# Patient Record
Sex: FEMALE | Race: WHITE | NOT HISPANIC OR LATINO | Employment: UNEMPLOYED | URBAN - METROPOLITAN AREA
[De-identification: names, ages, dates, MRNs, and addresses within clinical notes are randomized per-mention and may not be internally consistent; named-entity substitution may affect disease eponyms.]

---

## 2017-03-09 ENCOUNTER — APPOINTMENT (OUTPATIENT)
Dept: GENERAL RADIOLOGY | Facility: HOSPITAL | Age: 62
End: 2017-03-09

## 2017-03-09 ENCOUNTER — HOSPITAL ENCOUNTER (INPATIENT)
Facility: HOSPITAL | Age: 62
LOS: 12 days | Discharge: NURSING, REHAB OR LONG-TERM CARE | End: 2017-03-21
Attending: EMERGENCY MEDICINE | Admitting: INTERNAL MEDICINE

## 2017-03-09 ENCOUNTER — APPOINTMENT (OUTPATIENT)
Dept: CT IMAGING | Facility: HOSPITAL | Age: 62
End: 2017-03-09

## 2017-03-09 DIAGNOSIS — M72.6 NECROTIZING FASCIITIS (HCC): ICD-10-CM
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
## 2017-03-09 DIAGNOSIS — E87.20 METABOLIC ACIDOSIS: ICD-10-CM

## 2017-03-09 DIAGNOSIS — A41.9 SEPSIS, DUE TO UNSPECIFIED ORGANISM: ICD-10-CM

## 2017-03-09 DIAGNOSIS — T79.6XXA TRAUMATIC RHABDOMYOLYSIS, INITIAL ENCOUNTER (HCC): ICD-10-CM

## 2017-03-09 DIAGNOSIS — N17.9 ACUTE RENAL FAILURE, UNSPECIFIED ACUTE RENAL FAILURE TYPE (HCC): Primary | ICD-10-CM

## 2017-03-09 DIAGNOSIS — T68.XXXA HYPOTHERMIA, INITIAL ENCOUNTER: ICD-10-CM

## 2017-03-09 LAB
ALBUMIN SERPL-MCNC: 3 G/DL (ref 3.5–5.2)
ALBUMIN SERPL-MCNC: 3.5 G/DL (ref 3.5–5.2)
ALBUMIN/GLOB SERPL: 0.8 G/DL
ALP SERPL-CCNC: 202 U/L (ref 39–117)
ALT SERPL W P-5'-P-CCNC: 145 U/L (ref 1–33)
ANION GAP SERPL CALCULATED.3IONS-SCNC: 22.6 MMOL/L
ANION GAP SERPL CALCULATED.3IONS-SCNC: 24.1 MMOL/L
APTT PPP: 26.5 SECONDS (ref 22.7–35.4)
ARTERIAL PATENCY WRIST A: ABNORMAL
AST SERPL-CCNC: 429 U/L (ref 1–32)
ATMOSPHERIC PRESS: 750.9 MMHG
BACTERIA UR QL AUTO: ABNORMAL /HPF
BASE EXCESS BLDA CALC-SCNC: -8.4 MMOL/L (ref 0–2)
BASOPHILS # BLD AUTO: 0.01 10*3/MM3 (ref 0–0.2)
BASOPHILS NFR BLD AUTO: 0.1 % (ref 0–1.5)
BDY SITE: ABNORMAL
BILIRUB SERPL-MCNC: 0.4 MG/DL (ref 0.1–1.2)
BILIRUB UR QL STRIP: NEGATIVE
BUN BLD-MCNC: 32 MG/DL (ref 8–23)
BUN BLD-MCNC: 33 MG/DL (ref 8–23)
BUN/CREAT SERPL: 14.3 (ref 7–25)
BUN/CREAT SERPL: 14.9 (ref 7–25)
CALCIUM SPEC-SCNC: 7.9 MG/DL (ref 8.6–10.5)
CALCIUM SPEC-SCNC: 8.5 MG/DL (ref 8.6–10.5)
CHLORIDE SERPL-SCNC: 102 MMOL/L (ref 98–107)
CHLORIDE SERPL-SCNC: 104 MMOL/L (ref 98–107)
CK SERPL-CCNC: ABNORMAL U/L (ref 20–180)
CLARITY UR: ABNORMAL
CO2 SERPL-SCNC: 15.9 MMOL/L (ref 22–29)
CO2 SERPL-SCNC: 18.4 MMOL/L (ref 22–29)
COLOR UR: ABNORMAL
CREAT BLD-MCNC: 2.22 MG/DL (ref 0.57–1)
CREAT BLD-MCNC: 2.24 MG/DL (ref 0.57–1)
D-LACTATE SERPL-SCNC: 2 MMOL/L (ref 0.5–2)
D-LACTATE SERPL-SCNC: 3.8 MMOL/L (ref 0.5–2)
DEPRECATED RDW RBC AUTO: 51.5 FL (ref 37–54)
EOSINOPHIL # BLD AUTO: 0.01 10*3/MM3 (ref 0–0.7)
EOSINOPHIL NFR BLD AUTO: 0.1 % (ref 0.3–6.2)
ERYTHROCYTE [DISTWIDTH] IN BLOOD BY AUTOMATED COUNT: 17.2 % (ref 11.7–13)
GAS FLOW AIRWAY: 2 LPM
GFR SERPL CREATININE-BSD FRML MDRD: 22 ML/MIN/1.73
GFR SERPL CREATININE-BSD FRML MDRD: 22 ML/MIN/1.73
GLOBULIN UR ELPH-MCNC: 4.5 GM/DL
GLUCOSE BLD-MCNC: 121 MG/DL (ref 65–99)
GLUCOSE BLD-MCNC: 150 MG/DL (ref 65–99)
GLUCOSE BLDC GLUCOMTR-MCNC: 116 MG/DL (ref 70–130)
GLUCOSE BLDC GLUCOMTR-MCNC: 163 MG/DL (ref 70–130)
GLUCOSE UR STRIP-MCNC: NEGATIVE MG/DL
HCO3 BLDA-SCNC: 19.4 MMOL/L (ref 22–28)
HCT VFR BLD AUTO: 52.4 % (ref 35.6–45.5)
HGB BLD-MCNC: 17 G/DL (ref 11.9–15.5)
HGB UR QL STRIP.AUTO: ABNORMAL
HYALINE CASTS UR QL AUTO: ABNORMAL /LPF
IMM GRANULOCYTES # BLD: 0.06 10*3/MM3 (ref 0–0.03)
IMM GRANULOCYTES NFR BLD: 0.3 % (ref 0–0.5)
INR PPP: 1.18 (ref 0.9–1.1)
KETONES UR QL STRIP: NEGATIVE
LEUKOCYTE ESTERASE UR QL STRIP.AUTO: NEGATIVE
LYMPHOCYTES # BLD AUTO: 1.13 10*3/MM3 (ref 0.9–4.8)
LYMPHOCYTES NFR BLD AUTO: 6.4 % (ref 19.6–45.3)
MCH RBC QN AUTO: 27.2 PG (ref 26.9–32)
MCHC RBC AUTO-ENTMCNC: 32.4 G/DL (ref 32.4–36.3)
MCV RBC AUTO: 83.7 FL (ref 80.5–98.2)
MODALITY: ABNORMAL
MONOCYTES # BLD AUTO: 1 10*3/MM3 (ref 0.2–1.2)
MONOCYTES NFR BLD AUTO: 5.7 % (ref 5–12)
MYOGLOBIN SERPL-MCNC: >3000 NG/ML (ref 25–58)
NEUTROPHILS # BLD AUTO: 15.39 10*3/MM3 (ref 1.9–8.1)
NEUTROPHILS NFR BLD AUTO: 87.4 % (ref 42.7–76)
NITRITE UR QL STRIP: NEGATIVE
NRBC BLD MANUAL-RTO: 0 /100 WBC (ref 0–0)
PCO2 BLDA: 47.1 MM HG (ref 35–45)
PH BLDA: 7.22 PH UNITS (ref 7.35–7.45)
PH UR STRIP.AUTO: <=5 [PH] (ref 5–8)
PHOSPHATE SERPL-MCNC: 6.4 MG/DL (ref 2.5–4.5)
PLAT MORPH BLD: NORMAL
PLATELET # BLD AUTO: 323 10*3/MM3 (ref 140–500)
PMV BLD AUTO: 10.4 FL (ref 6–12)
PO2 BLDA: 84.4 MM HG (ref 80–100)
POTASSIUM BLD-SCNC: 4 MMOL/L (ref 3.5–5.2)
POTASSIUM BLD-SCNC: 4.4 MMOL/L (ref 3.5–5.2)
PROCALCITONIN SERPL-MCNC: 3.44 NG/ML (ref 0.1–0.25)
PROT SERPL-MCNC: 8 G/DL (ref 6–8.5)
PROT UR QL STRIP: ABNORMAL
PROTHROMBIN TIME: 14.5 SECONDS (ref 11.7–14.2)
RBC # BLD AUTO: 6.26 10*6/MM3 (ref 3.9–5.2)
RBC # UR: ABNORMAL /HPF
RBC MORPH BLD: NORMAL
REF LAB TEST METHOD: ABNORMAL
SAO2 % BLDCOA: 94 % (ref 92–99)
SODIUM BLD-SCNC: 142 MMOL/L (ref 136–145)
SODIUM BLD-SCNC: 145 MMOL/L (ref 136–145)
SP GR UR STRIP: 1.02 (ref 1–1.03)
SQUAMOUS #/AREA URNS HPF: ABNORMAL /HPF
TOTAL RATE: 18 BREATHS/MINUTE
TROPONIN T SERPL-MCNC: 0.01 NG/ML (ref 0–0.03)
UROBILINOGEN UR QL STRIP: ABNORMAL
WBC MORPH BLD: NORMAL
WBC NRBC COR # BLD: 17.6 10*3/MM3 (ref 4.5–10.7)
WBC UR QL AUTO: ABNORMAL /HPF

## 2017-03-09 PROCEDURE — 80053 COMPREHEN METABOLIC PANEL: CPT | Performed by: EMERGENCY MEDICINE

## 2017-03-09 PROCEDURE — 99285 EMERGENCY DEPT VISIT HI MDM: CPT

## 2017-03-09 PROCEDURE — 25010000002 PIPERACILLIN SOD-TAZOBACTAM PER 1 G: Performed by: EMERGENCY MEDICINE

## 2017-03-09 PROCEDURE — 87150 DNA/RNA AMPLIFIED PROBE: CPT | Performed by: EMERGENCY MEDICINE

## 2017-03-09 PROCEDURE — 25010000002 ENOXAPARIN PER 10 MG: Performed by: INTERNAL MEDICINE

## 2017-03-09 PROCEDURE — 74176 CT ABD & PELVIS W/O CONTRAST: CPT

## 2017-03-09 PROCEDURE — 25010000002 MORPHINE PER 10 MG

## 2017-03-09 PROCEDURE — 93005 ELECTROCARDIOGRAM TRACING: CPT | Performed by: EMERGENCY MEDICINE

## 2017-03-09 PROCEDURE — 85007 BL SMEAR W/DIFF WBC COUNT: CPT | Performed by: EMERGENCY MEDICINE

## 2017-03-09 PROCEDURE — 80069 RENAL FUNCTION PANEL: CPT | Performed by: INTERNAL MEDICINE

## 2017-03-09 PROCEDURE — 82550 ASSAY OF CK (CPK): CPT | Performed by: EMERGENCY MEDICINE

## 2017-03-09 PROCEDURE — 70450 CT HEAD/BRAIN W/O DYE: CPT

## 2017-03-09 PROCEDURE — 83605 ASSAY OF LACTIC ACID: CPT | Performed by: EMERGENCY MEDICINE

## 2017-03-09 PROCEDURE — 85730 THROMBOPLASTIN TIME PARTIAL: CPT | Performed by: NURSE PRACTITIONER

## 2017-03-09 PROCEDURE — 25010000002 FENTANYL CITRATE (PF) 100 MCG/2ML SOLUTION: Performed by: INTERNAL MEDICINE

## 2017-03-09 PROCEDURE — 85025 COMPLETE CBC W/AUTO DIFF WBC: CPT | Performed by: EMERGENCY MEDICINE

## 2017-03-09 PROCEDURE — 81001 URINALYSIS AUTO W/SCOPE: CPT | Performed by: NURSE PRACTITIONER

## 2017-03-09 PROCEDURE — 84484 ASSAY OF TROPONIN QUANT: CPT | Performed by: EMERGENCY MEDICINE

## 2017-03-09 PROCEDURE — 36600 WITHDRAWAL OF ARTERIAL BLOOD: CPT

## 2017-03-09 PROCEDURE — 87186 SC STD MICRODIL/AGAR DIL: CPT | Performed by: EMERGENCY MEDICINE

## 2017-03-09 PROCEDURE — 71010 HC CHEST PA OR AP: CPT

## 2017-03-09 PROCEDURE — 84145 PROCALCITONIN (PCT): CPT | Performed by: NURSE PRACTITIONER

## 2017-03-09 PROCEDURE — 25010000002 ONDANSETRON PER 1 MG

## 2017-03-09 PROCEDURE — 83874 ASSAY OF MYOGLOBIN: CPT | Performed by: NURSE PRACTITIONER

## 2017-03-09 PROCEDURE — 99221 1ST HOSP IP/OBS SF/LOW 40: CPT | Performed by: SURGERY

## 2017-03-09 PROCEDURE — 73090 X-RAY EXAM OF FOREARM: CPT

## 2017-03-09 PROCEDURE — 82803 BLOOD GASES ANY COMBINATION: CPT

## 2017-03-09 PROCEDURE — 73060 X-RAY EXAM OF HUMERUS: CPT

## 2017-03-09 PROCEDURE — 85610 PROTHROMBIN TIME: CPT | Performed by: NURSE PRACTITIONER

## 2017-03-09 PROCEDURE — 25010000002 VANCOMYCIN: Performed by: EMERGENCY MEDICINE

## 2017-03-09 PROCEDURE — 82962 GLUCOSE BLOOD TEST: CPT

## 2017-03-09 PROCEDURE — 87040 BLOOD CULTURE FOR BACTERIA: CPT | Performed by: EMERGENCY MEDICINE

## 2017-03-09 PROCEDURE — 71250 CT THORAX DX C-: CPT

## 2017-03-09 PROCEDURE — 93010 ELECTROCARDIOGRAM REPORT: CPT | Performed by: INTERNAL MEDICINE

## 2017-03-09 RX ORDER — BISACODYL 10 MG
10 SUPPOSITORY, RECTAL RECTAL DAILY PRN
Status: DISCONTINUED | OUTPATIENT
Start: 2017-03-09 | End: 2017-03-21 | Stop reason: HOSPADM

## 2017-03-09 RX ORDER — SODIUM CHLORIDE 9 MG/ML
150 INJECTION, SOLUTION INTRAVENOUS CONTINUOUS
Status: DISCONTINUED | OUTPATIENT
Start: 2017-03-09 | End: 2017-03-09

## 2017-03-09 RX ORDER — FENTANYL CITRATE 50 UG/ML
25 INJECTION, SOLUTION INTRAMUSCULAR; INTRAVENOUS
Status: DISPENSED | OUTPATIENT
Start: 2017-03-09 | End: 2017-03-19

## 2017-03-09 RX ORDER — SODIUM CHLORIDE 0.9 % (FLUSH) 0.9 %
10 SYRINGE (ML) INJECTION AS NEEDED
Status: DISCONTINUED | OUTPATIENT
Start: 2017-03-09 | End: 2017-03-21 | Stop reason: HOSPADM

## 2017-03-09 RX ORDER — ACETAMINOPHEN 650 MG/1
650 SUPPOSITORY RECTAL EVERY 4 HOURS PRN
Status: DISCONTINUED | OUTPATIENT
Start: 2017-03-09 | End: 2017-03-21 | Stop reason: HOSPADM

## 2017-03-09 RX ORDER — ONDANSETRON 2 MG/ML
INJECTION INTRAMUSCULAR; INTRAVENOUS
Status: COMPLETED
Start: 2017-03-09 | End: 2017-03-09

## 2017-03-09 RX ORDER — FENTANYL CITRATE 50 UG/ML
50 INJECTION, SOLUTION INTRAMUSCULAR; INTRAVENOUS
Status: DISPENSED | OUTPATIENT
Start: 2017-03-09 | End: 2017-03-19

## 2017-03-09 RX ORDER — SODIUM CHLORIDE 0.9 % (FLUSH) 0.9 %
1-10 SYRINGE (ML) INJECTION AS NEEDED
Status: DISCONTINUED | OUTPATIENT
Start: 2017-03-09 | End: 2017-03-21 | Stop reason: HOSPADM

## 2017-03-09 RX ORDER — ONDANSETRON 4 MG/1
4 TABLET, FILM COATED ORAL EVERY 6 HOURS PRN
Status: DISCONTINUED | OUTPATIENT
Start: 2017-03-09 | End: 2017-03-21 | Stop reason: HOSPADM

## 2017-03-09 RX ORDER — ONDANSETRON 2 MG/ML
4 INJECTION INTRAMUSCULAR; INTRAVENOUS EVERY 6 HOURS PRN
Status: DISCONTINUED | OUTPATIENT
Start: 2017-03-09 | End: 2017-03-21 | Stop reason: HOSPADM

## 2017-03-09 RX ORDER — IPRATROPIUM BROMIDE AND ALBUTEROL SULFATE 2.5; .5 MG/3ML; MG/3ML
3 SOLUTION RESPIRATORY (INHALATION) EVERY 6 HOURS PRN
Status: DISCONTINUED | OUTPATIENT
Start: 2017-03-09 | End: 2017-03-21 | Stop reason: HOSPADM

## 2017-03-09 RX ORDER — BALSALAZIDE DISODIUM 750 MG/1
2250 CAPSULE ORAL 3 TIMES DAILY
COMMUNITY
End: 2017-05-08

## 2017-03-09 RX ORDER — ESOMEPRAZOLE MAGNESIUM 40 MG/1
40 CAPSULE, DELAYED RELEASE ORAL
COMMUNITY
End: 2017-03-21 | Stop reason: HOSPADM

## 2017-03-09 RX ORDER — GABAPENTIN 100 MG/1
100 CAPSULE ORAL 3 TIMES DAILY
COMMUNITY
End: 2017-05-08

## 2017-03-09 RX ORDER — ONDANSETRON 4 MG/1
4 TABLET, ORALLY DISINTEGRATING ORAL EVERY 6 HOURS PRN
Status: DISCONTINUED | OUTPATIENT
Start: 2017-03-09 | End: 2017-03-21 | Stop reason: HOSPADM

## 2017-03-09 RX ORDER — ONDANSETRON 2 MG/ML
4 INJECTION INTRAMUSCULAR; INTRAVENOUS ONCE
Status: COMPLETED | OUTPATIENT
Start: 2017-03-09 | End: 2017-03-09

## 2017-03-09 RX ORDER — VANCOMYCIN HYDROCHLORIDE 1 G/200ML
1000 INJECTION, SOLUTION INTRAVENOUS EVERY 24 HOURS
Status: DISCONTINUED | OUTPATIENT
Start: 2017-03-10 | End: 2017-03-09 | Stop reason: SDUPTHER

## 2017-03-09 RX ORDER — MAGNESIUM HYDROXIDE/ALUMINUM HYDROXICE/SIMETHICONE 120; 1200; 1200 MG/30ML; MG/30ML; MG/30ML
30 SUSPENSION ORAL EVERY 6 HOURS PRN
Status: DISCONTINUED | OUTPATIENT
Start: 2017-03-09 | End: 2017-03-21

## 2017-03-09 RX ORDER — ACETAMINOPHEN 325 MG/1
650 TABLET ORAL EVERY 4 HOURS PRN
Status: DISCONTINUED | OUTPATIENT
Start: 2017-03-09 | End: 2017-03-21 | Stop reason: HOSPADM

## 2017-03-09 RX ORDER — HYDROCHLOROTHIAZIDE 25 MG/1
25 TABLET ORAL DAILY
COMMUNITY
End: 2017-03-21 | Stop reason: HOSPADM

## 2017-03-09 RX ORDER — CLINDAMYCIN PHOSPHATE 600 MG/50ML
600 INJECTION INTRAVENOUS ONCE
Status: COMPLETED | OUTPATIENT
Start: 2017-03-09 | End: 2017-03-09

## 2017-03-09 RX ADMIN — ONDANSETRON 4 MG: 2 INJECTION INTRAMUSCULAR; INTRAVENOUS at 15:04

## 2017-03-09 RX ADMIN — TAZOBACTAM SODIUM AND PIPERACILLIN SODIUM 4.5 G: 500; 4 INJECTION, SOLUTION INTRAVENOUS at 16:56

## 2017-03-09 RX ADMIN — SODIUM CHLORIDE 1000 ML: 9 INJECTION, SOLUTION INTRAVENOUS at 19:54

## 2017-03-09 RX ADMIN — ENOXAPARIN SODIUM 30 MG: 30 INJECTION SUBCUTANEOUS at 21:42

## 2017-03-09 RX ADMIN — FENTANYL CITRATE 25 MCG: 50 INJECTION INTRAMUSCULAR; INTRAVENOUS at 21:42

## 2017-03-09 RX ADMIN — VANCOMYCIN HYDROCHLORIDE 2250 MG: 1 INJECTION, POWDER, LYOPHILIZED, FOR SOLUTION INTRAVENOUS at 17:59

## 2017-03-09 RX ADMIN — SODIUM CHLORIDE 1000 ML: 9 INJECTION, SOLUTION INTRAVENOUS at 15:01

## 2017-03-09 RX ADMIN — MORPHINE SULFATE 4 MG: 4 INJECTION, SOLUTION INTRAMUSCULAR; INTRAVENOUS at 15:04

## 2017-03-09 RX ADMIN — Medication 4 MG: at 15:04

## 2017-03-09 RX ADMIN — SODIUM BICARBONATE 200 ML/HR: 84 INJECTION, SOLUTION INTRAVENOUS at 21:10

## 2017-03-09 RX ADMIN — CLINDAMYCIN PHOSPHATE 600 MG: 12 INJECTION, SOLUTION INTRAMUSCULAR; INTRAVENOUS at 18:10
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
## 2017-03-09 NOTE — PROGRESS NOTES
"Pharmacy to dose Vancomycin: Initial Consult  Patient: Luca Amezcua  : 1955  MRN: [id number]  Admit date: 3/9/2017  2:33 PM    Day 1 of pharmacy to dose vancomycin   Consult for Dr. Jenkins  Treating: Skin and soft tissue infection  Goal trough: 15 - 20 mg/L    Current Vancomycin dose: Vancomycin 2250mg IV x 1 in ED       IV Anti-Infectives     Ordered     Dose/Rate Route Frequency Start Stop    17 180  piperacillin-tazobactam (ZOSYN) 3.375 g in dextrose 50 mL IVPB (premix)     Ordering Provider:  Aguilar Jenkins MD    3.375 g  over 4 Hours Intravenous Every 8 Hours 03/10/17 0100      17 181  Vancomycin Pharmacy Intermittent Dosing     Ordering Provider:  Aguilar Jenkins MD     Does not apply Daily 17 18117 180  Pharmacy to dose vancomycin     Ordering Provider:  Aguilar Jenkins MD     Does not apply Continuous PRN 17 18017 173  vancomycin 2250 mg/500 mL 0.9% NS IVPB ([facility])     Ordering Provider:  Gamal Olguin MD    20 mg/kg × 113 kg Intravenous Once 17 1800 17 1759    17 1758  clindamycin (CLEOCIN) 600 mg in dextrose 5% 50 mL IVPB (premix)     Ordering Provider:  Gamal Olguin MD    600 mg Intravenous Once 17 1800 17 1810    17 1612  piperacillin-tazobactam (ZOSYN) 4.5 g in dextrose 100 mL IVPB (premix)     Ordering Provider:  Gamal Olguin MD    4.5 g Intravenous Once 17 1614 17 1759           Relevant clinical data and objective history reviewed:  61 y.o. female 64\" (162.6 cm) 250 lb (113 kg)    Lab Results   Component Value Date    CREATININE 2.24 (H) 2017     Estimated Creatinine Clearance: 32.1 mL/min (by C-G formula based on Cr of 2.24).    Lab Results   Component Value Date    WBC 17.60 (H) 2017     Temp Readings from Last 1 Encounters:   17 96.8 °F (36 °C) (Rectal)       Baseline cultures/labs/radiology:  3/9 - Blood cultures x 2 - in " process    Assessment/Plan:   -Based on patient's age, weight, renal function, goal trough and site of infection, recommend initial regimen of vancomycin intermittent dosing due to patient's acute kidney injury  -Check vancomycin random level with AM labs on 3/10  -Follow renal function closely with BMP daily for at least first three days of vancomycin  -Follow up cultures to de-escalate antibiotic therapy as clinically appropriate  -Pharmacy will continue to follow daily and adjust as needed.    Thank you for the consult.    Please call if questions,    Debbi Singh, PharmD, BCPS  Clinical Pharmacy Specialist, Emergency Medicine  [place] Phone: [phone]

## 2017-03-09 NOTE — ED PROVIDER NOTES
EMERGENCY DEPARTMENT ENCOUNTER    CHIEF COMPLAINT  Chief Complaint: fall  History given by: patient, EMS  History limited by: acuity of condition  Room Number: 18/18  PMD: No Known Provider      HPI:  History predominantly obtained from EMS. Pt is a 61 y.o. female who presents s/p fall. Pt states that she lives alone and 2 days ago, she slipped in her restroom and fell in her stand up shower stall while the water was still running. She was unable to stand after the fall and remained in the shower stall until today. Per EMS, this afternoon, they received a call from family to check on the pt. When EMS arrived on scene, they noticed that pt was leaned over the shower stall and that the water from the shower head was still running. Pt states that she has subsequent diffuse chest wall pain, diffuse lower abd pain, pelvic pain, and LUE pain. She denies headache, neck pain, back pain, and trouble breathing. Past Medical History of HTN and GERD (PMH based on medication list).     Duration: fall occurred 2 days ago  Timing: constant  Location: Generalized  Radiation: N/A  Quality: N/A  Intensity/Severity: moderate to severe  Progression: N/A  Associated Symptoms: N/A  Aggravating Factors: N/A  Alleviating Factors: N/A  Previous Episodes: none mentioned  Treatment before arrival: EMS transport, IV    PAST MEDICAL HISTORY  Active Ambulatory Problems     Diagnosis Date Noted   • No Active Ambulatory Problems     Resolved Ambulatory Problems     Diagnosis Date Noted   • No Resolved Ambulatory Problems     No Additional Past Medical History       PAST SURGICAL HISTORY  No past surgical history on file.    FAMILY HISTORY  No family history on file.    SOCIAL HISTORY  Social History     Social History   • Marital status: Single     Spouse name: N/A   • Number of children: N/A   • Years of education: N/A     Occupational History   • Not on file.     Social History Main Topics   • Smoking status: Not on file   • Smokeless tobacco:  Not on file   • Alcohol use Not on file   • Drug use: Not on file   • Sexual activity: Not on file     Other Topics Concern   • Not on file     Social History Narrative         ALLERGIES  Review of patient's allergies indicates no known allergies.    REVIEW OF SYSTEMS  Review of Systems   Unable to perform ROS: Acuity of condition   Respiratory: Negative for shortness of breath.    Cardiovascular: Positive for chest pain (diffusely to chest wall).   Gastrointestinal: Positive for abdominal pain (diffuse to lower abd).   Musculoskeletal: Negative for back pain and neck pain.        Pelvic pain, LUE pain   Neurological: Negative for headaches.       PHYSICAL EXAM  ED Triage Vitals   Temp Heart Rate Resp BP SpO2   03/09/17 1426 03/09/17 1426 03/09/17 1426 03/09/17 1426 03/09/17 1426   95.3 °F (35.2 °C) 93 25 127/78 100 % WNL       Physical Exam   Constitutional: She is oriented to person, place, and time.   Exam limited by acuity of condition   HENT:   Head: Normocephalic.   Mouth/Throat: Mucous membranes are dry.   No obvious signs of trauma to head   Eyes: No scleral icterus.   Neck: Normal range of motion.   No c-spine tenderness   Cardiovascular: Normal rate, regular rhythm and normal heart sounds.    Pulses:       Radial pulses are 2+ on the right side, and 2+ on the left side.        Dorsalis pedis pulses are 2+ on the right side, and 2+ on the left side.        Posterior tibial pulses are 2+ on the right side, and 2+ on the left side.   Pulmonary/Chest: Effort normal and breath sounds normal. No respiratory distress.   Abdominal: Soft.   Neurological: She is alert and oriented to person, place, and time. She has normal sensation.   Follows commands, answers questions appropriately   Skin:   Erythema to left upper arm and left forearm, bruising to left breast between 1 o'clock and 3 o'clock, ulcerations that extend from the left pelvis across to the right pelvis, erythema to right breast, skin feels cold to the  touch   Psychiatric: Mood and affect normal.   Nursing note and vitals reviewed.      LAB RESULTS  Recent Results (from the past 24 hour(s))   Lactic Acid, Plasma    Collection Time: 03/09/17  3:24 PM   Result Value Ref Range    Lactate 3.8 (C) 0.5 - 2.0 mmol/L   CBC Auto Differential    Collection Time: 03/09/17  3:24 PM   Result Value Ref Range    WBC 17.60 (H) 4.50 - 10.70 10*3/mm3    RBC 6.26 (H) 3.90 - 5.20 10*6/mm3    Hemoglobin 17.0 (H) 11.9 - 15.5 g/dL    Hematocrit 52.4 (H) 35.6 - 45.5 %    MCV 83.7 80.5 - 98.2 fL    MCH 27.2 26.9 - 32.0 pg    MCHC 32.4 32.4 - 36.3 g/dL    RDW 17.2 (H) 11.7 - 13.0 %    RDW-SD 51.5 37.0 - 54.0 fl    MPV 10.4 6.0 - 12.0 fL    Platelets 323 140 - 500 10*3/mm3    Neutrophil % 87.4 (H) 42.7 - 76.0 %    Lymphocyte % 6.4 (L) 19.6 - 45.3 %    Monocyte % 5.7 5.0 - 12.0 %    Eosinophil % 0.1 (L) 0.3 - 6.2 %    Basophil % 0.1 0.0 - 1.5 %    Immature Grans % 0.3 0.0 - 0.5 %    Neutrophils, Absolute 15.39 (H) 1.90 - 8.10 10*3/mm3    Lymphocytes, Absolute 1.13 0.90 - 4.80 10*3/mm3    Monocytes, Absolute 1.00 0.20 - 1.20 10*3/mm3    Eosinophils, Absolute 0.01 0.00 - 0.70 10*3/mm3    Basophils, Absolute 0.01 0.00 - 0.20 10*3/mm3    Immature Grans, Absolute 0.06 (H) 0.00 - 0.03 10*3/mm3    nRBC 0.0 0.0 - 0.0 /100 WBC   Protime-INR    Collection Time: 03/09/17  3:24 PM   Result Value Ref Range    Protime 14.5 (H) 11.7 - 14.2 Seconds    INR 1.18 (H) 0.90 - 1.10   aPTT    Collection Time: 03/09/17  3:24 PM   Result Value Ref Range    PTT 26.5 22.7 - 35.4 seconds   Scan Slide    Collection Time: 03/09/17  3:24 PM   Result Value Ref Range    RBC Morphology Normal Normal    WBC Morphology Normal Normal    Platelet Morphology Normal Normal   Blood Gas, Arterial    Collection Time: 03/09/17  4:02 PM   Result Value Ref Range    Site Arterial: left brachial     Carlito's Test N/A     pH, Arterial 7.224 (C) 7.350 - 7.450 pH units    pCO2, Arterial 47.1 (H) 35.0 - 45.0 mm Hg    pO2, Arterial 84.4 80.0 -  100.0 mm Hg    HCO3, Arterial 19.4 (L) 22.0 - 28.0 mmol/L    Base Excess, Arterial -8.4 (L) 0.0 - 2.0 mmol/L    O2 Saturation Calculated 94.0 92.0 - 99.0 %    Barometric Pressure for Blood Gas 750.9 mmHg    Modality Cannula     Flow Rate 2 lpm    Rate 18 Breaths/minute   Urinalysis With / Culture If Indicated    Collection Time: 03/09/17  4:21 PM   Result Value Ref Range    Color, UA Dark Yellow (A) Yellow, Straw    Appearance, UA Cloudy (A) Clear    pH, UA <=5.0 5.0 - 8.0    Specific Gravity, UA 1.021 1.005 - 1.030    Glucose, UA Negative Negative    Ketones, UA Negative Negative    Bilirubin, UA Negative Negative    Blood, UA Small (1+) (A) Negative    Protein, UA 30 mg/dL (1+) (A) Negative    Leuk Esterase, UA Negative Negative    Nitrite, UA Negative Negative    Urobilinogen, UA 0.2 E.U./dL 0.2 - 1.0 E.U./dL   Urinalysis, Microscopic Only    Collection Time: 03/09/17  4:21 PM   Result Value Ref Range    RBC, UA 0-2 None Seen, 0-2 /HPF    WBC, UA 0-2 None Seen, 0-2 /HPF    Bacteria, UA None Seen None Seen /HPF    Squamous Epithelial Cells, UA 3-6 (A) None Seen, 0-2 /HPF    Hyaline Casts, UA 7-12 None Seen /LPF    Methodology Automated Microscopy    Comprehensive Metabolic Panel    Collection Time: 03/09/17  4:44 PM   Result Value Ref Range    Glucose 121 (H) 65 - 99 mg/dL    BUN 32 (H) 8 - 23 mg/dL    Creatinine 2.24 (H) 0.57 - 1.00 mg/dL    Sodium 142 136 - 145 mmol/L    Potassium 4.0 3.5 - 5.2 mmol/L    Chloride 102 98 - 107 mmol/L    CO2 15.9 (L) 22.0 - 29.0 mmol/L    Calcium 8.5 (L) 8.6 - 10.5 mg/dL    Total Protein 8.0 6.0 - 8.5 g/dL    Albumin 3.50 3.50 - 5.20 g/dL    ALT (SGPT) 145 (H) 1 - 33 U/L    AST (SGOT) 429 (H) 1 - 32 U/L    Alkaline Phosphatase 202 (H) 39 - 117 U/L    Total Bilirubin 0.4 0.1 - 1.2 mg/dL    eGFR Non African Amer 22 (L) >60 mL/min/1.73    Globulin 4.5 gm/dL    A/G Ratio 0.8 g/dL    BUN/Creatinine Ratio 14.3 7.0 - 25.0    Anion Gap 24.1 mmol/L   CK    Collection Time: 03/09/17  4:44  PM   Result Value Ref Range    Creatine Kinase >74396 (H) 20 - 180 U/L   Troponin    Collection Time: 03/09/17  4:44 PM   Result Value Ref Range    Troponin T 0.011 0.000 - 0.030 ng/mL   Procalcitonin    Collection Time: 03/09/17  4:44 PM   Result Value Ref Range    Procalcitonin 3.44 (C) 0.10 - 0.25 ng/mL       I ordered the above labs and reviewed the results    RADIOLOGY         CT Head Without Contrast (Final result) Result time: 03/09/17 17:47:00     Final result by Rodrigue Floyd MD (03/09/17 17:47:00)     Impression:     Evidence of minimal small vessel chronic ischemic change as  described. Otherwise unremarkable CT scan of the head.     This report was finalized on 3/9/2017 5:47 PM by Dr. Rodrigue Floyd MD.        Narrative:     CT SCAN OF THE HEAD WITHOUT CONTRAST     CLINICAL HISTORY: Patient fell 2 days ago with head trauma.     TECHNIQUE: Multiple axial 3mm thick images were acquired from the base  of the skull to the vertex without contrast.     COMPARISON: None     FINDINGS: The brain and ventricular system appear structurally normal.  There is no evidence of acute infarct or hemorrhage. There is minimal  ill-defined attenuation in the white matter of her early the left  frontal lobe is likely sequela of small vessel chronic ischemic change.  There are no masses. Bone window images show no evidence of skull  fracture.                  CT Chest Without Contrast (Preliminary result) Result time: 03/09/17 17:48:44     Preliminary result by Interface, Rad Results [place] In (03/09/17 17:48:44)     Impression:     1. Extensive soft tissue gas is seen in the left flank that extends into  the left lower quadrant and left gluteal soft tissues. There is no  intraperitoneal extension but there is some gas within the left  abdominis rectus musculature. No definite penetrating injury is  appreciated. This could represent fasciitis. Clinical followup is  suggested.  2. There is no evidence for a fracture or  traumatic abnormality of the  chest, abdomen or pelvis.  3. 5.9 cm cyst at the superior pole of the right kidney.  4. Mild atelectasis within the right upper lobe and/or right lower lobe.  Pneumonia cannot be completely excluded.        Narrative:     EXAMINATIONS: CT OF THE CHEST WITHOUT CONTRAST AND CT OF THE ABDOMEN AND  PELVIS WITHOUT CONTRAST     HISTORY: 61-year-old female with a history of trauma, found in the  bathtub for 2 days and bruising of both breasts.     TECHNIQUE: Contiguous axial images were obtained through the chest,  abdomen and pelvis without IV or oral contrast.     COMPARISON: CT of the chest, 05/04/2007 and CT of the abdomen and  pelvis, 04/03/2007.     FINDINGS OF THE CHEST: There is focal atelectasis within the right upper  lobe and to a lesser degree within the right lower lobe. The left lung  is also under aerated. No areas of consolidation are otherwise  appreciated. There is no evidence for mediastinal adenopathy. No  axillary adenopathy is appreciated. The osseous structures of the thorax  have a normal appearance. Subcutaneous gas is noted in the left chest  wall.     FINDINGS OF THE ABDOMEN AND PELVIS: A 5.9 x 4.2 cm cyst is seen at the  superior pole of the right kidney. The adrenal glands, pancreas, spleen  and liver have a normal noncontrasted appearance. The left kidney has a  normal noncontrasted appearance. No abnormality of the bowel is  appreciated. Abundant gas is seen within the left hemiabdominal soft  tissues. The gas extends along the left lateral hemithorax/flank region  into the left lower quadrant soft tissues and left gluteal region.                  CT Abdomen Pelvis Without Contrast (Preliminary result) Result time: 03/09/17 17:48:44     Preliminary result by Interface, Rad Results [place] In (03/09/17 17:48:44)     Impression:     1. Extensive soft tissue gas is seen in the left flank that extends into  the left lower quadrant and left gluteal soft tissues.  There is no  intraperitoneal extension but there is some gas within the left  abdominis rectus musculature. No definite penetrating injury is  appreciated. This could represent fasciitis. Clinical followup is  suggested.  2. There is no evidence for a fracture or traumatic abnormality of the  chest, abdomen or pelvis.  3. 5.9 cm cyst at the superior pole of the right kidney.  4. Mild atelectasis within the right upper lobe and/or right lower lobe.  Pneumonia cannot be completely excluded.        Narrative:     EXAMINATIONS: CT OF THE CHEST WITHOUT CONTRAST AND CT OF THE ABDOMEN AND  PELVIS WITHOUT CONTRAST     HISTORY: 61-year-old female with a history of trauma, found in the  bathtub for 2 days and bruising of both breasts.     TECHNIQUE: Contiguous axial images were obtained through the chest,  abdomen and pelvis without IV or oral contrast.     COMPARISON: CT of the chest, 05/04/2007 and CT of the abdomen and  pelvis, 04/03/2007.     FINDINGS OF THE CHEST: There is focal atelectasis within the right upper  lobe and to a lesser degree within the right lower lobe. The left lung  is also under aerated. No areas of consolidation are otherwise  appreciated. There is no evidence for mediastinal adenopathy. No  axillary adenopathy is appreciated. The osseous structures of the thorax  have a normal appearance. Subcutaneous gas is noted in the left chest  wall.     FINDINGS OF THE ABDOMEN AND PELVIS: A 5.9 x 4.2 cm cyst is seen at the  superior pole of the right kidney. The adrenal glands, pancreas, spleen  and liver have a normal noncontrasted appearance. The left kidney has a  normal noncontrasted appearance. No abnormality of the bowel is  appreciated. Abundant gas is seen within the left hemiabdominal soft  tissues. The gas extends along the left lateral hemithorax/flank region  into the left lower quadrant soft tissues and left gluteal region.                  XR Chest 1 View (Preliminary result) Result time:  03/09/17 17:45:23     Preliminary result by [email or web address], Rad Results [place] In (03/09/17 17:45:23)     Impression:     1. Soft tissue swelling of the left forearm without evidence for  underlying bony abnormality.        2. Negative left humerus radiographs.        3. Mild bilateral perihilar atelectasis.        Narrative:     EXAMINATIONS: SINGLE VIEW CHEST, LEFT HUMERUS AND 2 VIEWS LEFT FOREARM  RADIOGRAPHS     HISTORY 61-year-old female with a history of a discolored left arm after  being found in the bathtub     FINDINGS: An AP supine chest radiograph was obtained. Comparison is made  to chest CT dated 05/04/2007. The lungs are decreased in volume but are  clear of consolidation. Bilateral perihilar atelectasis is noted. The  patient is slightly rotated to the right. No evidence for pneumothorax  or pleural effusion is appreciated.     AP and lateral radiographs of the left humerus were obtained and  demonstrate no evidence for a bony or soft tissue abnormality.     AP and lateral radiographs of the left forearm were obtained and  demonstrate no evidence for a bony abnormality. Overlying soft tissue  swelling is noted.                  XR Forearm 2 View Left (Preliminary result) Result time: 03/09/17 17:45:23     Preliminary result by [email or web address], [facility] Results [place] In (03/09/17 17:45:23)     Impression:     1. Soft tissue swelling of the left forearm without evidence for  underlying bony abnormality.        2. Negative left humerus radiographs.        3. Mild bilateral perihilar atelectasis.        Narrative:     EXAMINATIONS: SINGLE VIEW CHEST, LEFT HUMERUS AND 2 VIEWS LEFT FOREARM  RADIOGRAPHS     HISTORY 61-year-old female with a history of a discolored left arm after  being found in the bathtub     FINDINGS: An AP supine chest radiograph was obtained. Comparison is made  to chest CT dated 05/04/2007. The lungs are decreased in volume but are  clear of consolidation. Bilateral perihilar atelectasis is  noted. The  patient is slightly rotated to the right. No evidence for pneumothorax  or pleural effusion is appreciated.     AP and lateral radiographs of the left humerus were obtained and  demonstrate no evidence for a bony or soft tissue abnormality.     AP and lateral radiographs of the left forearm were obtained and  demonstrate no evidence for a bony abnormality. Overlying soft tissue  swelling is noted.                  XR Humerus Left (Preliminary result) Result time: 03/09/17 17:45:23     Preliminary result by Interface, Rad Results [place] In (03/09/17 17:45:23)     Impression:     1. Soft tissue swelling of the left forearm without evidence for  underlying bony abnormality.        2. Negative left humerus radiographs.        3. Mild bilateral perihilar atelectasis.        Narrative:     EXAMINATIONS: SINGLE VIEW CHEST, LEFT HUMERUS AND 2 VIEWS LEFT FOREARM  RADIOGRAPHS     HISTORY 61-year-old female with a history of a discolored left arm after  being found in the bathtub     FINDINGS: An AP supine chest radiograph was obtained. Comparison is made  to chest CT dated 05/04/2007. The lungs are decreased in volume but are  clear of consolidation. Bilateral perihilar atelectasis is noted. The  patient is slightly rotated to the right. No evidence for pneumothorax  or pleural effusion is appreciated.     AP and lateral radiographs of the left humerus were obtained and  demonstrate no evidence for a bony or soft tissue abnormality.     AP and lateral radiographs of the left forearm were obtained and  demonstrate no evidence for a bony abnormality. Overlying soft tissue  swelling is noted.          I ordered the above noted radiological studies and reviewed the images on the PACS system.         EKG    EKG was interpreted by Dr. Cyr. See Dr Cyr's note for EKG interpretation.         PROCEDURES  Critical Care  Performed by: FILI HERR  Authorized by: ZACK CYR   Total critical care  time: 45 minutes  Critical care time was exclusive of separately billable procedures and treating other patients.  Critical care was necessary to treat or prevent imminent or life-threatening deterioration of the following conditions: dehydration, renal failure, metabolic crisis, sepsis and trauma.  Critical care was time spent personally by me on the following activities: development of treatment plan with patient or surrogate, discussions with consultants, discussions with primary provider, evaluation of patient's response to treatment, examination of patient, obtaining history from patient or surrogate, ordering and performing treatments and interventions, ordering and review of laboratory studies, ordering and review of radiographic studies, pulse oximetry and re-evaluation of patient's condition.            PROGRESS AND CONSULTS  2:50 PM- Reviewed pt's history and workup with Dr. Olguin.  At bedside evaluation, they agree with the plan of care.  2:54 PM- BP is 148/106. O2 sat is 100% on 3L O2 nasal cannula.   3:03 PM- Temperature is 95.3F. Ordered IV fluids for hydration and to cover for rhabdomyolysis, morphine and zofran for pain, and warming blanket.   3:10 PM- Discussed with pt's brother and sister-in-law over the phone regarding pt's serious condition. Informed them about plan to perform labs and imaging and to provide necessary treatments.   3:42 PM- Rechecked pt. She is sleeping comfortably and is in no acute distress. HR is in 80s. O2 sat is 96% on 2L O2 nasal cannula. Pt currently being warmed by warming blanket.   4:12 PM- WBC count is 17.6. Ordered zosyn.  5:40 PM- Lactic acid is 3.8. Ordered clindamycin and vancomycin.   5:57 PM- Dr Olguin discussed case with Dr Jenkins (intensivist)  Dr Olguin reviewed history, exam, results and treatments and discussed concerns and plan of care. Dr Jenkins accepts pt to be admitted to ICU and would like on call nephrologist to consult.  5:58 PM- Rechecked pt.  She is feeling better after ED treatments. HR is in 90s. O2 sat is 97% on O2 nasal cannula. Discussed with pt about all pertinent lab results and imaging results. Discussed pt admission for further care, general surgery consult, nephrology consult, and observation in the ICU. Informed pt that she is in serious condition. Pt verbalizes understanding and agrees with plan. Pt is ready for admission.   6:00 PM- Dr Olguin discussed case with Dr Fried (general surgeon)  Dr Olguin reviewed history, exam, results and treatments and discussed concerns and plan of care. Dr Fried will consult.   6:11 PM- Dr Olguin discussed case with Dr Edgar (nephrologist)  Dr Olguin reviewed history, exam, results and treatments and discussed concerns and plan of care. Dr Edgar will consult and would like for pt to be started on sodium bicarb drip (has been ordered).   6:46 PM- See Dr Olguin's note for further details.       ADMISSION- ICU    Discussed treatment plan and reason for admission with pt and admitting physician.  Pt voiced understanding of the plan for admission for further testing/treatment as needed.      DIAGNOSIS  Final diagnoses:   Acute renal failure, unspecified acute renal failure type   Fall, initial encounter   Sepsis, due to unspecified organism   Metabolic acidosis   Hypothermia, initial encounter   Necrotizing fasciitis   Traumatic rhabdomyolysis, initial encounter       COURSE & MEDICAL DECISION MAKING  Pertinent Labs and Imaging studies that were ordered and reviewed are noted above.  Results were reviewed/discussed with the patient and they were also made aware of online assess.   Pt also made aware that some labs, such as cultures, will not be resulted during ER visit and follow up with PMD is necessary.     MEDICATIONS GIVEN IN ER  Medications   sodium chloride 0.9 % flush 10 mL (not administered)   sodium chloride 0.9 % flush 1-10 mL (not administered)   ipratropium-albuterol (DUO-NEB) nebulizer  "solution 3 mL (not administered)   aluminum-magnesium hydroxide-simethicone (MAALOX/MYLANTA) suspension 30 mL (not administered)   bisacodyl (DULCOLAX) EC tablet 5 mg (not administered)   bisacodyl (DULCOLAX) suppository 10 mg (not administered)   acetaminophen (TYLENOL) tablet 650 mg (not administered)     Or   acetaminophen (TYLENOL) suppository 650 mg (not administered)   ondansetron (ZOFRAN) tablet 4 mg (not administered)     Or   ondansetron ODT (ZOFRAN-ODT) disintegrating tablet 4 mg (not administered)     Or   ondansetron (ZOFRAN) injection 4 mg (not administered)   enoxaparin (LOVENOX) syringe 30 mg (not administered)   piperacillin-tazobactam (ZOSYN) 3.375 g in dextrose 50 mL IVPB (premix) (not administered)   Pharmacy to dose vancomycin (not administered)   sodium chloride 0.9 % infusion (not administered)   sodium bicarbonate 150 mEq in dextrose (D5W) 5 % 1,000 mL infusion (greater than 75 mEq) (not administered)   Vancomycin Pharmacy Intermittent Dosing (not administered)   sodium chloride 0.9 % bolus 1,000 mL (0 mL Intravenous Stopped 3/9/17 1811)   morphine injection 4 mg (4 mg Intravenous Given 3/9/17 1504)   ondansetron (ZOFRAN) injection 4 mg (4 mg Intravenous Given 3/9/17 1504)   piperacillin-tazobactam (ZOSYN) 4.5 g in dextrose 100 mL IVPB (premix) (0 g Intravenous Stopped 3/9/17 1759)   vancomycin 2250 mg/500 mL 0.9% NS IVPB ([facility]) (2,250 mg Intravenous New Bag 3/9/17 1759)   clindamycin (CLEOCIN) 600 mg in dextrose 5% 50 mL IVPB (premix) (600 mg Intravenous New Bag 3/9/17 1810)       Visit Vitals   • /78 (BP Location: Right arm, Patient Position: Lying)   • Pulse 92   • Temp 96.8 °F (36 °C) (Rectal)   • Resp 18   • Ht 64\" (162.6 cm)   • Wt 250 lb (113 kg)   • SpO2 97%   • BMI 42.91 kg/m2         I personally reviewed the past medical history, past surgical history, social history, family history, current medications and allergies as they appear in this chart.  The scribe's note accurately " reflects the work and decisions made by me.     I personally scribed for ABHISHEK Lovett on 3/9/2017 at 6:42 PM.  Electronically signed by George العلي on 3/9/2017 at time 6:42 PM       George العلي  03/09/17 [id number]       Kacie Solis, ALYSSA  03/09/17 [id number]

## 2017-03-10 ENCOUNTER — APPOINTMENT (OUTPATIENT)
Dept: CARDIOLOGY | Facility: HOSPITAL | Age: 62
End: 2017-03-10
Attending: INTERNAL MEDICINE

## 2017-03-10 ENCOUNTER — APPOINTMENT (OUTPATIENT)
Dept: GENERAL RADIOLOGY | Facility: HOSPITAL | Age: 62
End: 2017-03-10
Attending: INTERNAL MEDICINE

## 2017-03-10 ENCOUNTER — APPOINTMENT (OUTPATIENT)
Dept: GENERAL RADIOLOGY | Facility: HOSPITAL | Age: 62
End: 2017-03-10

## 2017-03-10 ENCOUNTER — ANESTHESIA EVENT (OUTPATIENT)
Dept: PERIOP | Facility: HOSPITAL | Age: 62
End: 2017-03-10

## 2017-03-10 ENCOUNTER — ANESTHESIA (OUTPATIENT)
Dept: PERIOP | Facility: HOSPITAL | Age: 62
End: 2017-03-10

## 2017-03-10 LAB
ALBUMIN SERPL-MCNC: 2.4 G/DL (ref 3.5–5.2)
ALBUMIN SERPL-MCNC: 2.7 G/DL (ref 3.5–5.2)
ALBUMIN SERPL-MCNC: 2.7 G/DL (ref 3.5–5.2)
ALBUMIN/GLOB SERPL: 0.8 G/DL
ALP SERPL-CCNC: 145 U/L (ref 39–117)
ALT SERPL W P-5'-P-CCNC: 137 U/L (ref 1–33)
ANION GAP SERPL CALCULATED.3IONS-SCNC: 16.2 MMOL/L
ANION GAP SERPL CALCULATED.3IONS-SCNC: 19.9 MMOL/L
ANION GAP SERPL CALCULATED.3IONS-SCNC: 26.6 MMOL/L
ARTERIAL PATENCY WRIST A: ABNORMAL
AST SERPL-CCNC: 349 U/L (ref 1–32)
ATMOSPHERIC PRESS: 756.9 MMHG
BACTERIA BLD CULT: NORMAL
BASE EXCESS BLDA CALC-SCNC: 5.3 MMOL/L (ref 0–2)
BDY SITE: ABNORMAL
BH CV ECHO MEAS - ACS: 1.9 CM
BH CV ECHO MEAS - AO MEAN PG (FULL): 3 MMHG
BH CV ECHO MEAS - AO MEAN PG: 7 MMHG
BH CV ECHO MEAS - AO ROOT AREA (BSA CORRECTED): 1.2
BH CV ECHO MEAS - AO ROOT AREA: 4.9 CM^2
BH CV ECHO MEAS - AO ROOT DIAM: 2.5 CM
BH CV ECHO MEAS - AO V2 MAX: 177 CM/SEC
BH CV ECHO MEAS - AO V2 MEAN: 116 CM/SEC
BH CV ECHO MEAS - AO V2 VTI: 25.3 CM
BH CV ECHO MEAS - AVA(I,A): 2.1 CM^2
BH CV ECHO MEAS - AVA(I,D): 2.1 CM^2
BH CV ECHO MEAS - BSA(HAYCOCK): 2.2 M^2
BH CV ECHO MEAS - BSA: 2.1 M^2
BH CV ECHO MEAS - BZI_BMI: 39 KILOGRAMS/M^2
BH CV ECHO MEAS - BZI_METRIC_HEIGHT: 162.6 CM
BH CV ECHO MEAS - BZI_METRIC_WEIGHT: 103 KG
BH CV ECHO MEAS - CONTRAST EF (2CH): 61.3 ML/M^2
BH CV ECHO MEAS - CONTRAST EF 4CH: 62.5 ML/M^2
BH CV ECHO MEAS - EDV(CUBED): 68.9 ML
BH CV ECHO MEAS - EDV(MOD-SP2): 31 ML
BH CV ECHO MEAS - EDV(MOD-SP4): 80 ML
BH CV ECHO MEAS - EDV(TEICH): 74.2 ML
BH CV ECHO MEAS - EF(CUBED): 26.5 %
BH CV ECHO MEAS - EF(MOD-SP2): 61.3 %
BH CV ECHO MEAS - EF(MOD-SP4): 62.5 %
BH CV ECHO MEAS - EF(TEICH): 21.7 %
BH CV ECHO MEAS - ESV(CUBED): 50.7 ML
BH CV ECHO MEAS - ESV(MOD-SP2): 12 ML
BH CV ECHO MEAS - ESV(MOD-SP4): 30 ML
BH CV ECHO MEAS - ESV(TEICH): 58.1 ML
BH CV ECHO MEAS - FS: 9.8 %
BH CV ECHO MEAS - IVS/LVPW: 1
BH CV ECHO MEAS - IVSD: 1.2 CM
BH CV ECHO MEAS - LAT PEAK E' VEL: 6 CM/SEC
BH CV ECHO MEAS - LV DIASTOLIC VOL/BSA (35-75): 38.8 ML/M^2
BH CV ECHO MEAS - LV MASS(C)D: 171.7 GRAMS
BH CV ECHO MEAS - LV MASS(C)DI: 83.2 GRAMS/M^2
BH CV ECHO MEAS - LV MEAN PG: 4 MMHG
BH CV ECHO MEAS - LV SYSTOLIC VOL/BSA (12-30): 14.5 ML/M^2
BH CV ECHO MEAS - LV V1 MAX: 131 CM/SEC
BH CV ECHO MEAS - LV V1 MEAN: 84.7 CM/SEC
BH CV ECHO MEAS - LV V1 VTI: 19.1 CM
BH CV ECHO MEAS - LVIDD: 4.1 CM
BH CV ECHO MEAS - LVIDS: 3.7 CM
BH CV ECHO MEAS - LVLD AP2: 6.7 CM
BH CV ECHO MEAS - LVLD AP4: 6.6 CM
BH CV ECHO MEAS - LVLS AP2: 4.9 CM
BH CV ECHO MEAS - LVLS AP4: 5.8 CM
BH CV ECHO MEAS - LVOT AREA (M): 2.8 CM^2
BH CV ECHO MEAS - LVOT AREA: 2.8 CM^2
BH CV ECHO MEAS - LVOT DIAM: 1.9 CM
BH CV ECHO MEAS - LVPWD: 1.2 CM
BH CV ECHO MEAS - MED PEAK E' VEL: 8 CM/SEC
BH CV ECHO MEAS - MV A DUR: 116 SEC
BH CV ECHO MEAS - MV A MAX VEL: 128 CM/SEC
BH CV ECHO MEAS - MV DEC SLOPE: 786 CM/SEC^2
BH CV ECHO MEAS - MV DEC TIME: 173 SEC
BH CV ECHO MEAS - MV E MAX VEL: 83.9 CM/SEC
BH CV ECHO MEAS - MV E/A: 0.66
BH CV ECHO MEAS - MV MEAN PG: 6 MMHG
BH CV ECHO MEAS - MV P1/2T MAX VEL: 143 CM/SEC
BH CV ECHO MEAS - MV P1/2T: 53.3 MSEC
BH CV ECHO MEAS - MV V2 MEAN: 115 CM/SEC
BH CV ECHO MEAS - MV V2 VTI: 30 CM
BH CV ECHO MEAS - MVA P1/2T LCG: 1.5 CM^2
BH CV ECHO MEAS - MVA(P1/2T): 4.1 CM^2
BH CV ECHO MEAS - MVA(VTI): 1.8 CM^2
BH CV ECHO MEAS - PA ACC SLOPE: 25.7 CM/SEC^2
BH CV ECHO MEAS - PA ACC TIME: 0.11 SEC
BH CV ECHO MEAS - PA MAX PG: 6.7 MMHG
BH CV ECHO MEAS - PA PR(ACCEL): 31.3 MMHG
BH CV ECHO MEAS - PA V2 MAX: 129 CM/SEC
BH CV ECHO MEAS - PULM A REVS DUR: 109 SEC
BH CV ECHO MEAS - PULM A REVS VEL: 49.4 CM/SEC
BH CV ECHO MEAS - PULM DIAS VEL: 50.3 CM/SEC
BH CV ECHO MEAS - PULM S/D: 1.1
BH CV ECHO MEAS - PULM SYS VEL: 54.3 CM/SEC
BH CV ECHO MEAS - QP/QS: 0.89
BH CV ECHO MEAS - RV MEAN PG: 4 MMHG
BH CV ECHO MEAS - RV V1 MEAN: 89.5 CM/SEC
BH CV ECHO MEAS - RV V1 VTI: 18.9 CM
BH CV ECHO MEAS - RVOT AREA: 2.5 CM^2
BH CV ECHO MEAS - RVOT DIAM: 1.8 CM
BH CV ECHO MEAS - SI(AO): 60.2 ML/M^2
BH CV ECHO MEAS - SI(CUBED): 8.8 ML/M^2
BH CV ECHO MEAS - SI(LVOT): 26.2 ML/M^2
BH CV ECHO MEAS - SI(MOD-SP2): 9.2 ML/M^2
BH CV ECHO MEAS - SI(MOD-SP4): 24.2 ML/M^2
BH CV ECHO MEAS - SI(TEICH): 7.8 ML/M^2
BH CV ECHO MEAS - SV(AO): 124.2 ML
BH CV ECHO MEAS - SV(CUBED): 18.3 ML
BH CV ECHO MEAS - SV(LVOT): 54.2 ML
BH CV ECHO MEAS - SV(MOD-SP2): 19 ML
BH CV ECHO MEAS - SV(MOD-SP4): 50 ML
BH CV ECHO MEAS - SV(RVOT): 48.1 ML
BH CV ECHO MEAS - SV(TEICH): 16.1 ML
BH CV ECHO MEAS - TAPSE (>1.6): 2.2 CM2
BH CV XLRA - RV BASE: 3 CM
BH CV XLRA - TDI S': 21 CM/SEC
BILIRUB SERPL-MCNC: 0.3 MG/DL (ref 0.1–1.2)
BUN BLD-MCNC: 37 MG/DL (ref 8–23)
BUN BLD-MCNC: 37 MG/DL (ref 8–23)
BUN BLD-MCNC: 41 MG/DL (ref 8–23)
BUN/CREAT SERPL: 12.6 (ref 7–25)
BUN/CREAT SERPL: 13.7 (ref 7–25)
BUN/CREAT SERPL: 13.7 (ref 7–25)
CALCIUM SPEC-SCNC: 6.5 MG/DL (ref 8.6–10.5)
CALCIUM SPEC-SCNC: 7.4 MG/DL (ref 8.6–10.5)
CALCIUM SPEC-SCNC: 7.4 MG/DL (ref 8.6–10.5)
CHLORIDE SERPL-SCNC: 100 MMOL/L (ref 98–107)
CHLORIDE SERPL-SCNC: 100 MMOL/L (ref 98–107)
CHLORIDE SERPL-SCNC: 97 MMOL/L (ref 98–107)
CHLORIDE UR-SCNC: 27 MMOL/L
CK SERPL-CCNC: ABNORMAL U/L (ref 20–180)
CO2 SERPL-SCNC: 19.4 MMOL/L (ref 22–29)
CO2 SERPL-SCNC: 23.1 MMOL/L (ref 22–29)
CO2 SERPL-SCNC: 30.8 MMOL/L (ref 22–29)
CREAT BLD-MCNC: 2.7 MG/DL (ref 0.57–1)
CREAT BLD-MCNC: 2.7 MG/DL (ref 0.57–1)
CREAT BLD-MCNC: 3.25 MG/DL (ref 0.57–1)
CREAT UR-MCNC: 166.3 MG/DL
D-LACTATE SERPL-SCNC: 1.4 MMOL/L (ref 0.5–2)
D-LACTATE SERPL-SCNC: 2.6 MMOL/L (ref 0.5–2)
D-LACTATE SERPL-SCNC: 2.8 MMOL/L (ref 0.5–2)
D-LACTATE SERPL-SCNC: 3.6 MMOL/L (ref 0.5–2)
D-LACTATE SERPL-SCNC: 4.1 MMOL/L (ref 0.5–2)
DEPRECATED RDW RBC AUTO: 48.9 FL (ref 37–54)
E/E' RATIO: 11.5
ERYTHROCYTE [DISTWIDTH] IN BLOOD BY AUTOMATED COUNT: 16 % (ref 11.7–13)
GFR SERPL CREATININE-BSD FRML MDRD: 14 ML/MIN/1.73
GFR SERPL CREATININE-BSD FRML MDRD: 18 ML/MIN/1.73
GFR SERPL CREATININE-BSD FRML MDRD: 18 ML/MIN/1.73
GLOBULIN UR ELPH-MCNC: 3.5 GM/DL
GLUCOSE BLD-MCNC: 220 MG/DL (ref 65–99)
GLUCOSE BLD-MCNC: 227 MG/DL (ref 65–99)
GLUCOSE BLD-MCNC: 234 MG/DL (ref 65–99)
GLUCOSE BLDC GLUCOMTR-MCNC: 130 MG/DL (ref 70–130)
GLUCOSE BLDC GLUCOMTR-MCNC: 147 MG/DL (ref 70–130)
GLUCOSE BLDC GLUCOMTR-MCNC: 214 MG/DL (ref 70–130)
GLUCOSE BLDC GLUCOMTR-MCNC: 216 MG/DL (ref 70–130)
HCO3 BLDA-SCNC: 33 MMOL/L (ref 22–28)
HCT VFR BLD AUTO: 43 % (ref 35.6–45.5)
HGB BLD-MCNC: 13.4 G/DL (ref 11.9–15.5)
HOROWITZ INDEX BLD+IHG-RTO: 100 %
LEFT ATRIUM VOLUME INDEX: 14.1 ML/M2
LV EF 2D ECHO EST: 63 %
MCH RBC QN AUTO: 26 PG (ref 26.9–32)
MCHC RBC AUTO-ENTMCNC: 31.2 G/DL (ref 32.4–36.3)
MCV RBC AUTO: 83.3 FL (ref 80.5–98.2)
MODALITY: ABNORMAL
O2 A-A PPRESDIFF RESPIRATORY: 0.5 MMHG
PCO2 BLDA: 60.3 MM HG (ref 35–45)
PEEP RESPIRATORY: 5 CM[H2O]
PH BLDA: 7.34 PH UNITS (ref 7.35–7.45)
PHOSPHATE SERPL-MCNC: 6.2 MG/DL (ref 2.5–4.5)
PHOSPHATE SERPL-MCNC: 6.5 MG/DL (ref 2.5–4.5)
PLATELET # BLD AUTO: 314 10*3/MM3 (ref 140–500)
PMV BLD AUTO: 10.4 FL (ref 6–12)
PO2 BLDA: 335.6 MM HG (ref 80–100)
POTASSIUM BLD-SCNC: 4.1 MMOL/L (ref 3.5–5.2)
POTASSIUM BLD-SCNC: 4.4 MMOL/L (ref 3.5–5.2)
POTASSIUM BLD-SCNC: 4.5 MMOL/L (ref 3.5–5.2)
PROT SERPL-MCNC: 6.2 G/DL (ref 6–8.5)
PROT UR-MCNC: 401 MG/DL
PROT/CREAT UR: 2411.3 MG/G CREA
RBC # BLD AUTO: 5.16 10*6/MM3 (ref 3.9–5.2)
SAO2 % BLDCOA: 99.9 % (ref 92–99)
SET MECH RESP RATE: 12
SODIUM BLD-SCNC: 143 MMOL/L (ref 136–145)
SODIUM BLD-SCNC: 144 MMOL/L (ref 136–145)
SODIUM BLD-SCNC: 146 MMOL/L (ref 136–145)
SODIUM UR-SCNC: 41 MMOL/L
TOTAL RATE: 12 BREATHS/MINUTE
VANCOMYCIN SERPL-MCNC: 24.6 MCG/ML (ref 5–40)
VENTILATOR MODE: AC
VT ON VENT VENT: 450 ML
WBC NRBC COR # BLD: 20.89 10*3/MM3 (ref 4.5–10.7)

## 2017-03-10 PROCEDURE — 25010000002 VANCOMYCIN: Performed by: INTERNAL MEDICINE

## 2017-03-10 PROCEDURE — 93306 TTE W/DOPPLER COMPLETE: CPT

## 2017-03-10 PROCEDURE — 80053 COMPREHEN METABOLIC PANEL: CPT | Performed by: INTERNAL MEDICINE

## 2017-03-10 PROCEDURE — 25010000002 NEOSTIGMINE 10 MG/10ML SOLUTION: Performed by: ANESTHESIOLOGY

## 2017-03-10 PROCEDURE — 71010 HC CHEST PA OR AP: CPT

## 2017-03-10 PROCEDURE — 25010000002 PIPERACILLIN SOD-TAZOBACTAM PER 1 G: Performed by: INTERNAL MEDICINE

## 2017-03-10 PROCEDURE — 25010000002 HYDROMORPHONE PER 4 MG: Performed by: ANESTHESIOLOGY

## 2017-03-10 PROCEDURE — 25010000002 SUCCINYLCHOLINE PER 20 MG: Performed by: ANESTHESIOLOGY

## 2017-03-10 PROCEDURE — 93306 TTE W/DOPPLER COMPLETE: CPT | Performed by: INTERNAL MEDICINE

## 2017-03-10 PROCEDURE — 25010000002 PHENYLEPHRINE PER 1 ML: Performed by: ANESTHESIOLOGY

## 2017-03-10 PROCEDURE — 83605 ASSAY OF LACTIC ACID: CPT | Performed by: INTERNAL MEDICINE

## 2017-03-10 PROCEDURE — 0JBC0ZZ EXCISION OF PELVIC REGION SUBCUTANEOUS TISSUE AND FASCIA, OPEN APPROACH: ICD-10-PCS | Performed by: SURGERY

## 2017-03-10 PROCEDURE — 82962 GLUCOSE BLOOD TEST: CPT

## 2017-03-10 PROCEDURE — 94799 UNLISTED PULMONARY SVC/PX: CPT

## 2017-03-10 PROCEDURE — 84156 ASSAY OF PROTEIN URINE: CPT | Performed by: INTERNAL MEDICINE

## 2017-03-10 PROCEDURE — 87102 FUNGUS ISOLATION CULTURE: CPT | Performed by: SURGERY

## 2017-03-10 PROCEDURE — 82803 BLOOD GASES ANY COMBINATION: CPT

## 2017-03-10 PROCEDURE — 25010000002 PROPOFOL 10 MG/ML EMULSION

## 2017-03-10 PROCEDURE — 36600 WITHDRAWAL OF ARTERIAL BLOOD: CPT

## 2017-03-10 PROCEDURE — 87147 CULTURE TYPE IMMUNOLOGIC: CPT | Performed by: SURGERY

## 2017-03-10 PROCEDURE — 94002 VENT MGMT INPAT INIT DAY: CPT

## 2017-03-10 PROCEDURE — 82436 ASSAY OF URINE CHLORIDE: CPT | Performed by: INTERNAL MEDICINE

## 2017-03-10 PROCEDURE — 87176 TISSUE HOMOGENIZATION CULTR: CPT | Performed by: SURGERY

## 2017-03-10 PROCEDURE — 82570 ASSAY OF URINE CREATININE: CPT | Performed by: INTERNAL MEDICINE

## 2017-03-10 PROCEDURE — 11005 DBRDMT SKIN ABDOMINAL WALL: CPT | Performed by: PHYSICIAN ASSISTANT

## 2017-03-10 PROCEDURE — 80202 ASSAY OF VANCOMYCIN: CPT | Performed by: INTERNAL MEDICINE

## 2017-03-10 PROCEDURE — 99231 SBSQ HOSP IP/OBS SF/LOW 25: CPT | Performed by: SURGERY

## 2017-03-10 PROCEDURE — 87070 CULTURE OTHR SPECIMN AEROBIC: CPT | Performed by: SURGERY

## 2017-03-10 PROCEDURE — 11005 DBRDMT SKIN ABDOMINAL WALL: CPT | Performed by: SURGERY

## 2017-03-10 PROCEDURE — 25010000002 PROPOFOL 10 MG/ML EMULSION: Performed by: ANESTHESIOLOGY

## 2017-03-10 PROCEDURE — 80069 RENAL FUNCTION PANEL: CPT | Performed by: INTERNAL MEDICINE

## 2017-03-10 PROCEDURE — 25010000002 FENTANYL CITRATE (PF) 100 MCG/2ML SOLUTION: Performed by: ANESTHESIOLOGY

## 2017-03-10 PROCEDURE — 25010000002 PROPOFOL 1000 MG/ML EMULSION: Performed by: INTERNAL MEDICINE

## 2017-03-10 PROCEDURE — 25010000002 FENTANYL CITRATE (PF) 100 MCG/2ML SOLUTION: Performed by: INTERNAL MEDICINE

## 2017-03-10 PROCEDURE — 25010000002 ONDANSETRON PER 1 MG: Performed by: INTERNAL MEDICINE

## 2017-03-10 PROCEDURE — 02H633Z INSERTION OF INFUSION DEVICE INTO RIGHT ATRIUM, PERCUTANEOUS APPROACH: ICD-10-PCS | Performed by: INTERNAL MEDICINE

## 2017-03-10 PROCEDURE — 85027 COMPLETE CBC AUTOMATED: CPT | Performed by: INTERNAL MEDICINE

## 2017-03-10 PROCEDURE — 87186 SC STD MICRODIL/AGAR DIL: CPT | Performed by: SURGERY

## 2017-03-10 PROCEDURE — 5A1945Z RESPIRATORY VENTILATION, 24-96 CONSECUTIVE HOURS: ICD-10-PCS | Performed by: INTERNAL MEDICINE

## 2017-03-10 PROCEDURE — 84300 ASSAY OF URINE SODIUM: CPT | Performed by: INTERNAL MEDICINE

## 2017-03-10 PROCEDURE — 87205 SMEAR GRAM STAIN: CPT | Performed by: SURGERY

## 2017-03-10 PROCEDURE — 0BH17EZ INSERTION OF ENDOTRACHEAL AIRWAY INTO TRACHEA, VIA NATURAL OR ARTIFICIAL OPENING: ICD-10-PCS | Performed by: INTERNAL MEDICINE

## 2017-03-10 PROCEDURE — 82550 ASSAY OF CK (CPK): CPT | Performed by: INTERNAL MEDICINE

## 2017-03-10 RX ORDER — FLUMAZENIL 0.1 MG/ML
0.2 INJECTION INTRAVENOUS AS NEEDED
Status: DISCONTINUED | OUTPATIENT
Start: 2017-03-10 | End: 2017-03-10 | Stop reason: HOSPADM

## 2017-03-10 RX ORDER — PROMETHAZINE HYDROCHLORIDE 25 MG/1
12.5 TABLET ORAL ONCE AS NEEDED
Status: DISCONTINUED | OUTPATIENT
Start: 2017-03-10 | End: 2017-03-10 | Stop reason: HOSPADM

## 2017-03-10 RX ORDER — HYDRALAZINE HYDROCHLORIDE 20 MG/ML
5 INJECTION INTRAMUSCULAR; INTRAVENOUS
Status: DISCONTINUED | OUTPATIENT
Start: 2017-03-10 | End: 2017-03-10 | Stop reason: HOSPADM

## 2017-03-10 RX ORDER — HYDROMORPHONE HYDROCHLORIDE 1 MG/ML
0.5 INJECTION, SOLUTION INTRAMUSCULAR; INTRAVENOUS; SUBCUTANEOUS
Status: DISCONTINUED | OUTPATIENT
Start: 2017-03-10 | End: 2017-03-10 | Stop reason: HOSPADM

## 2017-03-10 RX ORDER — DIPHENHYDRAMINE HYDROCHLORIDE 50 MG/ML
12.5 INJECTION INTRAMUSCULAR; INTRAVENOUS
Status: DISCONTINUED | OUTPATIENT
Start: 2017-03-10 | End: 2017-03-10 | Stop reason: HOSPADM

## 2017-03-10 RX ORDER — PROMETHAZINE HYDROCHLORIDE 25 MG/ML
12.5 INJECTION, SOLUTION INTRAMUSCULAR; INTRAVENOUS ONCE AS NEEDED
Status: DISCONTINUED | OUTPATIENT
Start: 2017-03-10 | End: 2017-03-10 | Stop reason: HOSPADM

## 2017-03-10 RX ORDER — GLYCOPYRROLATE 0.2 MG/ML
INJECTION INTRAMUSCULAR; INTRAVENOUS AS NEEDED
Status: DISCONTINUED | OUTPATIENT
Start: 2017-03-10 | End: 2017-03-10 | Stop reason: SURG

## 2017-03-10 RX ORDER — PROMETHAZINE HYDROCHLORIDE 25 MG/1
25 TABLET ORAL ONCE AS NEEDED
Status: DISCONTINUED | OUTPATIENT
Start: 2017-03-10 | End: 2017-03-10 | Stop reason: HOSPADM

## 2017-03-10 RX ORDER — FAMOTIDINE 10 MG/ML
20 INJECTION, SOLUTION INTRAVENOUS ONCE
Status: COMPLETED | OUTPATIENT
Start: 2017-03-10 | End: 2017-03-10

## 2017-03-10 RX ORDER — NICOTINE POLACRILEX 4 MG
15 LOZENGE BUCCAL
Status: DISCONTINUED | OUTPATIENT
Start: 2017-03-10 | End: 2017-03-21 | Stop reason: HOSPADM

## 2017-03-10 RX ORDER — LIDOCAINE HYDROCHLORIDE 20 MG/ML
INJECTION, SOLUTION INFILTRATION; PERINEURAL AS NEEDED
Status: DISCONTINUED | OUTPATIENT
Start: 2017-03-10 | End: 2017-03-10 | Stop reason: SURG

## 2017-03-10 RX ORDER — ROCURONIUM BROMIDE 10 MG/ML
INJECTION, SOLUTION INTRAVENOUS AS NEEDED
Status: DISCONTINUED | OUTPATIENT
Start: 2017-03-10 | End: 2017-03-10 | Stop reason: SURG

## 2017-03-10 RX ORDER — DEXTROSE MONOHYDRATE 25 G/50ML
25 INJECTION, SOLUTION INTRAVENOUS
Status: DISCONTINUED | OUTPATIENT
Start: 2017-03-10 | End: 2017-03-21 | Stop reason: HOSPADM

## 2017-03-10 RX ORDER — NALOXONE HCL 0.4 MG/ML
0.2 VIAL (ML) INJECTION AS NEEDED
Status: DISCONTINUED | OUTPATIENT
Start: 2017-03-10 | End: 2017-03-10 | Stop reason: HOSPADM

## 2017-03-10 RX ORDER — PROPOFOL 10 MG/ML
VIAL (ML) INTRAVENOUS AS NEEDED
Status: DISCONTINUED | OUTPATIENT
Start: 2017-03-10 | End: 2017-03-10 | Stop reason: SURG

## 2017-03-10 RX ORDER — FENTANYL CITRATE 50 UG/ML
25 INJECTION, SOLUTION INTRAMUSCULAR; INTRAVENOUS
Status: DISCONTINUED | OUTPATIENT
Start: 2017-03-10 | End: 2017-03-10 | Stop reason: HOSPADM

## 2017-03-10 RX ORDER — SODIUM CHLORIDE, SODIUM LACTATE, POTASSIUM CHLORIDE, CALCIUM CHLORIDE 600; 310; 30; 20 MG/100ML; MG/100ML; MG/100ML; MG/100ML
9 INJECTION, SOLUTION INTRAVENOUS CONTINUOUS
Status: DISCONTINUED | OUTPATIENT
Start: 2017-03-10 | End: 2017-03-10

## 2017-03-10 RX ORDER — NEOSTIGMINE METHYLSULFATE 1 MG/ML
INJECTION, SOLUTION INTRAVENOUS AS NEEDED
Status: DISCONTINUED | OUTPATIENT
Start: 2017-03-10 | End: 2017-03-10 | Stop reason: SURG

## 2017-03-10 RX ORDER — SODIUM CHLORIDE 9 MG/ML
150 INJECTION, SOLUTION INTRAVENOUS CONTINUOUS
Status: DISCONTINUED | OUTPATIENT
Start: 2017-03-10 | End: 2017-03-12

## 2017-03-10 RX ORDER — FENTANYL CITRATE 50 UG/ML
INJECTION, SOLUTION INTRAMUSCULAR; INTRAVENOUS
Status: DISCONTINUED | OUTPATIENT
Start: 2017-03-10 | End: 2017-03-21 | Stop reason: HOSPADM

## 2017-03-10 RX ORDER — SUCCINYLCHOLINE CHLORIDE 20 MG/ML
INJECTION INTRAMUSCULAR; INTRAVENOUS AS NEEDED
Status: DISCONTINUED | OUTPATIENT
Start: 2017-03-10 | End: 2017-03-10 | Stop reason: SURG

## 2017-03-10 RX ORDER — ONDANSETRON 2 MG/ML
4 INJECTION INTRAMUSCULAR; INTRAVENOUS ONCE AS NEEDED
Status: DISCONTINUED | OUTPATIENT
Start: 2017-03-10 | End: 2017-03-10 | Stop reason: HOSPADM

## 2017-03-10 RX ORDER — SODIUM CHLORIDE 0.9 % (FLUSH) 0.9 %
1-10 SYRINGE (ML) INJECTION AS NEEDED
Status: DISCONTINUED | OUTPATIENT
Start: 2017-03-10 | End: 2017-03-10 | Stop reason: HOSPADM

## 2017-03-10 RX ORDER — LABETALOL HYDROCHLORIDE 5 MG/ML
5 INJECTION, SOLUTION INTRAVENOUS
Status: DISCONTINUED | OUTPATIENT
Start: 2017-03-10 | End: 2017-03-10 | Stop reason: HOSPADM

## 2017-03-10 RX ORDER — MAGNESIUM HYDROXIDE 1200 MG/15ML
LIQUID ORAL AS NEEDED
Status: DISCONTINUED | OUTPATIENT
Start: 2017-03-10 | End: 2017-03-10 | Stop reason: HOSPADM

## 2017-03-10 RX ORDER — PROPOFOL 10 MG/ML
VIAL (ML) INTRAVENOUS
Status: COMPLETED
Start: 2017-03-10 | End: 2017-03-10

## 2017-03-10 RX ORDER — PROMETHAZINE HYDROCHLORIDE 25 MG/1
25 SUPPOSITORY RECTAL ONCE AS NEEDED
Status: DISCONTINUED | OUTPATIENT
Start: 2017-03-10 | End: 2017-03-10 | Stop reason: HOSPADM

## 2017-03-10 RX ORDER — OXYCODONE AND ACETAMINOPHEN 7.5; 325 MG/1; MG/1
1 TABLET ORAL ONCE AS NEEDED
Status: DISCONTINUED | OUTPATIENT
Start: 2017-03-10 | End: 2017-03-10 | Stop reason: HOSPADM

## 2017-03-10 RX ORDER — SODIUM CHLORIDE 9 MG/ML
9 INJECTION, SOLUTION INTRAVENOUS CONTINUOUS
Status: DISCONTINUED | OUTPATIENT
Start: 2017-03-10 | End: 2017-03-21

## 2017-03-10 RX ORDER — HYDROCODONE BITARTRATE AND ACETAMINOPHEN 7.5; 325 MG/1; MG/1
1 TABLET ORAL ONCE AS NEEDED
Status: DISCONTINUED | OUTPATIENT
Start: 2017-03-10 | End: 2017-03-10 | Stop reason: HOSPADM

## 2017-03-10 RX ORDER — PANTOPRAZOLE SODIUM 40 MG/1
40 TABLET, DELAYED RELEASE ORAL
Status: DISCONTINUED | OUTPATIENT
Start: 2017-03-10 | End: 2017-03-13

## 2017-03-10 RX ADMIN — PROPOFOL 20 MG: 10 INJECTION, EMULSION INTRAVENOUS at 17:48

## 2017-03-10 RX ADMIN — ONDANSETRON 4 MG: 2 INJECTION INTRAMUSCULAR; INTRAVENOUS at 18:04

## 2017-03-10 RX ADMIN — FENTANYL CITRATE 25 MCG: 50 INJECTION INTRAMUSCULAR; INTRAVENOUS at 02:22

## 2017-03-10 RX ADMIN — SODIUM BICARBONATE 200 ML/HR: 84 INJECTION, SOLUTION INTRAVENOUS at 06:24

## 2017-03-10 RX ADMIN — VANCOMYCIN HYDROCHLORIDE 500 MG: 500 INJECTION, POWDER, LYOPHILIZED, FOR SOLUTION INTRAVENOUS at 17:00

## 2017-03-10 RX ADMIN — SODIUM CHLORIDE 9 ML/HR: 9 INJECTION, SOLUTION INTRAVENOUS at 22:52

## 2017-03-10 RX ADMIN — SODIUM BICARBONATE 125 ML/HR: 84 INJECTION, SOLUTION INTRAVENOUS at 13:00

## 2017-03-10 RX ADMIN — LIDOCAINE HYDROCHLORIDE 40 MG: 20 INJECTION, SOLUTION INFILTRATION; PERINEURAL at 17:47

## 2017-03-10 RX ADMIN — FENTANYL CITRATE 50 MCG: 50 INJECTION INTRAMUSCULAR; INTRAVENOUS at 08:41

## 2017-03-10 RX ADMIN — GLYCOPYRROLATE 0.5 MG: 0.2 INJECTION INTRAMUSCULAR; INTRAVENOUS at 18:18

## 2017-03-10 RX ADMIN — FAMOTIDINE 20 MG: 10 INJECTION, SOLUTION INTRAVENOUS at 16:08

## 2017-03-10 RX ADMIN — Medication 0.12 MCG/KG/MIN: at 22:53

## 2017-03-10 RX ADMIN — SODIUM BICARBONATE 200 ML/HR: 84 INJECTION, SOLUTION INTRAVENOUS at 01:49

## 2017-03-10 RX ADMIN — ROCURONIUM BROMIDE 10 MG: 10 INJECTION INTRAVENOUS at 18:02

## 2017-03-10 RX ADMIN — NEOSTIGMINE METHYLSULFATE 2.5 MG: 1 INJECTION INTRAVENOUS at 18:18

## 2017-03-10 RX ADMIN — SODIUM CHLORIDE 9 ML/HR: 9 INJECTION, SOLUTION INTRAVENOUS at 02:08

## 2017-03-10 RX ADMIN — FENTANYL CITRATE 25 MCG: 50 INJECTION INTRAMUSCULAR; INTRAVENOUS at 16:09

## 2017-03-10 RX ADMIN — PHENYLEPHRINE HYDROCHLORIDE 50 MCG: 10 INJECTION INTRAVENOUS at 17:57

## 2017-03-10 RX ADMIN — Medication 0.04 MCG/KG/MIN: at 21:10

## 2017-03-10 RX ADMIN — TAZOBACTAM SODIUM AND PIPERACILLIN SODIUM 3.38 G: 375; 3 INJECTION, SOLUTION INTRAVENOUS at 13:24

## 2017-03-10 RX ADMIN — FENTANYL CITRATE 50 MCG: 50 INJECTION INTRAMUSCULAR; INTRAVENOUS at 04:22

## 2017-03-10 RX ADMIN — PROPOFOL 15 MCG/KG/MIN: 10 INJECTION, EMULSION INTRAVENOUS at 22:53

## 2017-03-10 RX ADMIN — PROPOFOL 100 MG: 10 INJECTION, EMULSION INTRAVENOUS at 17:47

## 2017-03-10 RX ADMIN — PROPOFOL 20 MCG/KG/MIN: 10 INJECTION, EMULSION INTRAVENOUS at 19:08

## 2017-03-10 RX ADMIN — SUCCINYLCHOLINE CHLORIDE 120 MG: 20 INJECTION, SOLUTION INTRAMUSCULAR; INTRAVENOUS; PARENTERAL at 17:47

## 2017-03-10 RX ADMIN — HYDROMORPHONE HYDROCHLORIDE 0.5 MG: 1 INJECTION, SOLUTION INTRAMUSCULAR; INTRAVENOUS; SUBCUTANEOUS at 19:18

## 2017-03-10 RX ADMIN — FENTANYL CITRATE 50 MCG: 50 INJECTION INTRAMUSCULAR; INTRAVENOUS at 22:00

## 2017-03-10 RX ADMIN — PHENYLEPHRINE HYDROCHLORIDE 100 MCG: 10 INJECTION INTRAVENOUS at 18:00

## 2017-03-10 RX ADMIN — SODIUM CHLORIDE 1000 ML: 9 INJECTION, SOLUTION INTRAVENOUS at 21:05

## 2017-03-10 RX ADMIN — PHENYLEPHRINE HYDROCHLORIDE 100 MCG: 10 INJECTION INTRAVENOUS at 18:07

## 2017-03-10 RX ADMIN — TAZOBACTAM SODIUM AND PIPERACILLIN SODIUM 3.38 G: 375; 3 INJECTION, SOLUTION INTRAVENOUS at 02:07

## 2017-03-10 RX ADMIN — FENTANYL CITRATE 50 MCG: 50 INJECTION INTRAMUSCULAR; INTRAVENOUS at 10:06

## 2017-03-10 RX ADMIN — SODIUM CHLORIDE 125 ML/HR: 9 INJECTION, SOLUTION INTRAVENOUS at 22:52

## 2017-03-10 RX ADMIN — PROPOFOL 20 MCG/KG/MIN: 10 INJECTION, EMULSION INTRAVENOUS at 20:44

## 2017-03-10 NOTE — ANESTHESIA PREPROCEDURE EVALUATION
Anesthesia Evaluation     History of anesthetic complications: no surgical hx , chart review only.     Airway   Mallampati: III  TM distance: <3 FB  Neck ROM: limited  difficult intubation highly probable  Comment: Narrow mouth, pt unable to cooperate with examine  Dental      Pulmonary    Cardiovascular       ROS comment: Ef 62    Neuro/Psych  GI/Hepatic/Renal/Endo      ROS Comment: Hx of GI bleed    Musculoskeletal         ROS comment: CT of head no acute findings  Abdominal    Substance History      OB/GYN          Other          Other Comment: Traumatic rhabdo, sepsis                            Anesthesia Plan    ASA 4     general     intravenous induction   Anesthetic plan and risks discussed with patient.

## 2017-03-10 NOTE — PROGRESS NOTES
"  PROGRESS NOTE   LOS: 0 days   Patient Care Team:  No Known Provider as PCP - General    Chief Complaint: Low urine output and to address IV fluid    Interval History: I was called by the CCU staff to address some lack of clarity on the IV fluid orders and to address her low urine output.  The patient was examined the notes and labs were reviewed I also discussed the case with the family at the bedside.    REVIEW OF SYSTEMS:   Patient is mainly complaining of some diffuse pain especially over the lower part of her body and dry mouth and wanting to drink some diet Coke.  Slightly lethargic but able to wake up and answer questions.  Confused but responsive.  Any shortness of breath or chest pain or chest pressure..     Ventilator/Non-Invasive Ventilation Settings     None            Vital Signs  Temp:  [95.3 °F (35.2 °C)-96.8 °F (36 °C)] 96.8 °F (36 °C)  Heart Rate:  [90-95] 94  Resp:  [18-25] 18  BP: (110-141)/(47-99) 110/96  SpO2:  [89 %-100 %] 100 %  on  Flow (L/min):  [2-3] 3 O2 Device: nasal cannula    Intake/Output Summary (Last 24 hours) at 03/09/17 1959  Last data filed at 03/09/17 1811   Gross per 24 hour   Intake   1000 ml   Output      0 ml   Net   1000 ml     Flowsheet Rows         First Filed Value    Admission Height  64\" (162.6 cm) Documented at 03/09/2017 1426    Admission Weight  250 lb (113 kg) Documented at 03/09/2017 1426          Physical Exam:  GEN:  In pain, no respiratory distress, slightly lethargic   EYES:   Sclera clear. No icterus. PERRL. Normal EOM  ENT:   External ears/nose normal, no oral lesions, no thrush, mucous membranes moist  NECK:  Supple, midline trachea, no JVD on the ultrasound inspection of the neck vein showed variation with respiration to a degree that is suggestive of low preload or central venous pressure  LUNGS: Normal chest on inspection, CTAB, no wheezes. No rhonchi. No crackles. Respirations regular, even and unlabored.  Diminished breath sounds in general and more " over the bases posteriorly  CV:  Regular rhythm and rate. Normal S1/S2. No murmurs, gallops, or rubs noted.  ABD:  Soft, nonspecific tenderness with no rebound process  EXT:  There is limited by pain she has upper and lower extremity edema with more involvement of the lower extremities, tender to palpation over the lower extremities especially over the hip and flank area.   Skin: dry, intact, no bleeding    Results Review:        Results from last 7 days  Lab Units 03/09/17  1644   SODIUM mmol/L 142   POTASSIUM mmol/L 4.0   CHLORIDE mmol/L 102   TOTAL CO2 mmol/L 15.9*   BUN mg/dL 32*   CREATININE mg/dL 2.24*   CALCIUM mg/dL 8.5*   AST (SGOT) U/L 429*   ALT (SGPT) U/L 145*       Results from last 7 days  Lab Units 03/09/17  1644   CK TOTAL U/L >77575*   TROPONIN T ng/mL 0.011       Results from last 7 days  Lab Units 03/09/17  1524   WBC 10*3/mm3 17.60*   HEMOGLOBIN g/dL 17.0*   HEMATOCRIT % 52.4*   PLATELETS 10*3/mm3 323   NEUTROPHIL % % 87.4*   LYMPHOCYTE % % 6.4*   MONOCYTES % % 5.7   EOSINOPHIL % % 0.1*   BASOPHIL % % 0.1   IMM GRAN % % 0.3       Results from last 7 days  Lab Units 03/09/17  1524   INR  1.18*   APTT seconds 26.5    CK level was more than 20,000            Results from last 7 days  Lab Units 03/09/17  1602   PH, ARTERIAL pH units 7.224*   PO2 ART mm Hg 84.4   PCO2, ARTERIAL mm Hg 47.1*   HCO3 ART mmol/L 19.4*         GLUCOSE   Date/Time Value Ref Range Status   03/09/2017 1901 116 70 - 130 mg/dL Final       Results from last 7 days  Lab Units 03/09/17  1644 03/09/17  1524   PROCALCITONIN ng/mL 3.44*  --    LACTATE mmol/L  --  3.8*               Results from last 7 days  Lab Units 03/09/17  1621   NITRITE UA  Negative   WBC UA /HPF 0-2   BACTERIA UA /HPF None Seen   SQUAM EPITHEL UA /HPF 3-6*               Imaging Results (last 24 hours)     Procedure Component Value Units Date/Time    CT Head Without Contrast [[id number]] Collected:  03/09/17 1742     Updated:  03/09/17 1750    Narrative:       CT  SCAN OF THE HEAD WITHOUT CONTRAST     CLINICAL HISTORY: Patient fell 2 days ago with head trauma.     TECHNIQUE: Multiple axial 3mm thick images were acquired from the base  of the skull to the vertex without contrast.     COMPARISON: None     FINDINGS: The brain and ventricular system appear structurally normal.  There is no evidence of acute infarct or hemorrhage. There is minimal  ill-defined attenuation in the white matter of her early the left  frontal lobe is likely sequela of small vessel chronic ischemic change.  There are no masses. Bone window images show no evidence of skull  fracture.       Impression:       Evidence of minimal small vessel chronic ischemic change as  described. Otherwise unremarkable CT scan of the head.     This report was finalized on 3/9/2017 5:47 PM by Dr. Rodrigue Floyd MD.       XR Chest 1 View [[id number]] Collected:  03/09/17 1745     Updated:  03/09/17 1948    Narrative:       EXAMINATIONS: SINGLE VIEW CHEST, LEFT HUMERUS AND 2 VIEWS LEFT FOREARM  RADIOGRAPHS     HISTORY 61-year-old female with a history of a discolored left arm after  being found in the bathtub     FINDINGS: An AP supine chest radiograph was obtained. Comparison is made  to chest CT dated 05/04/2007. The lungs are decreased in volume but are  clear of consolidation. Bilateral perihilar atelectasis is noted. The  patient is slightly rotated to the right. No evidence for pneumothorax  or pleural effusion is appreciated.     AP and lateral radiographs of the left humerus were obtained and  demonstrate no evidence for a bony or soft tissue abnormality.     AP and lateral radiographs of the left forearm were obtained and  demonstrate no evidence for a bony abnormality. Overlying soft tissue  swelling is noted.       Impression:       1. Soft tissue swelling of the left forearm without evidence for  underlying bony abnormality.        2. Negative left humerus radiographs.        3. Mild bilateral perihilar  atelectasis.     This report was finalized on 3/9/2017 7:45 PM by Dr. Nakul Robins MD.       CT Chest Without Contrast [[id number]] Collected:  03/09/17 1748     Updated:  03/09/17 1948    Narrative:       EXAMINATIONS: CT OF THE CHEST WITHOUT CONTRAST AND CT OF THE ABDOMEN AND  PELVIS WITHOUT CONTRAST     HISTORY: 61-year-old female with a history of trauma, found in the  bathtub for 2 days and bruising of both breasts.     TECHNIQUE: Contiguous axial images were obtained through the chest,  abdomen and pelvis without IV or oral contrast.     COMPARISON: CT of the chest, 05/04/2007 and CT of the abdomen and  pelvis, 04/03/2007.     FINDINGS OF THE CHEST: There is focal atelectasis within the right upper  lobe and to a lesser degree within the right lower lobe. The left lung  is also under aerated. No areas of consolidation are otherwise  appreciated. There is no evidence for mediastinal adenopathy. No  axillary adenopathy is appreciated. The osseous structures of the thorax  have a normal appearance. Subcutaneous gas is noted in the left chest  wall.     FINDINGS OF THE ABDOMEN AND PELVIS: A 5.9 x 4.2 cm cyst is seen at the  superior pole of the right kidney. The adrenal glands, pancreas, spleen  and liver have a normal noncontrasted appearance. The left kidney has a  normal noncontrasted appearance. No abnormality of the bowel is  appreciated. Abundant gas is seen within the left hemiabdominal soft  tissues. The gas extends along the left lateral hemithorax/flank region  into the left lower quadrant soft tissues and left gluteal region.       Impression:       1. Extensive soft tissue gas is seen in the left flank that extends into  the left lower quadrant and left gluteal soft tissues. There is no  intraperitoneal extension but there is some gas within the left  abdominis rectus musculature. No definite penetrating injury is  appreciated. This could represent fasciitis. Clinical followup is  suggested.  2. There  is no evidence for a fracture or traumatic abnormality of the  chest, abdomen or pelvis.  3. 5.9 cm cyst at the superior pole of the right kidney.  4. Mild atelectasis within the right upper lobe and/or right lower lobe.  Pneumonia cannot be completely excluded.     This report was finalized on 3/9/2017 7:45 PM by Dr. Nakul Robins MD.       CT Abdomen Pelvis Without Contrast [[id number]] Collected:  03/09/17 1748     Updated:  03/09/17 1948    Narrative:       EXAMINATIONS: CT OF THE CHEST WITHOUT CONTRAST AND CT OF THE ABDOMEN AND  PELVIS WITHOUT CONTRAST     HISTORY: 61-year-old female with a history of trauma, found in the  bathtub for 2 days and bruising of both breasts.     TECHNIQUE: Contiguous axial images were obtained through the chest,  abdomen and pelvis without IV or oral contrast.     COMPARISON: CT of the chest, 05/04/2007 and CT of the abdomen and  pelvis, 04/03/2007.     FINDINGS OF THE CHEST: There is focal atelectasis within the right upper  lobe and to a lesser degree within the right lower lobe. The left lung  is also under aerated. No areas of consolidation are otherwise  appreciated. There is no evidence for mediastinal adenopathy. No  axillary adenopathy is appreciated. The osseous structures of the thorax  have a normal appearance. Subcutaneous gas is noted in the left chest  wall.     FINDINGS OF THE ABDOMEN AND PELVIS: A 5.9 x 4.2 cm cyst is seen at the  superior pole of the right kidney. The adrenal glands, pancreas, spleen  and liver have a normal noncontrasted appearance. The left kidney has a  normal noncontrasted appearance. No abnormality of the bowel is  appreciated. Abundant gas is seen within the left hemiabdominal soft  tissues. The gas extends along the left lateral hemithorax/flank region  into the left lower quadrant soft tissues and left gluteal region.       Impression:       1. Extensive soft tissue gas is seen in the left flank that extends into  the left lower quadrant  and left gluteal soft tissues. There is no  intraperitoneal extension but there is some gas within the left  abdominis rectus musculature. No definite penetrating injury is  appreciated. This could represent fasciitis. Clinical followup is  suggested.  2. There is no evidence for a fracture or traumatic abnormality of the  chest, abdomen or pelvis.  3. 5.9 cm cyst at the superior pole of the right kidney.  4. Mild atelectasis within the right upper lobe and/or right lower lobe.  Pneumonia cannot be completely excluded.     This report was finalized on 3/9/2017 7:45 PM by Dr. Nakul Robins MD.       XR Forearm 2 View Left [[id number]] Collected:  03/09/17 1745     Updated:  03/09/17 1948    Narrative:       EXAMINATIONS: SINGLE VIEW CHEST, LEFT HUMERUS AND 2 VIEWS LEFT FOREARM  RADIOGRAPHS     HISTORY 61-year-old female with a history of a discolored left arm after  being found in the bathtub     FINDINGS: An AP supine chest radiograph was obtained. Comparison is made  to chest CT dated 05/04/2007. The lungs are decreased in volume but are  clear of consolidation. Bilateral perihilar atelectasis is noted. The  patient is slightly rotated to the right. No evidence for pneumothorax  or pleural effusion is appreciated.     AP and lateral radiographs of the left humerus were obtained and  demonstrate no evidence for a bony or soft tissue abnormality.     AP and lateral radiographs of the left forearm were obtained and  demonstrate no evidence for a bony abnormality. Overlying soft tissue  swelling is noted.       Impression:       1. Soft tissue swelling of the left forearm without evidence for  underlying bony abnormality.        2. Negative left humerus radiographs.        3. Mild bilateral perihilar atelectasis.     This report was finalized on 3/9/2017 7:45 PM by Dr. Nakul Robins MD.       XR Humerus Left [[id number]] Collected:  03/09/17 1745     Updated:  03/09/17 1948    Narrative:       EXAMINATIONS: SINGLE VIEW  CHEST, LEFT HUMERUS AND 2 VIEWS LEFT FOREARM  RADIOGRAPHS     HISTORY 61-year-old female with a history of a discolored left arm after  being found in the bathtub     FINDINGS: An AP supine chest radiograph was obtained. Comparison is made  to chest CT dated 05/04/2007. The lungs are decreased in volume but are  clear of consolidation. Bilateral perihilar atelectasis is noted. The  patient is slightly rotated to the right. No evidence for pneumothorax  or pleural effusion is appreciated.     AP and lateral radiographs of the left humerus were obtained and  demonstrate no evidence for a bony or soft tissue abnormality.     AP and lateral radiographs of the left forearm were obtained and  demonstrate no evidence for a bony abnormality. Overlying soft tissue  swelling is noted.       Impression:       1. Soft tissue swelling of the left forearm without evidence for  underlying bony abnormality.        2. Negative left humerus radiographs.        3. Mild bilateral perihilar atelectasis.     This report was finalized on 3/9/2017 7:45 PM by Dr. Nakul Robins MD.             I reviewed the patient's new clinical results.  I personally viewed and interpreted the patient's ct: Necrotizing fasciitis especially on the left side        Bedside ultrasound was done(pictures were saved and will be scanned into Epic) and it showed significant fluctuation in the diameter of the central vein with respiration suggestive of relatively low central venous pressure.  This suggested the patient should benefit from further IV fluid challenge despite the lower extremity edema and third spacing         Medication Review:     enoxaparin 30 mg Subcutaneous Daily   [START ON 3/10/2017] piperacillin-tazobactam 3.375 g Intravenous Q8H   sodium chloride 1,000 mL Intravenous Once   Vancomycin Pharmacy Intermittent Dosing  Does not apply Daily         Pharmacy to dose vancomycin    sodium bicarbonate drip (greater than 75 mEq/bag) 200 mL/hr        ASSESSMENT:   Severe rhabdomyolysis  Soft tissue infection with exercising fasciitis debridement hopefully tomorrow per surgical note  Acute renal failure  Third spacing with edema however with evidence of low central venous pressure  Oliguria, almost anuria  Lactic acidosis  Sepsis secondary to soft tissue infection    PLAN:  Continue with the antibiotic with vancomycin and Zosyn  We will get 2-D echocardiogram to better assess the cardiac function given the degree of the edema especially that she is planned to have surgery possibly tomorrow  Continue with the IV fluid despite the edema and we'll give 1 more liter of normal saline bolus over 2 hours and continue with the IV bicarbonate drip and follow-up on the urine output  Awaiting further input from the nephrology service    Discussed with the family at the bedside and summarized the clinical situation  Notes in the chart were reviewed and pertinent information were summarized in the notes above  Lab and films were independently reviewed      Fernando Horn MD  03/09/17  7:59 PM      Time: Critical care 39 min    EMR Dragon/Transcription disclaimer:   Much of this encounter note is an electronic transcription/translation of spoken language to printed text. The electronic translation of spoken language may permit erroneous, or at times, nonsensical words or phrases to be inadvertently transcribed; Although I have reviewed the note for such errors, some may still exist.
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
## 2017-03-10 NOTE — PROGRESS NOTES
Chief complaint: Fall with pressure wound left groin    Subjective:  Patient denies any significant pain this morning.  She is more awake and able to converse little bit.  She says she feels thirsty.  Her urine output has been very poor overnight.    Objective:  Patient has been afebrile, heart rate generally in the 90s, blood pressure 90s to 100s over 50s to 60s  Urine output only 75 cc overnight  Gen.: No acute distress, asleep but able to be awakened and with some difficulty able to answer questions  Neck: Supple without lymphadenopathy or thyromegaly  Abdomen: Soft and benign, minimally tender,and cellulitis on abdominal wall  Skin exam: In the left groin there is an area of macerated and partially necrotic skin in the fold of the groin.  There is some.  Once drainage from a few perforated areas in the skin.  There is evidence for subcutaneous emphysema.    Laboratory data is reviewed.  Acidosis not significantly improved, creatinine slightly worse this morning.  White blood cell count also slightly elevated compared to yesterday at about 20,000.  Hemoglobin is okay this morning.    Assessment and plan:  1.  Fall with pressure sore and necrotic wound to left groin.  I do not believe this represents a necrotizing fasciitis but we are going to need to explore more thoroughly in order to rule out out.  Although she hasn't improved as much is a moderate of like today, I think that we will need to take her later today for further evaluation of this area to ensure that it is not the source of her ongoing renal problems.  I discussed the risks and benefits of the procedure with the patient and she understands these as near as I can tell.  She is going to require dressing changes postoperatively.  Continue antibiotics.    Andrea Fried MD  General and Endoscopic Surgery  [facility], operated by [facility]    [address], [address]  [place], [place], [address]  P: [phone]  F: [phone]

## 2017-03-10 NOTE — CONSULTS
Subjective:     Chief Complaint   Patient presents with   • Cold Exposure   • Fall          Luca Amezcua is a 61 y.o. female who is referred by Aguilar Jenkins MD for evaluation.  This unfortunate lady lives by herself and was apparently found down earlier today.  It appears that she had been essentially trapped lying over the edge of her shower between the shower and the commode with her hip pinned against the track of the shower door.  She had been unable to get up and was found with water arising within the tub and was largely unresponsive and very cold.  She apparently complained of abdominal pain and arm pain and hip pain, but on my exam was really not able to give me any meaningful discussion.  The history that I obtained is from the patient's family, from the emergency room physician and from the chart.  Apparently she had been living relatively independently although her family says she had been declining for a long period of time.  Precisely what caused her to fall and be wedged in this position is not clear to me.  She is acutely ill and in acute renal failure, acidotic and hypothermic at this time.      The following portions of the patient's history were reviewed and updated as appropriate: allergies, current medications, past family history, past medical history, past social history and past surgical history.    Past medical history: Positive for acid reflux disease and hypertension and obesity    Past surgical history: Patient's family tells me they are not aware of any surgical history that she has.    Current Facility-Administered Medications:   •  acetaminophen (TYLENOL) tablet 650 mg, 650 mg, Oral, Q4H PRN **OR** acetaminophen (TYLENOL) suppository 650 mg, 650 mg, Rectal, Q4H PRN, Aguilar Jenkins MD  •  aluminum-magnesium hydroxide-simethicone (MAALOX/MYLANTA) suspension 30 mL, 30 mL, Oral, Q6H PRN, Aguilar Jenkins MD  •  bisacodyl (DULCOLAX) EC tablet 5 mg, 5 mg, Oral, Daily  PRN, Aguilar Jenkins MD  •  bisacodyl (DULCOLAX) suppository 10 mg, 10 mg, Rectal, Daily PRN, Aguilar Jenkins MD  •  enoxaparin (LOVENOX) syringe 30 mg, 30 mg, Subcutaneous, Daily, Aguilar Jenkins MD  •  ipratropium-albuterol (DUO-NEB) nebulizer solution 3 mL, 3 mL, Nebulization, Q6H PRN, Aguilar Jenkins MD  •  ondansetron (ZOFRAN) tablet 4 mg, 4 mg, Oral, Q6H PRN **OR** ondansetron ODT (ZOFRAN-ODT) disintegrating tablet 4 mg, 4 mg, Oral, Q6H PRN **OR** ondansetron (ZOFRAN) injection 4 mg, 4 mg, Intravenous, Q6H PRN, Aguilar Jenkins MD  •  Pharmacy to dose vancomycin, , Does not apply, Continuous PRN, Aguilar Jenkins MD  •  [START ON 3/10/2017] piperacillin-tazobactam (ZOSYN) 3.375 g in dextrose 50 mL IVPB (premix), 3.375 g, Intravenous, Q8H, Aguilar Jenkins MD  •  sodium bicarbonate 150 mEq in dextrose (D5W) 5 % 1,000 mL infusion (greater than 75 mEq), 200 mL/hr, Intravenous, Continuous, Gamal Olguin MD  •  sodium chloride 0.9 % flush 1-10 mL, 1-10 mL, Intravenous, PRN, Aguilar Jenkins MD  •  Insert peripheral IV, , , Once **AND** sodium chloride 0.9 % flush 10 mL, 10 mL, Intravenous, PRN, Pito Fernandes MD  •  sodium chloride 0.9 % infusion, 150 mL/hr, Intravenous, Continuous, Aguilar Jenkins MD  •  Vancomycin Pharmacy Intermittent Dosing, , Does not apply, Daily, Aguilar Jenkins MD    No Known Allergies    Family history: Positive for hypertension and coronary artery disease    Social History     Social History   • Marital status: Single     Spouse name: N/A   • Number of children: N/A   • Years of education: N/A     Occupational History   • Not on file.     Social History Main Topics   • Smoking status: Not on file   • Smokeless tobacco: Not on file   • Alcohol use Not on file   • Drug use: Not on file   • Sexual activity: Not on file     Other Topics Concern   • Not on file     Social History Narrative    patient lives alone, but her family says that they have a  camera in her living room for which they will watch her and can communicate with her.      Review of Systems  Review of systems not obtained due to patient factors.    Although she is awake, she is not able to give any meaningful answers and has recently been administered narcotics and has a diminished level of consciousness.  Objective:     Vitals:    03/09/17 1837   BP: 110/96   Pulse: 94   Resp:    Temp:    SpO2: 100%       PHYSICAL EXAM:  - Constitutional: Obese, chronically ill lady awake and with her eyes open but does not respond to my questions.  Height 64 inches  Weight 250 pounds    - Eyes: Conjunctiva normal, sclera nonicteric  - ENMT: Difficult to assess hearing although the patient does move her eyes in response to sounds, oral mucosa moist, dentition is poor  - Neck: Supple, no palpable mass,  trachea midline  - Respiratory: Diminished breath sounds bilaterally, no clear wheezes heard, normal inspiratory effort  - Cardiovascular: Regular rate, no peripheral edema, no jugular venous distention  - Gastrointestinal: Soft, no grimacing with deep palpation, no palpable mass, no hepatosplenomegaly, negative for hernia, bowel sounds are diminished  - Skin: The patient's left arm and shoulder are erythematous but without signs of abscess or warmth.  Over her left groin there is a necrotic wound with several open areas consistent with the history of prolonged pressure ulcer.  There is no fluctuance to the tissue.  There is no drainage.  There is no significant erythema overlying the area.  There is no significant tenderness noted with palpation.  - Musculoskeletal: Normal bilateral muscle bulk, no gross deformity  - Psychiatric: Awake with eyes open but unable to respond to my questions.     Laboratory  Lab Results   Component Value Date    WBC 17.60 (H) 03/09/2017    RBC 6.26 (H) 03/09/2017    HGB 17.0 (H) 03/09/2017    HCT 52.4 (H) 03/09/2017     03/09/2017    chemistries reveal evidence for acute  renal failure with a creatinine of about 2.3.  She is acidotic with a bicarbonate of around 16.  Arterial blood gas demonstrates a pH of around 7.2.  Consistent with metabolic acidosis.      No results found for: AMYLASE  No results found for: LIPASE     CT scan of the abdomen and pelvis is reviewed by me.  There is an obvious area of extensive subcutaneous air noted in the patient's left groin and extending into the left lower quadrant and radiating around posteriorly towards the left flank and buttock.  There is no evidence for accumulation of fluid or abscess.  There are no other obvious signs of thickening of the musculature or fascia to suggest necrotizing fasciitis.    Assessment:     1.  Necrotizing soft tissue infection of the left groin with associated subcutaneous air.  I do not see any clear evidence of necrotizing fasciitis at this time.  2.  Hypothermia  3.  Acute renal failure  4.  Elevated transaminases  5.  Dehydration     Plan:     This unfortunate patient is going to require surgical debridement, but I think for the time being we will be better off to let her warm up and get rehydrated and try to improve her acid base status and renal function.  She has been started on antibiotics and is going to require the same.  She is looking better tomorrow that I will try to get her debrided.  I explained to the family that an extensive debridement may be necessary although we see on the outside is not quite as impressive.  I did explain to them that it is possible that she could experience a worsening of her status, but at this time it is my judgment that she would do worse going straight to the operating room right now than she would do if we can get her tank top and a little better shape before we take her for her debridement.    Andrea Fried MD  General and Endoscopic Surgery  [facility]    [address] [place] [address], [address]  [place], [place], [address]  P: [phone]  F:  [phone]

## 2017-03-10 NOTE — PROGRESS NOTES
"[facility]         Dr Branden Michelle   LOS: 1 day   Patient Care Team:  No Known Provider as PCP - General    Chief Complaint:  Follow-up on rhabdomyolysis, renal failure, sepsis    Interval History:   She reported mild pain in her groin area.  Otherwise, she does not have any complaints.  She is on room air    REVIEW OF SYSTEMS:   CARDIOVASCULAR: No chest pain, chest pressure or chest discomfort. No palpitations. ++ edema.   RESPIRATORY: No shortness of breath, cough or sputum.   GASTROINTESTINAL: No anorexia, nausea, vomiting or diarrhea. No abdominal pain or blood.   HEMATOLOGIC: No bleeding or bruising.     Ventilator/Non-Invasive Ventilation Settings     None          Objective   Vital Signs  Temp:  [95.3 °F (35.2 °C)-96.8 °F (36 °C)] 96.8 °F (36 °C)  Heart Rate:  [] 100  Resp:  [18-25] 18  BP: ()/() 104/56    Intake/Output Summary (Last 24 hours) at 03/10/17 1250  Last data filed at 03/10/17 0700   Gross per 24 hour   Intake   4673 ml   Output     75 ml   Net   4598 ml     Flowsheet Rows         First Filed Value    Admission Height  64\" (162.6 cm) Documented at 03/09/2017 1426    Admission Weight  250 lb (113 kg) Documented at 03/09/2017 1426          Physical Exam:   General Appearance:    Alert, cooperative, in no acute distress   Lungs:     Clear to auscultation,respirations regular, even and                  unlabored    Heart:    Regular rhythm and normal rate, normal S1 and S2, no            murmur, no gallop, no rub, no click   Chest Wall:    right breast skin appears to be slightly indurated and edematous.  No redness or discharge    Abdomen:     obese.  Soft.  No tenderness    Neuro:   Conscious, alert, oriented x3   Extremities:   Moves all extremities well, +++ edema in legs and arms, no cyanosis, no             Redness          Results Review:          Results from last 7 days  Lab Units 03/10/17  0441 03/09/17  2238 03/09/17  1644   SODIUM mmol/L 146*  143 " 145 142   POTASSIUM mmol/L 4.5  4.4 4.4 4.0   CHLORIDE mmol/L 100  100 104 102   TOTAL CO2 mmol/L 19.4*  23.1 18.4* 15.9*   BUN mg/dL 37*  37* 33* 32*   CREATININE mg/dL 2.70*  2.70* 2.22* 2.24*   GLUCOSE mg/dL 227*  234* 150* 121*   CALCIUM mg/dL 7.4*  7.4* 7.9* 8.5*       Results from last 7 days  Lab Units 03/10/17  0441 03/09/17  1644   CK TOTAL U/L >12375* >11773*   TROPONIN T ng/mL  --  0.011       Results from last 7 days  Lab Units 03/10/17  0441 03/09/17  1524   WBC 10*3/mm3 20.89* 17.60*   HEMOGLOBIN g/dL 13.4 17.0*   HEMATOCRIT % 43.0 52.4*   PLATELETS 10*3/mm3 314 323       Results from last 7 days  Lab Units 03/09/17  1524   INR  1.18*   APTT seconds 26.5         @LABRCNT(bnp)@  I reviewed the patient's new clinical results.  I personally viewed and interpreted the patient's CXR        Medication Review:     enoxaparin 30 mg Subcutaneous Daily   insulin aspart 0-9 Units Subcutaneous Q4H   pantoprazole 40 mg Oral Q AM   piperacillin-tazobactam 3.375 g Intravenous Q12H   Vancomycin Pharmacy Intermittent Dosing  Does not apply Daily         Pharmacy to dose vancomycin     sodium bicarbonate drip (greater than 75 mEq/bag) 125 mL/hr Last Rate: 125 mL/hr (03/10/17 0719)   sodium chloride 9 mL/hr Last Rate: 9 mL/hr (03/10/17 0208)       Assessment/Plan     1) Severe rhabdomyolysis  2) Left groin necrotizing fasciitis  3) Oliguric acute renal failure, 2ary to #1 and dehydration  4) Third spacing  5) Lactic acidosis  6) Sepsis, 2arty to #2  7) Metabolic acidosis   8) Morbid obesity  9) Protein calorie malnutrition (Alb=2.4)    - Sodium bicarbonate per nephrology  - debridement/exploration fasciitis by surgery today. Can be transferred to floor tomorrow if stable after OR  - Continue antibiotic treatment with Zosyn and vancomycin, we will consider de-escalation later on especially after obtaining culture  - Will address Nutrition status after surgery,. Remains NPO for now.      Branden Michelle,  MD  03/10/17  12:50 PM

## 2017-03-10 NOTE — OP NOTE
Operative Note :   MD Gustavo Leonetim Goldie  1955    Procedure Date: 03/10/17    Pre-op Diagnosis:  · Necrotizing soft tissue infection of bilateral groin    Post-op Diagnosis:  · Same    Procedure:   · Radical debridement of bilateral necrotic groin wounds including skin and subcutaneous tissue    Surgeon: Andrea Fried MD    Assistant: Sera BALTAZAR    Anesthesia:  General (general endotracheal tube)    Indications:  · See history and physical for details, but essentially this unfortunate lady was trapped lying over the edge of her bathtub for 2 days and has essentially pressure sores on both of her groins    Findings:   · Necrotizing soft tissue infection without evidence for necrotizing fasciitis or necrotic muscle    Recommendations:   · Begin dressing changes tomorrow.  · Hopefully we can switch to a wound VAC in the next couple of days if the wound looks clean    Description of procedure:    After obtaining informed consent, patient was brought to the operating room and was placed under general anesthetic.  Her bilateral groin abdomen and thighs were sterilely prepped and draped.  I started on the patient's left side and I used a #10 blade to excise the obviously necrotic skin.  I continued this down through the necrotic fat until I got back to what looked like healthy bleeding fat again using pickups and knife.  I used electrocautery to debride away slow mall amount of additional subcutaneous fat and then I got down onto the inguinal ligament and the external oblique fascia and this all appeared to be healthy.  I did open the external oblique fascia just a small amount and the underlying muscle was also healthy.  I irrigated out this wound and then packed it with a Betadine soaked Kerlix.  I turned my attention then to the patient's right groin and again used #10 blade and pickups to debride away the grossly necrotic skin.  Electrocautery was then used to debride away the grossly  necrotic fat and again the underlying fascia and muscle was healthy on this right side.  I used the cautery to again gain hemostasis and then irrigated the wound.  I then again packed the wound with a Betadine soaked Kerlix.    Andrea Fried MD  General and Endoscopic Surgery  [facility]    [address], [address]  [place], [place], [address]  P: [phone]  F: [phone]

## 2017-03-10 NOTE — ANESTHESIA PROCEDURE NOTES
Airway  Urgency: elective    Airway not difficult    General Information and Staff    Patient location during procedure: OR  Anesthesiologist: CALLUM LEWIS    Indications and Patient Condition  Indications for airway management: airway protection    Preoxygenated: yes  Mask difficulty assessment: 1 - vent by mask    Final Airway Details  Final airway type: endotracheal airway      Successful airway: ETT  Cuffed: yes   Successful intubation technique: video laryngoscopy  Endotracheal tube insertion site: oral  Blade: Gordon  Blade size: #3  ETT size: 7.0 mm  Cormack-Lehane Classification: grade IIb - view of arytenoids or posterior of glottis only  Placement verified by: chest auscultation and capnometry   Measured from: lips  Number of attempts at approach: 1

## 2017-03-10 NOTE — PLAN OF CARE
Problem: Patient Care Overview (Adult)  Goal: Plan of Care Review  Outcome: Ongoing (interventions implemented as appropriate)    03/10/17 0551   Coping/Psychosocial Response Interventions   Plan Of Care Reviewed With patient;family   Patient Care Overview   Progress no change   Outcome Evaluation   Outcome Summary/Follow up Plan Hemodynamically stable, but labs (lactic and creatinine) worsening overnight. Very little urine output. Pain managed with fentanyl. Pt became mildly febrile. Pulmonology , renal and surgery following. Plan for likely debridement today.       Goal: Adult Individualization and Mutuality  Outcome: Ongoing (interventions implemented as appropriate)  Goal: Discharge Needs Assessment  Outcome: Ongoing (interventions implemented as appropriate)    Problem: Sepsis (Adult)  Goal: Signs and Symptoms of Listed Potential Problems Will be Absent or Manageable (Sepsis)  Outcome: Ongoing (interventions implemented as appropriate)    Problem: Wound, Traumatic, Nonburn (Adult)  Goal: Signs and Symptoms of Listed Potential Problems Will be Absent or Manageable (Wound, Traumatic, Nonburn)  Outcome: Ongoing (interventions implemented as appropriate)    Problem: Fall Risk (Adult)  Goal: Identify Related Risk Factors and Signs and Symptoms  Outcome: Ongoing (interventions implemented as appropriate)  Goal: Absence of Falls  Outcome: Ongoing (interventions implemented as appropriate)

## 2017-03-10 NOTE — SIGNIFICANT NOTE
03/10/17 1035   Rehab Treatment   Discipline speech language pathologist   Rehab Evaluation   Evaluation Not Performed other (see comments)  (pt is NPO for surgery.  Will attempt to see tomorow. )   Recommendations   SLP - Next Appointment (follow up tomorrow. )

## 2017-03-10 NOTE — PROGRESS NOTES
"Pharmacokinetic Consult - Vancomycin Dosing (Follow-up Note)  Patient: Luca Amezcua  : 1955  MRN: [id number]  Admit date: 3/9/2017  2:33 PM    Day 2  Consult for Dr Jenkins  Treating: SSTI  Goal trough: 15-20 mcg/ml    Relevant clinical data and objective history reviewed:  61 y.o. female 64\" (162.6 cm) 227 lb (103 kg)  Pressure wound on abdomen/groin after being found down. Surgery is consulted and plans to take to OR today for I&D of wound.    Past Medical History   Diagnosis Date   • Bleeding gastrointestinal    • Head trauma      CREATININE   Date Value Ref Range Status   03/10/2017 3.25 (H) 0.57 - 1.00 mg/dL Final   03/10/2017 2.70 (H) 0.57 - 1.00 mg/dL Final   03/10/2017 2.70 (H) 0.57 - 1.00 mg/dL Final     BUN   Date Value Ref Range Status   03/10/2017 41 (H) 8 - 23 mg/dL Final     Estimated Creatinine Clearance: 21 mL/min (by C-G formula based on Cr of 3.25).    Lab Results   Component Value Date    WBC 20.89 (H) 03/10/2017    WBC 17.60 (H) 2017     Temp Readings from Last 3 Encounters:   17 96.8 °F (36 °C) (Rectal)     Baseline cultures/labs/radiology:  3/9 blood cx: NG<24h    IV Anti-Infectives     Ordered     Dose/Rate Route Frequency Start Stop    03/10/17 0720  piperacillin-tazobactam (ZOSYN) 3.375 g in dextrose 50 mL IVPB (premix)     Ordering Provider:  Danilo Edgar MD    3.375 g  over 4 Hours Intravenous Every 12 Hours 03/10/17 1407      17 1817  Vancomycin Pharmacy Intermittent Dosing     Ordering Provider:  Aguilar Jenkins MD     Does not apply Daily 17 18117 180  Pharmacy to dose vancomycin     Ordering Provider:  Aguilar Jenkins MD     Does not apply Continuous PRN 17 1806      17 1738  vancomycin 2250 mg/500 mL 0.9% NS IVPB ([facility])     Ordering Provider:  Gamal Olguin MD    20 mg/kg × 113 kg Intravenous Once 17 1800 17 1759    17 1758  clindamycin (CLEOCIN) 600 mg in dextrose 5% 50 mL IVPB " (premix)     Ordering Provider:  Gamal Olguin MD    600 mg Intravenous Once 03/09/17 1800 03/09/17 1810    03/09/17 1612  piperacillin-tazobactam (ZOSYN) 4.5 g in dextrose 100 mL IVPB (premix)     Ordering Provider:  Gamal Olguin MD    4.5 g Intravenous Once 03/09/17 1614 03/09/17 1759         Vancomycin dosing history:   3/9: 2250mg IV x1 @ 1759  3/10 random: 24.6 mcg/ml    Assessment/Plan  1. Pt's UOP remains extremely low. Scr increased. Continue to dose vancomycin intermittently for now. Will give small dose of 500mg x1 later today and recheck random level in AM tomorrow to evaluate. Expect ~10% non-renal clearance of vancomycin.  2. Zosyn dose has been appropriately reduced to 3.375gm IV q12h for estimated Crcl<20 ml/min.   Pharmacy will continue to follow daily while on vancomycin and adjust as needed.     Thank you,  Ada Lee, PharmD, BCPS  03/10/17 1:54 PM

## 2017-03-11 LAB
ALBUMIN SERPL-MCNC: 2.5 G/DL (ref 3.5–5.2)
ALBUMIN/GLOB SERPL: 0.9 G/DL
ALP SERPL-CCNC: 103 U/L (ref 39–117)
ALT SERPL W P-5'-P-CCNC: 109 U/L (ref 1–33)
ANION GAP SERPL CALCULATED.3IONS-SCNC: 20.9 MMOL/L
AST SERPL-CCNC: 205 U/L (ref 1–32)
BASOPHILS # BLD AUTO: 0.01 10*3/MM3 (ref 0–0.2)
BASOPHILS NFR BLD AUTO: 0.1 % (ref 0–1.5)
BILIRUB SERPL-MCNC: 0.4 MG/DL (ref 0.1–1.2)
BUN BLD-MCNC: 47 MG/DL (ref 8–23)
BUN/CREAT SERPL: 11.2 (ref 7–25)
CALCIUM SPEC-SCNC: 5.9 MG/DL (ref 8.6–10.5)
CHLORIDE SERPL-SCNC: 97 MMOL/L (ref 98–107)
CK SERPL-CCNC: ABNORMAL U/L (ref 20–180)
CK SERPL-CCNC: ABNORMAL U/L (ref 20–180)
CO2 SERPL-SCNC: 25.1 MMOL/L (ref 22–29)
CREAT BLD-MCNC: 4.21 MG/DL (ref 0.57–1)
D-LACTATE SERPL-SCNC: 1.1 MMOL/L (ref 0.5–2)
D-LACTATE SERPL-SCNC: 1.3 MMOL/L (ref 0.5–2)
D-LACTATE SERPL-SCNC: 1.4 MMOL/L (ref 0.5–2)
DEPRECATED RDW RBC AUTO: 49.8 FL (ref 37–54)
EOSINOPHIL # BLD AUTO: 0 10*3/MM3 (ref 0–0.7)
EOSINOPHIL NFR BLD AUTO: 0 % (ref 0.3–6.2)
ERYTHROCYTE [DISTWIDTH] IN BLOOD BY AUTOMATED COUNT: 16.1 % (ref 11.7–13)
GFR SERPL CREATININE-BSD FRML MDRD: 11 ML/MIN/1.73
GLOBULIN UR ELPH-MCNC: 2.7 GM/DL
GLUCOSE BLD-MCNC: 180 MG/DL (ref 65–99)
GLUCOSE BLDC GLUCOMTR-MCNC: 132 MG/DL (ref 70–130)
GLUCOSE BLDC GLUCOMTR-MCNC: 133 MG/DL (ref 70–130)
GLUCOSE BLDC GLUCOMTR-MCNC: 133 MG/DL (ref 70–130)
GLUCOSE BLDC GLUCOMTR-MCNC: 153 MG/DL (ref 70–130)
GLUCOSE BLDC GLUCOMTR-MCNC: 158 MG/DL (ref 70–130)
HCT VFR BLD AUTO: 34.1 % (ref 35.6–45.5)
HGB BLD-MCNC: 10.6 G/DL (ref 11.9–15.5)
IMM GRANULOCYTES # BLD: 0.07 10*3/MM3 (ref 0–0.03)
IMM GRANULOCYTES NFR BLD: 0.4 % (ref 0–0.5)
LYMPHOCYTES # BLD AUTO: 1.15 10*3/MM3 (ref 0.9–4.8)
LYMPHOCYTES NFR BLD AUTO: 6.6 % (ref 19.6–45.3)
MAGNESIUM SERPL-MCNC: 1.8 MG/DL (ref 1.6–2.4)
MCH RBC QN AUTO: 26.4 PG (ref 26.9–32)
MCHC RBC AUTO-ENTMCNC: 31.1 G/DL (ref 32.4–36.3)
MCV RBC AUTO: 85 FL (ref 80.5–98.2)
MONOCYTES # BLD AUTO: 0.89 10*3/MM3 (ref 0.2–1.2)
MONOCYTES NFR BLD AUTO: 5.1 % (ref 5–12)
NEUTROPHILS # BLD AUTO: 15.3 10*3/MM3 (ref 1.9–8.1)
NEUTROPHILS NFR BLD AUTO: 87.8 % (ref 42.7–76)
PHOSPHATE SERPL-MCNC: 5.4 MG/DL (ref 2.5–4.5)
PLATELET # BLD AUTO: 288 10*3/MM3 (ref 140–500)
PMV BLD AUTO: 10.4 FL (ref 6–12)
POTASSIUM BLD-SCNC: 4 MMOL/L (ref 3.5–5.2)
POTASSIUM BLD-SCNC: 4.1 MMOL/L (ref 3.5–5.2)
PROT SERPL-MCNC: 5.2 G/DL (ref 6–8.5)
RBC # BLD AUTO: 4.01 10*6/MM3 (ref 3.9–5.2)
SODIUM BLD-SCNC: 143 MMOL/L (ref 136–145)
URATE SERPL-MCNC: 6.7 MG/DL (ref 2.4–5.7)
VANCOMYCIN SERPL-MCNC: 27.6 MCG/ML (ref 5–40)
WBC NRBC COR # BLD: 17.42 10*3/MM3 (ref 4.5–10.7)

## 2017-03-11 PROCEDURE — 25010000002 PIPERACILLIN SOD-TAZOBACTAM PER 1 G: Performed by: INTERNAL MEDICINE

## 2017-03-11 PROCEDURE — 83605 ASSAY OF LACTIC ACID: CPT | Performed by: INTERNAL MEDICINE

## 2017-03-11 PROCEDURE — 80053 COMPREHEN METABOLIC PANEL: CPT | Performed by: INTERNAL MEDICINE

## 2017-03-11 PROCEDURE — 25010000002 PROPOFOL 1000 MG/ML EMULSION: Performed by: INTERNAL MEDICINE

## 2017-03-11 PROCEDURE — 83735 ASSAY OF MAGNESIUM: CPT | Performed by: INTERNAL MEDICINE

## 2017-03-11 PROCEDURE — 85025 COMPLETE CBC W/AUTO DIFF WBC: CPT | Performed by: INTERNAL MEDICINE

## 2017-03-11 PROCEDURE — 80202 ASSAY OF VANCOMYCIN: CPT | Performed by: INTERNAL MEDICINE

## 2017-03-11 PROCEDURE — 94003 VENT MGMT INPAT SUBQ DAY: CPT

## 2017-03-11 PROCEDURE — 94799 UNLISTED PULMONARY SVC/PX: CPT

## 2017-03-11 PROCEDURE — 84132 ASSAY OF SERUM POTASSIUM: CPT | Performed by: INTERNAL MEDICINE

## 2017-03-11 PROCEDURE — 63710000001 INSULIN ASPART PER 5 UNITS: Performed by: INTERNAL MEDICINE

## 2017-03-11 PROCEDURE — 84100 ASSAY OF PHOSPHORUS: CPT | Performed by: INTERNAL MEDICINE

## 2017-03-11 PROCEDURE — 25010000002 FENTANYL CITRATE (PF) 100 MCG/2ML SOLUTION: Performed by: INTERNAL MEDICINE

## 2017-03-11 PROCEDURE — 82550 ASSAY OF CK (CPK): CPT | Performed by: INTERNAL MEDICINE

## 2017-03-11 PROCEDURE — 25010000002 ENOXAPARIN PER 10 MG: Performed by: INTERNAL MEDICINE

## 2017-03-11 PROCEDURE — 94760 N-INVAS EAR/PLS OXIMETRY 1: CPT

## 2017-03-11 PROCEDURE — 84550 ASSAY OF BLOOD/URIC ACID: CPT | Performed by: INTERNAL MEDICINE

## 2017-03-11 PROCEDURE — 99024 POSTOP FOLLOW-UP VISIT: CPT | Performed by: SURGERY

## 2017-03-11 PROCEDURE — 82962 GLUCOSE BLOOD TEST: CPT

## 2017-03-11 RX ADMIN — TAZOBACTAM SODIUM AND PIPERACILLIN SODIUM 3.38 G: 375; 3 INJECTION, SOLUTION INTRAVENOUS at 01:59

## 2017-03-11 RX ADMIN — TAZOBACTAM SODIUM AND PIPERACILLIN SODIUM 3.38 G: 375; 3 INJECTION, SOLUTION INTRAVENOUS at 13:24

## 2017-03-11 RX ADMIN — FENTANYL CITRATE 50 MCG: 50 INJECTION INTRAMUSCULAR; INTRAVENOUS at 19:38

## 2017-03-11 RX ADMIN — FENTANYL CITRATE 50 MCG: 50 INJECTION INTRAMUSCULAR; INTRAVENOUS at 03:30

## 2017-03-11 RX ADMIN — PROPOFOL 20 MCG/KG/MIN: 10 INJECTION, EMULSION INTRAVENOUS at 15:13

## 2017-03-11 RX ADMIN — PROPOFOL 25 MCG/KG/MIN: 10 INJECTION, EMULSION INTRAVENOUS at 19:43

## 2017-03-11 RX ADMIN — Medication 0.08 MCG/KG/MIN: at 08:14

## 2017-03-11 RX ADMIN — FENTANYL CITRATE 50 MCG: 50 INJECTION INTRAMUSCULAR; INTRAVENOUS at 01:55

## 2017-03-11 RX ADMIN — INSULIN ASPART 2 UNITS: 100 INJECTION, SOLUTION INTRAVENOUS; SUBCUTANEOUS at 04:58

## 2017-03-11 RX ADMIN — PROPOFOL 15 MCG/KG/MIN: 10 INJECTION, EMULSION INTRAVENOUS at 05:55

## 2017-03-11 RX ADMIN — ENOXAPARIN SODIUM 30 MG: 30 INJECTION SUBCUTANEOUS at 08:14

## 2017-03-11 RX ADMIN — SODIUM CHLORIDE 125 ML/HR: 9 INJECTION, SOLUTION INTRAVENOUS at 06:11

## 2017-03-11 RX ADMIN — SODIUM CHLORIDE 150 ML/HR: 9 INJECTION, SOLUTION INTRAVENOUS at 13:22

## 2017-03-11 RX ADMIN — FENTANYL CITRATE 50 MCG: 50 INJECTION INTRAMUSCULAR; INTRAVENOUS at 23:32

## 2017-03-11 RX ADMIN — FENTANYL CITRATE 50 MCG: 50 INJECTION INTRAMUSCULAR; INTRAVENOUS at 10:02

## 2017-03-11 RX ADMIN — SODIUM CHLORIDE 150 ML/HR: 9 INJECTION, SOLUTION INTRAVENOUS at 19:43

## 2017-03-11 RX ADMIN — FENTANYL CITRATE 50 MCG: 50 INJECTION INTRAMUSCULAR; INTRAVENOUS at 07:27

## 2017-03-11 NOTE — PLAN OF CARE
Problem: Sepsis (Adult)  Goal: Signs and Symptoms of Listed Potential Problems Will be Absent or Manageable (Sepsis)  Outcome: Ongoing (interventions implemented as appropriate)    Problem: SAFETY - NON-VIOLENT RESTRAINT  Goal: Remains free of injury from restraints (Non-Violent Restraint)  Outcome: Ongoing (interventions implemented as appropriate)  Goal: Free from restraint(s) (Non-Violent Restraint)  Outcome: Ongoing (interventions implemented as appropriate)    Problem: Mechanical Ventilation, Invasive (Adult)  Goal: Signs and Symptoms of Listed Potential Problems Will be Absent or Manageable (Mechanical Ventilation, Invasive)  Outcome: Ongoing (interventions implemented as appropriate)

## 2017-03-11 NOTE — PLAN OF CARE
Problem: Patient Care Overview (Adult)  Goal: Plan of Care Review  Outcome: Ongoing (interventions implemented as appropriate)    03/10/17 5180   Outcome Evaluation   Outcome Summary/Follow up Plan Renal panel cont to worsen. To OR at 1500.        Goal: Adult Individualization and Mutuality  Outcome: Ongoing (interventions implemented as appropriate)  Goal: Discharge Needs Assessment  Outcome: Ongoing (interventions implemented as appropriate)    Problem: Sepsis (Adult)  Goal: Signs and Symptoms of Listed Potential Problems Will be Absent or Manageable (Sepsis)  Outcome: Ongoing (interventions implemented as appropriate)    Problem: Wound, Traumatic, Nonburn (Adult)  Goal: Signs and Symptoms of Listed Potential Problems Will be Absent or Manageable (Wound, Traumatic, Nonburn)  Outcome: Ongoing (interventions implemented as appropriate)    Problem: Fall Risk (Adult)  Goal: Identify Related Risk Factors and Signs and Symptoms  Outcome: Ongoing (interventions implemented as appropriate)  Goal: Absence of Falls  Outcome: Ongoing (interventions implemented as appropriate)    Problem: Perioperative Period (Adult)  Goal: Signs and Symptoms of Listed Potential Problems Will be Absent or Manageable (Perioperative Period)  Outcome: Ongoing (interventions implemented as appropriate)

## 2017-03-11 NOTE — ANESTHESIA POSTPROCEDURE EVALUATION
Patient: Luca Amezcua    Procedure Summary     Date Anesthesia Start Anesthesia Stop Room / Location    03/10/17 4375 [id number]  [facility] /  [facility]       Procedure Diagnosis Surgeon Provider    INCISION AND DRAINAGE BILATERAL GROIN (Left ) Traumatic rhabdomyolysis, initial encounter  (Traumatic rhabdomyolysis, initial encounter [T79.6XXA]) MD Celia Lacey MD          Anesthesia Type: general  Last vitals  BP 90/56 (03/10/17 1915)    Temp      Pulse 89 (03/10/17 1915)   Resp 16 (03/10/17 1915)    SpO2 100 % (03/10/17 1915)      Post Anesthesia Care and Evaluation    Patient location during evaluation: PACU  Patient participation: complete - patient participated  Level of consciousness: awake and alert  Pain management: adequate  Airway patency: patent  Anesthetic complications: No anesthetic complications    Cardiovascular status: acceptable  Respiratory status: acceptable  Hydration status: acceptable
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
## 2017-03-11 NOTE — SIGNIFICANT NOTE
03/11/17 0856   Rehab Treatment   Discipline speech language pathologist   Rehab Evaluation   Evaluation Not Performed unable to evaluate, medical status change;other (see comments)  (Pt remains intubated following surgery. Will hold on evaluation until pt medically appropriate. )

## 2017-03-11 NOTE — PROGRESS NOTES
"Pharmacokinetic Consult - Vancomycin Dosing (Follow-up Note)  Patient: Luca Amezcua  : 1955  MRN: [id number]  Admit date: 3/9/2017  2:33 PM    Day 3  Consult for Dr Jenkins  Treating: SSTI  Goal trough: 15-20 mcg/ml    Relevant clinical data and objective history reviewed:  61 y.o. female 64.02\" (162.6 cm) 236 lb 1.8 oz (107 kg)  Pressure wound on abdomen/groin after being found down. S/p I&D of necrotic areas on 3/10. Unable to extubate postop and required initiation of pressors. No evidence of necrotizing fasciitis per surgery.    Past Medical History   Diagnosis Date   • Bleeding gastrointestinal    • Head trauma      CREATININE   Date Value Ref Range Status   2017 4.21 (H) 0.57 - 1.00 mg/dL Final   03/10/2017 3.25 (H) 0.57 - 1.00 mg/dL Final   03/10/2017 2.70 (H) 0.57 - 1.00 mg/dL Final   03/10/2017 2.70 (H) 0.57 - 1.00 mg/dL Final     BUN   Date Value Ref Range Status   2017 47 (H) 8 - 23 mg/dL Final     Estimated Creatinine Clearance: 16.5 mL/min (by C-G formula based on Cr of 4.21).    Lab Results   Component Value Date    WBC 17.42 (H) 2017    WBC 20.89 (H) 03/10/2017    WBC 17.60 (H) 2017     Temp Readings from Last 3 Encounters:   17 99.1 °F (37.3 °C) (Oral)     Baseline cultures/labs/radiology:  3/9 blood cx x1: Gram + cocci in chains. BCID is negative  3/10 tissue culture pending    IV Anti-Infectives     Ordered     Dose/Rate Route Frequency Start Stop    03/10/17 1401  [MAR Hold]  vancomycin 500 mg/100 mL 0.9% NS IVPB (mbp)     (MAR Hold since 03/10/17 1437)   Ordering Provider:  Aguilar Jenkins MD    500 mg  over 50 Minutes Intravenous Once 03/10/17 1600 03/10/17 1750    03/10/17 0720  piperacillin-tazobactam (ZOSYN) 3.375 g in dextrose 50 mL IVPB (premix)     Ordering Provider:  Danilo Edgar MD    3.375 g  over 4 Hours Intravenous Every 12 Hours 03/10/17 1407      17 0678  Vancomycin Pharmacy Intermittent Dosing     Ordering Provider:  " Aguilar Jenkins MD     Does not apply Daily 03/09/17 1819 03/09/17 1807  Pharmacy to dose vancomycin     Ordering Provider:  Aguilar Jenkins MD     Does not apply Continuous PRN 03/09/17 1806           Vancomycin dosing history:   3/9: 2250mg IV x1 @ 1759  3/10 random: 24.6 mcg/ml, given 500mg IV x1 @ 1700  3/11 0529 Vancomycin random level = 27.6 mcg/ml    Assessment/Plan  1. Random level this AM is above desired range of 15-20 mcg/ml. No further doses today, continue to dose intermittently. Scr continues to increase, minimal UOP.  2. Check random level in AM tomorrow to evaluate.  Pharmacy will continue to follow daily while on vancomycin and adjust as needed.     Thank you,  Ada Lee, PharmD, BCPS  03/11/17 8:19 AM

## 2017-03-11 NOTE — CONSULTS
"Adult Nutrition  Assessment/PES    Patient Name:  Luca Amezcua  YOB: 1955  MRN: [id number]  Admit Date:  3/9/2017    Assessment Date:  3/11/2017        Reason for Assessment       03/11/17 1214    Reason for Assessment    Reason For Assessment/Visit TF/PN   TF assess/cortrak placement                Anthropometrics       03/11/17 1214    Anthropometrics    Height 162.6 cm (64.02\")    Weight 107 kg (235 lb 14.3 oz)    Ideal Body Weight (IBW)    Ideal Body Weight (IBW), Female 55.44    % Ideal Body Weight 193.4    Body Mass Index (BMI)    BMI (kg/m2) 40.56    BMI Grade greater than 40 - obesity grade III            Labs/Tests/Procedures/Meds       03/11/17 1214    Labs/Tests/Procedures/Meds    Diagnostic Test/Procedure Review reviewed    Labs/Tests Review Reviewed;Glucose;Cl-;Creat;BUN;Ion Ca++;GFR;ALT;AST;Alb;Phos    Medication Review Reviewed, pertinent;Insulin;Antacid;Antibiotic    Significant Vitals reviewed            Physical Findings       03/11/17 1215    Physical Findings/Assessment    Additional Documentation Physical Appearance (Group)    Physical Appearance    Overall Physical Appearance on ventilator support;obese    Skin pressure ulcer(s);other (see comments)   bilat groin incision, R lower leg abrasion, R elbow abrasion, L arm immersion, B=14; s/p I and D yest of bilat groin              Nutrition Prescription Ordered       03/11/17 1218    Nutrition Prescription PO    Current PO Diet NPO                Problem/Interventions:        Problem 1       03/11/17 1219    Nutrition Diagnoses Problem 1    Problem 1 Needs Alternate Route    Etiology (related to) Factors Affecting Nutrition   on ventilator support    Signs/Symptoms (evidenced by) Report/Observation                    Intervention Goal       03/11/17 1219    Intervention Goal    General Maintain nutrition    PO Initiate feeding    TF/PN Inititiate TF/PN    Weight No significant weight loss            Nutrition Intervention       " 03/11/17 1219    Nutrition Intervention    RD/Tech Action Follow Tx progress;Care plan reviewd;Recommend/ordered;Await begin PO    Recommended/Ordered EN            Nutrition Prescription       03/11/17 1220    Nutrition Prescription PO    PO Prescription Begin/change supplement    Supplement Carl    Supplement Frequency Daily    Nutrition Prescription EN    Enteral Prescription Enteral begin/change   if they get tube placed and start TFs - may extubate tomorrow so may not place tube    Product Nepro    TF Delivery Method Continuous    Continuous TF Goal Rate (mL/hr) 45 mL/hr    Continuous TF Starting Rate (mL/hr) 20 mL/hr    Water flush (mL)  --   per MD    New EN Prescription Ordered? No, recommended            Education/Evaluation       03/11/17 1222    Education    Education Will Instruct as appropriate    Monitor/Evaluation    Monitor Per protocol;I&O;Weight;Skin status        Comments:  Cortrak placement attempted by 2 RNs and then myself.  Unable to place.  RN to talk to MD about getting NGT placed in fluoro vs starting oral diet tomorrow as patient is planned to be extubated.  If TFs are started, recommend Nepro with goal rate of 45 ml/hr to start at 20 ml/hr and adv by 10 ml q 4 hours.  Flushes per MD order.  Will continue to follow.    Electronically signed by:  Jennifer Beard RD  03/11/17 12:22 PM

## 2017-03-11 NOTE — PROGRESS NOTES
Dr. NELLIE Su    [facility]    3/10/2017    Patient ID:  Name:  Luca Amezcua  MRN:  [id number]  1955  61 y.o.  female            CC/Reason for visit: Follow-up for acute hypoxic respiratory failure status post I&D of groin wound, renal failure, sepsis, rhabdomyolysis    HPI: I was called to the bedside by CCU nurse due to patient returning from the operating room, but this time hypotensive, and in shock, on mechanical ventilation, intubated.  They were having difficulty extubating her in recovery area.  She has absolutely no decent vascular access, only small peripheral veins, which are very friable.  Central venous access was not secured by anesthesiologist.    The patient is intubated, mechanically ventilated.  I reviewed mechanical ventilation settings.    Vitals:  Vitals:    03/10/17 2048 03/10/17 2103 03/10/17 2118 03/10/17 2132   BP: (!) 73/44 (!) 69/51 (!) 73/59 (!) 82/54   BP Location:       Patient Position:       Pulse:  74 78 80   Resp:       Temp:       TempSrc:       SpO2: 99% 100% 100% 100%   Weight:       Height:         FiO2 (%): 40 %     Body mass index is 38.95 kg/(m^2).    Intake/Output Summary (Last 24 hours) at 03/10/17 2151  Last data filed at 03/10/17 2005   Gross per 24 hour   Intake   3673 ml   Output     80 ml   Net   3593 ml       Exam:  GEN:  Morbidly obese, sedated, unarousable, mechanically ventilated and intubated  Oral exam shows endotracheal tube in good position  Neck exam: Very large neck with very small jaw  LUNGS: Clear breath sounds bilat, nonlabored breathing  CV:  Normal S1S2, without murmur, no edema  ABD:  Non tender, no enlarged liver or masses  EXT:  No cyanosis or clubbing  Left-sided Groin area: Dressings are clean dry and intact in the groin area    Scheduled meds:    enoxaparin 30 mg Subcutaneous Daily   insulin aspart 0-9 Units Subcutaneous Q4H   pantoprazole 40 mg Oral Q AM   piperacillin-tazobactam 3.375 g Intravenous Q12H    sodium chloride 1,000 mL Intravenous Once   Vancomycin Pharmacy Intermittent Dosing  Does not apply Daily     IV meds:                        norepinephrine 0.02-0.3 mcg/kg/min Last Rate: 0.08 mcg/kg/min (03/10/17 2146)   Pharmacy to dose vancomycin     propofol 5-50 mcg/kg/min Last Rate: 15 mcg/kg/min (03/10/17 2146)   sodium chloride 9 mL/hr Last Rate: 9 mL/hr (03/10/17 0208)   sodium chloride 125 mL/hr        Data Review:   I reviewed the patient's medications and new clinical results.  Lab Results   Component Value Date    CALCIUM 6.5 (L) 03/10/2017    PHOS 6.2 (H) 03/10/2017     Results from last 7 days  Lab Units 03/10/17  1247 03/10/17  0441 03/09/17  2238 03/09/17  1644  03/09/17  1524   AST (SGOT) U/L  --  349*  --  429*  --   --    SODIUM mmol/L 144 146*  143 145 142  < >  --    POTASSIUM mmol/L 4.1 4.5  4.4 4.4 4.0  < >  --    CHLORIDE mmol/L 97* 100  100 104 102  < >  --    TOTAL CO2 mmol/L 30.8* 19.4*  23.1 18.4* 15.9*  < >  --    BUN mg/dL 41* 37*  37* 33* 32*  < >  --    CREATININE mg/dL 3.25* 2.70*  2.70* 2.22* 2.24*  < >  --    GLUCOSE mg/dL 220* 227*  234* 150* 121*  < >  --    CALCIUM mg/dL 6.5* 7.4*  7.4* 7.9* 8.5*  < >  --    WBC 10*3/mm3  --  20.89*  --   --   --  17.60*   HEMOGLOBIN g/dL  --  13.4  --   --   --  17.0*   PLATELETS 10*3/mm3  --  314  --   --   --  323   ALT (SGPT) U/L  --  137*  --  145*  --   --    < > = values in this interval not displayed.    Results from last 7 days  Lab Units 03/10/17  0441 03/09/17  1644   CK TOTAL U/L >95920* >64778*   TROPONIN T ng/mL  --  0.011       Results from last 7 days  Lab Units 03/10/17  1914   PH, ARTERIAL pH units 7.345*   PO2 ART mm Hg 335.6*   PCO2, ARTERIAL mm Hg 60.3*   HCO3 ART mmol/L 33.0*     Chest x-ray: I visualized the chest x-ray.  Tip of endotracheal tube is in good position.  Tip of the central venous catheter inserted in the left jugular vein is in the right atrium.  No pneumothorax.  Some right perihilar infiltrate  and some left basilar linear atelectasis.    ASSESSMENT:    Acute postoperative respiratory failure  1) Severe rhabdomyolysis  2) Left groin necrotizing fasciitis  3) Oliguric acute renal failure, 2ary to #1 and dehydration  4) Third spacing  5) Lactic acidosis  6) Sepsis with shock  7) Metabolic acidosis   8) Morbid obesity  9) Protein calorie malnutrition (Alb=2.4)    PLAN:  Continue adjusting mechanical ventilation.  Check ABG and chest x-ray in the morning.    Start Levophed to keep blood pressure adequate, with map greater than 65 mmHg.    Continue broad-spectrum antibiotics for necrotizing fasciitis.    Continue fluid boluses and monitor urinary output closely due to rhabdomyolysis.    Total critical care time 35 minutes, excluding any separately billable procedure time    Luc Su MD  3/10/2017

## 2017-03-11 NOTE — PROGRESS NOTES
"[facility] PULMONARY [facility]ARE         Dr Nicholas   LOS: 2 days   Patient Care Team:  No Known Provider as PCP - General    Chief Complaint:  Follow-up on rhabdomyolysis, renal failure, sepsis    Interval History:   Events noted chart reviewed.  Status post debridement of the right bilateral groin area per surgery.  No necrotizing fasciitis noted.  Only Necrosis of the skin noted.  Currently on the ventilator and low dose Levophed.  Wakes up follow simple commands.    REVIEW OF SYSTEMS:   Sedated intubated on the vent.    Ventilator/Non-Invasive Ventilation Settings           Assist controlled.  Failed spontaneous breathing trial.    Objective   Vital Signs  Temp:  [97.8 °F (36.6 °C)-99.1 °F (37.3 °C)] 99.1 °F (37.3 °C)  Heart Rate:  [] 68  Resp:  [6-20] 16  BP: ()/(37-97) 105/64  FiO2 (%):  [40 %-100 %] 40 %    Intake/Output Summary (Last 24 hours) at 03/11/17 0834  Last data filed at 03/11/17 0334   Gross per 24 hour   Intake 2034.5 ml   Output     15 ml   Net 2019.5 ml     Flowsheet Rows         First Filed Value    Admission Height  64\" (162.6 cm) Documented at 03/09/2017 1426    Admission Weight  250 lb (113 kg) Documented at 03/09/2017 1426          Physical Exam:   General Appearance:    sedated intubated.  ET in good position orally wakes up follow simple commands.     Lungs:    diminished but equal breath sounds bilaterally.      Heart:    Regular rhythm and normal rate, normal S1 and S2, no            murmur, no gallop, no rub, no click   Chest Wall:    right breast skin appears to be slightly indurated and edematous.  No redness or discharge    Abdomen:     obese.  Soft.  There is post-debridement of bilateral groin wound.  Appears to be pink with no evidence of necrosis.     Neuro:   sedated intubated.  Following simple commands.     Extremities:   Moves all extremities well, +++ edema in legs and arms, no cyanosis, no             Redness          Results Review:          Results from last 7 " days  Lab Units 03/11/17  0529 03/10/17  1247 03/10/17  0441   SODIUM mmol/L 143 144 146*  143   POTASSIUM mmol/L 4.1 4.1 4.5  4.4   CHLORIDE mmol/L 97* 97* 100  100   TOTAL CO2 mmol/L 25.1 30.8* 19.4*  23.1   BUN mg/dL 47* 41* 37*  37*   CREATININE mg/dL 4.21* 3.25* 2.70*  2.70*   GLUCOSE mg/dL 180* 220* 227*  234*   CALCIUM mg/dL 5.9* 6.5* 7.4*  7.4*       Results from last 7 days  Lab Units 03/11/17  0358 03/10/17  0441 03/09/17  1644   CK TOTAL U/L 03520* >01027* >30560*   TROPONIN T ng/mL  --   --  0.011       Results from last 7 days  Lab Units 03/11/17  0358 03/10/17  0441 03/09/17  1524   WBC 10*3/mm3 17.42* 20.89* 17.60*   HEMOGLOBIN g/dL 10.6* 13.4 17.0*   HEMATOCRIT % 34.1* 43.0 52.4*   PLATELETS 10*3/mm3 288 314 323       Results from last 7 days  Lab Units 03/09/17  1524   INR  1.18*   APTT seconds 26.5         @LABRCNT(bnp)@  I reviewed the patient's new clinical results.  I personally viewed and interpreted the patient's CXR        Medication Review:     enoxaparin 30 mg Subcutaneous Daily   insulin aspart 0-9 Units Subcutaneous Q4H   pantoprazole 40 mg Oral Q AM   piperacillin-tazobactam 3.375 g Intravenous Q12H   Vancomycin Pharmacy Intermittent Dosing  Does not apply Daily         norepinephrine 0.02-0.3 mcg/kg/min Last Rate: 0.08 mcg/kg/min (03/11/17 0814)   Pharmacy to dose vancomycin     propofol 5-50 mcg/kg/min Last Rate: 15 mcg/kg/min (03/11/17 0555)   sodium chloride 9 mL/hr Last Rate: 9 mL/hr (03/10/17 2252)   sodium chloride 125 mL/hr Last Rate: 125 mL/hr (03/11/17 0611)       Assessment/Plan   Acute postoperative respiratory failure  1) Severe rhabdomyolysis  2) postoperative day 1 radical debridement of bilateral necrotic groin wounds with no evidence of fasciitis  3) Oliguric acute renal failure, 2ary to #1 and dehydration  4) Third spacing  5) Lactic acidosis  6) Sepsis, 2arty to #2  7) Metabolic acidosis   8) Morbid obesity  9) Protein calorie malnutrition  (Alb=2.4)    Plan  Failed spontaneous breathing trial today  Continue antibiotics  Possible dialysis today per nephrology  IV fluids/bicarbonate drip per nephrology  Start tube feeds  Discussed with surgery  Continue supportive care  Wean off Levophed as tolerated   Prognosis is guarded    Critical care time 35 minutes     Billie Nicholas MD  03/11/17  8:34 AM

## 2017-03-11 NOTE — PROCEDURES
Central Venous Catheter Insertion Procedure Note    [facility] PROCEDURE NOTE    Indications:  vascular access, septic shock    Procedure Details   Informed consent was obtained for the procedure, including sedation.  Risks of lung perforation, hemorrhage, arrhythmia, and adverse drug reaction were discussed.     Maximum sterile technique was used including usual patient drapes, antiseptics and physician sterile garments.    Under sterile conditions the skin above the on the left internal jugular vein was prepped with Chlorhexidine and covered with a sterile drape. Local anesthesia was applied to the skin and subcutaneous tissues with lidocaine 1%. An 18-gauge needle was then inserted into the vein. A guide wire was then passed easily through the catheter. A quad lumen central venous catheter was then inserted into the vessel over the guide wire. The catheter was sutured into place and dressed following sterile protocol with Biopatch placed.    Findings:  There were no changes to vital signs. All catheter ports were flushed with saline. Patient did tolerate procedure well.    Recommendations:  CXR ordered to verify placement.  Tip of the catheter is in the right atrium.  No pneumothorax.    Luc Su MD  3/10/2017

## 2017-03-11 NOTE — PLAN OF CARE
Problem: Urine Elimination, Impaired (Adult)  Goal: Identify Related Risk Factors and Signs and Symptoms  Outcome: Ongoing (interventions implemented as appropriate)

## 2017-03-11 NOTE — PROGRESS NOTES
Chief Complaint:    POD 1, S/P debridement of bilateral groin wounds    Subjective:    Intubated. Awake.    Objective:    Vitals:    03/11/17 0602 03/11/17 0618 03/11/17 0632 03/11/17 0803   BP: 109/66 119/68 105/64    BP Location:       Patient Position:       Pulse: 72 65 68    Resp:    16   Temp:       TempSrc:       SpO2: 100% 100% 100%    Weight:       Height:           Lungs: Clear  Heart: Regular  Wounds redressed by me. Tissue is viable. No fasciitis. No fat necrosis remains. BS present.   Extremities: Warm    Labs reviewed. WBC 17.42, Creat 4.21. Lactate 1.4    Assessment:    POD 1, S/P debridement of bilateral groin wounds  Rhabdomyolysis  Acute respiratory failure  Acute renal failure    Plan:    Acidosis improved  No need for further debridement based on the appearance of the wounds  Will begin BID local wound care  Okay to begin nutrition support via NG feeding

## 2017-03-11 NOTE — PLAN OF CARE
Problem: Patient Care Overview (Adult)  Goal: Plan of Care Review  Outcome: Ongoing (interventions implemented as appropriate)    03/11/17 1501   Coping/Psychosocial Response Interventions   Plan Of Care Reviewed With family   Patient Care Overview   Progress no change   Outcome Evaluation   Outcome Summary/Follow up Plan remains pn vent. na well. attempting to wean levophed. wakes up and follows command. dressing changed per md order. serous drainage noted. temp max 100.3         Problem: Sepsis (Adult)  Goal: Signs and Symptoms of Listed Potential Problems Will be Absent or Manageable (Sepsis)  Outcome: Ongoing (interventions implemented as appropriate)    Problem: Wound, Traumatic, Nonburn (Adult)  Goal: Signs and Symptoms of Listed Potential Problems Will be Absent or Manageable (Wound, Traumatic, Nonburn)  Outcome: Ongoing (interventions implemented as appropriate)    Problem: Fall Risk (Adult)  Goal: Identify Related Risk Factors and Signs and Symptoms  Outcome: Ongoing (interventions implemented as appropriate)  Goal: Absence of Falls  Outcome: Ongoing (interventions implemented as appropriate)    Problem: Mechanical Ventilation, Invasive (Adult)  Goal: Signs and Symptoms of Listed Potential Problems Will be Absent or Manageable (Mechanical Ventilation, Invasive)  Outcome: Ongoing (interventions implemented as appropriate)    Problem: Urine Elimination, Impaired (Adult)  Goal: Identify Related Risk Factors and Signs and Symptoms  Outcome: Ongoing (interventions implemented as appropriate)  Goal: Effective Urinary Elimination  Outcome: Ongoing (interventions implemented as appropriate)  Goal: Effective Containment of Urine  Outcome: Ongoing (interventions implemented as appropriate)  Goal: Reduced Incontinence Episodes  Outcome: Ongoing (interventions implemented as appropriate)

## 2017-03-12 ENCOUNTER — APPOINTMENT (OUTPATIENT)
Dept: GENERAL RADIOLOGY | Facility: HOSPITAL | Age: 62
End: 2017-03-12

## 2017-03-12 PROBLEM — N17.9 RENAL FAILURE, ACUTE (HCC): Status: ACTIVE | Noted: 2017-03-12

## 2017-03-12 LAB
ANION GAP SERPL CALCULATED.3IONS-SCNC: 17.2 MMOL/L
BACTERIA SPEC AEROBE CULT: ABNORMAL
BACTERIA SPEC AEROBE CULT: ABNORMAL
BUN BLD-MCNC: 48 MG/DL (ref 8–23)
BUN/CREAT SERPL: 9.2 (ref 7–25)
CALCIUM SPEC-SCNC: 5.3 MG/DL (ref 8.6–10.5)
CHLORIDE SERPL-SCNC: 104 MMOL/L (ref 98–107)
CK SERPL-CCNC: ABNORMAL U/L (ref 20–180)
CO2 SERPL-SCNC: 23.8 MMOL/L (ref 22–29)
CREAT BLD-MCNC: 5.22 MG/DL (ref 0.57–1)
DEPRECATED RDW RBC AUTO: 50.5 FL (ref 37–54)
ERYTHROCYTE [DISTWIDTH] IN BLOOD BY AUTOMATED COUNT: 16.5 % (ref 11.7–13)
GFR SERPL CREATININE-BSD FRML MDRD: 8 ML/MIN/1.73
GLUCOSE BLD-MCNC: 106 MG/DL (ref 65–99)
GLUCOSE BLDC GLUCOMTR-MCNC: 100 MG/DL (ref 70–130)
GLUCOSE BLDC GLUCOMTR-MCNC: 105 MG/DL (ref 70–130)
GLUCOSE BLDC GLUCOMTR-MCNC: 107 MG/DL (ref 70–130)
GLUCOSE BLDC GLUCOMTR-MCNC: 73 MG/DL (ref 70–130)
GLUCOSE BLDC GLUCOMTR-MCNC: 91 MG/DL (ref 70–130)
GRAM STN SPEC: ABNORMAL
HCT VFR BLD AUTO: 29.7 % (ref 35.6–45.5)
HGB BLD-MCNC: 9.2 G/DL (ref 11.9–15.5)
INR PPP: 1.23 (ref 0.9–1.1)
ISOLATED FROM: ABNORMAL
MCH RBC QN AUTO: 26.3 PG (ref 26.9–32)
MCHC RBC AUTO-ENTMCNC: 31 G/DL (ref 32.4–36.3)
MCV RBC AUTO: 84.9 FL (ref 80.5–98.2)
PLATELET # BLD AUTO: 194 10*3/MM3 (ref 140–500)
PMV BLD AUTO: 10.1 FL (ref 6–12)
POTASSIUM BLD-SCNC: 3.9 MMOL/L (ref 3.5–5.2)
PROTHROMBIN TIME: 15 SECONDS (ref 11.7–14.2)
RBC # BLD AUTO: 3.5 10*6/MM3 (ref 3.9–5.2)
SODIUM BLD-SCNC: 145 MMOL/L (ref 136–145)
VANCOMYCIN SERPL-MCNC: 22.1 MCG/ML (ref 5–40)
WBC NRBC COR # BLD: 9.86 10*3/MM3 (ref 4.5–10.7)

## 2017-03-12 PROCEDURE — 25010000002 ENOXAPARIN PER 10 MG: Performed by: INTERNAL MEDICINE

## 2017-03-12 PROCEDURE — 05HM33Z INSERTION OF INFUSION DEVICE INTO RIGHT INTERNAL JUGULAR VEIN, PERCUTANEOUS APPROACH: ICD-10-PCS | Performed by: SURGERY

## 2017-03-12 PROCEDURE — 71010 HC CHEST PA OR AP: CPT

## 2017-03-12 PROCEDURE — 82962 GLUCOSE BLOOD TEST: CPT

## 2017-03-12 PROCEDURE — 82550 ASSAY OF CK (CPK): CPT | Performed by: INTERNAL MEDICINE

## 2017-03-12 PROCEDURE — 80202 ASSAY OF VANCOMYCIN: CPT | Performed by: INTERNAL MEDICINE

## 2017-03-12 PROCEDURE — 25010000002 CALCIUM GLUCONATE PER 10 ML: Performed by: INTERNAL MEDICINE

## 2017-03-12 PROCEDURE — 85610 PROTHROMBIN TIME: CPT | Performed by: INTERNAL MEDICINE

## 2017-03-12 PROCEDURE — 99024 POSTOP FOLLOW-UP VISIT: CPT | Performed by: SURGERY

## 2017-03-12 PROCEDURE — 94799 UNLISTED PULMONARY SVC/PX: CPT

## 2017-03-12 PROCEDURE — 80048 BASIC METABOLIC PNL TOTAL CA: CPT | Performed by: INTERNAL MEDICINE

## 2017-03-12 PROCEDURE — 25010000002 HEPARIN (PORCINE) PER 1000 UNITS: Performed by: SURGERY

## 2017-03-12 PROCEDURE — 25010000002 PIPERACILLIN SOD-TAZOBACTAM PER 1 G: Performed by: INTERNAL MEDICINE

## 2017-03-12 PROCEDURE — 25010000002 PROPOFOL 1000 MG/ML EMULSION: Performed by: INTERNAL MEDICINE

## 2017-03-12 PROCEDURE — 94003 VENT MGMT INPAT SUBQ DAY: CPT

## 2017-03-12 PROCEDURE — 25010000002 FENTANYL CITRATE (PF) 100 MCG/2ML SOLUTION: Performed by: INTERNAL MEDICINE

## 2017-03-12 PROCEDURE — 85027 COMPLETE CBC AUTOMATED: CPT | Performed by: INTERNAL MEDICINE

## 2017-03-12 RX ORDER — HEPARIN SODIUM 1000 [USP'U]/ML
10000 INJECTION, SOLUTION INTRAVENOUS; SUBCUTANEOUS AS NEEDED
Status: DISCONTINUED | OUTPATIENT
Start: 2017-03-12 | End: 2017-03-21 | Stop reason: HOSPADM

## 2017-03-12 RX ORDER — HEPARIN SODIUM 1000 [USP'U]/ML
1000 INJECTION, SOLUTION INTRAVENOUS; SUBCUTANEOUS AS NEEDED
Status: COMPLETED | OUTPATIENT
Start: 2017-03-12 | End: 2017-03-12

## 2017-03-12 RX ORDER — CALCIUM GLUCONATE 94 MG/ML
2 INJECTION, SOLUTION INTRAVENOUS ONCE
Status: DISCONTINUED | OUTPATIENT
Start: 2017-03-12 | End: 2017-03-12 | Stop reason: SDUPTHER

## 2017-03-12 RX ADMIN — TAZOBACTAM SODIUM AND PIPERACILLIN SODIUM 3.38 G: 375; 3 INJECTION, SOLUTION INTRAVENOUS at 04:33

## 2017-03-12 RX ADMIN — PROPOFOL 20 MCG/KG/MIN: 10 INJECTION, EMULSION INTRAVENOUS at 08:28

## 2017-03-12 RX ADMIN — SODIUM CHLORIDE 150 ML/HR: 9 INJECTION, SOLUTION INTRAVENOUS at 03:45

## 2017-03-12 RX ADMIN — ENOXAPARIN SODIUM 30 MG: 30 INJECTION SUBCUTANEOUS at 08:28

## 2017-03-12 RX ADMIN — FENTANYL CITRATE 50 MCG: 50 INJECTION INTRAMUSCULAR; INTRAVENOUS at 23:26

## 2017-03-12 RX ADMIN — HEPARIN SODIUM 2000 UNITS: 1000 INJECTION INTRAVENOUS; SUBCUTANEOUS at 13:30

## 2017-03-12 RX ADMIN — PROPOFOL 20 MCG/KG/MIN: 10 INJECTION, EMULSION INTRAVENOUS at 04:07

## 2017-03-12 RX ADMIN — TAZOBACTAM SODIUM AND PIPERACILLIN SODIUM 3.38 G: 375; 3 INJECTION, SOLUTION INTRAVENOUS at 13:31

## 2017-03-12 RX ADMIN — FENTANYL CITRATE 50 MCG: 50 INJECTION INTRAMUSCULAR; INTRAVENOUS at 12:35

## 2017-03-12 RX ADMIN — HEPARIN SODIUM 1000 UNITS: 1000 INJECTION INTRAVENOUS; SUBCUTANEOUS at 13:28

## 2017-03-12 RX ADMIN — CALCIUM GLUCONATE 2 G: 94 INJECTION, SOLUTION INTRAVENOUS at 06:55

## 2017-03-12 NOTE — PROGRESS NOTES
"Pharmacy to dose Vancomycin    Day 4  Consult for Dr Jenkins  Treating: SSTI    Current Vancomycin dose pulse dose based on levels  Lab Results   Component Value Date    VANCORANDOM 22.10 03/12/2017       IV Anti-Infectives     Ordered     Dose/Rate Route Frequency Start Stop    03/10/17 1401  [MAR Hold]  vancomycin 500 mg/100 mL 0.9% NS IVPB (mbp)     (MAR Hold since 03/10/17 1437)   Ordering Provider:  Aguilar Jenkins MD    500 mg  over 50 Minutes Intravenous Once 03/10/17 1600 03/10/17 1750    03/10/17 0720  piperacillin-tazobactam (ZOSYN) 3.375 g in dextrose 50 mL IVPB (premix)     Ordering Provider:  Danilo Edgar MD    3.375 g  over 4 Hours Intravenous Every 12 Hours 03/10/17 1407      03/09/17 1817  Vancomycin Pharmacy Intermittent Dosing     Ordering Provider:  Aguilar Jenkins MD     Does not apply Daily 03/09/17 1819 03/09/17 1807  Pharmacy to dose vancomycin     Ordering Provider:  Aguilar Jenkins MD     Does not apply Continuous PRN 03/09/17 1806             Relevant clinical data and objective history reviewed:  61 y.o. female 64.02\" (162.6 cm) 240 lb 11.9 oz (109 kg)    Lab Results   Component Value Date    CREATININE 5.22 (H) 03/12/2017    CREATININE 4.21 (H) 03/11/2017    CREATININE 3.25 (H) 03/10/2017     Estimated Creatinine Clearance: 13.5 mL/min (by C-G formula based on Cr of 5.22).    Lab Results   Component Value Date    WBC 9.86 03/12/2017    WBC 17.42 (H) 03/11/2017    WBC 20.89 (H) 03/10/2017     Temp Readings from Last 3 Encounters:   03/12/17 98.3 °F (36.8 °C) (Oral)       Baseline cultures:  3/9 blood cx x1: Gram + cocci in chains. BCID is negative  3/10 tissue culture pending    Vancomycin Dosing History  3/9: 2250mg IV x1 @ 1759  3/10 random: 24.6 mcg/ml, given 500mg IV x1 @ 1700  3/11 0529 Vancomycin random level = 27.6 mcg/ml  3/12 428 Random level = 22.1    PLAN: Based on random level today no dose needed.  Will recheck random level tomorrow am and re-dose as " appropriate.  Noted potential for HD to start if renal function does not improve.     Yoana Perez PharmD, BCPS  03/12/17 8:17 AM
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
## 2017-03-12 NOTE — SIGNIFICANT NOTE
03/12/17 1126   Rehab Treatment   Discipline physical therapist   Rehab Evaluation   Evaluation Not Performed other (see comments)  (Pt on vent. Pt will check back tomorrow.)   Recommendation   PT - Next Appointment 03/13/17

## 2017-03-12 NOTE — PROGRESS NOTES
"      [facility]         Dr Nicholas   LOS: 3 days   Patient Care Team:  No Known Provider as PCP - General    Chief Complaint:  Follow-up on rhabdomyolysis, renal failure, sepsis    Interval History:   Postoperative day 2 Status post debridement of the right bilateral groin area per surgery.  No necrotizing fasciitis noted.  Only Necrosis of the skin noted.  Currently on the ventilator and off sedation completely.  Earlier was having episodes of bradycardia with propofol.  Now awake and following commands.    REVIEW OF SYSTEMS:   Sedated intubated on the vent.    Ventilator/Non-Invasive Ventilation Settings           Assist controlled.  Tolerating spontaneous breathing trial well today.    Objective   Vital Signs  Temp:  [98.3 °F (36.8 °C)-100.3 °F (37.9 °C)] 98.3 °F (36.8 °C)  Heart Rate:  [54-92] 54  Resp:  [16-26] 26  BP: ()/(50-77) 92/55  FiO2 (%):  [40 %] 40 %    Intake/Output Summary (Last 24 hours) at 03/12/17 1107  Last data filed at 03/12/17 0407   Gross per 24 hour   Intake 4074.4 ml   Output     25 ml   Net 4049.4 ml     Flowsheet Rows         First Filed Value    Admission Height  64\" (162.6 cm) Documented at 03/09/2017 1426    Admission Weight  250 lb (113 kg) Documented at 03/09/2017 1426          Physical Exam:   General Appearance:    sedated intubated.  ET in good position orally wakes up follow simple commands.     Lungs:    diminished but equal breath sounds bilaterally.      Heart:    Regular rhythm and normal rate, normal S1 and S2, no            murmur, no gallop, no rub, no click   Chest Wall:    right breast skin appears to be slightly indurated and edematous.  No redness or discharge    Abdomen:     obese.  Soft.  There is post-debridement of bilateral groin wound.  Appears to be pink with no evidence of necrosis.     Neuro:   sedated intubated.  Following simple commands.     Extremities:   Moves all extremities well, +++ edema in legs and arms, no cyanosis, no           "   Redness          Results Review:          Results from last 7 days  Lab Units 03/12/17  0428 03/11/17  1314 03/11/17  0529 03/10/17  1247   SODIUM mmol/L 145  --  143 144   POTASSIUM mmol/L 3.9 4.0 4.1 4.1   CHLORIDE mmol/L 104  --  97* 97*   TOTAL CO2 mmol/L 23.8  --  25.1 30.8*   BUN mg/dL 48*  --  47* 41*   CREATININE mg/dL 5.22*  --  4.21* 3.25*   GLUCOSE mg/dL 106*  --  180* 220*   CALCIUM mg/dL 5.3*  --  5.9* 6.5*       Results from last 7 days  Lab Units 03/11/17  1314 03/11/17  0358 03/10/17  0441 03/09/17  1644   CK TOTAL U/L 90448* 83828* >30527* >26686*   TROPONIN T ng/mL  --   --   --  0.011       Results from last 7 days  Lab Units 03/12/17  0428 03/11/17  0358 03/10/17  0441   WBC 10*3/mm3 9.86 17.42* 20.89*   HEMOGLOBIN g/dL 9.2* 10.6* 13.4   HEMATOCRIT % 29.7* 34.1* 43.0   PLATELETS 10*3/mm3 194 288 314       Results from last 7 days  Lab Units 03/12/17  1022 03/09/17  1524   INR  1.23* 1.18*   APTT seconds  --  26.5         @[facility](bnp)@  I reviewed the patient's new clinical results.  I personally viewed and interpreted the patient's CXR        Medication Review:     enoxaparin 30 mg Subcutaneous Daily   insulin aspart 0-9 Units Subcutaneous Q4H   pantoprazole 40 mg Oral Q AM   piperacillin-tazobactam 3.375 g Intravenous Q12H   Vancomycin Pharmacy Intermittent Dosing  Does not apply Daily         norepinephrine 0.02-0.3 mcg/kg/min Last Rate: 0.02 mcg/kg/min (03/12/17 0553)   Pharmacy to dose vancomycin     propofol 5-50 mcg/kg/min Last Rate: 20 mcg/kg/min (03/12/17 7267)   sodium chloride 9 mL/hr Last Rate: 9 mL/hr (03/10/17 7613)   sodium chloride 150 mL/hr Last Rate: 150 mL/hr (03/12/17 8057)       Assessment/Plan   Acute postoperative respiratory failure  1) Severe rhabdomyolysis  2) postoperative day 1 radical debridement of bilateral necrotic groin wounds with no evidence of fasciitis  3) Oliguric acute renal failure, 2ary to #1 and dehydration  4) Third spacing  5) Lactic acidosis  6)  Sepsis, 2arty to #2  7) Metabolic acidosis   8) Morbid obesity  9) Protein calorie malnutrition (Alb=2.4)    Plan  Tolerating spontaneous breathing trial well today.  Will likely extubate  Continue antibiotics   dialysis today per nephrology  IV fluids/bicarbonate drip per nephrology  If extubated possible swallow evaluation  Continue supportive care  Continue current care    Critical care time 35 minutes     Billie Nicholas MD  03/12/17  11:07 AM

## 2017-03-12 NOTE — PROGRESS NOTES
Chief Complaint:    POD 2, S/P debridement of bilateral groin wounds    Subjective:    Intubated. Awake.    Objective:    Vitals:    03/12/17 0805 03/12/17 0835 03/12/17 0905 03/12/17 1105   BP: 112/65 110/62 92/55 118/72   BP Location:       Patient Position:       Pulse: 61 64 54 68   Resp:       Temp:       TempSrc:       SpO2: 99% 100% 99% 100%   Weight:       Height:           Lungs: Clear  Heart: Regular  Abdomen is soft and not distended. BS present. Wounds dressed.   Extremities: Warm    Labs reviewed. WBC 9.86, Creat 5.22.    Assessment:    POD 2, S/P debridement of bilateral groin wounds  Rhabdomyolysis  Acute respiratory failure  Acute renal failure    Plan:    Acidosis improved  Continue BID wound care with normal saline  Possible extubation today

## 2017-03-12 NOTE — PLAN OF CARE
Problem: Patient Care Overview (Adult)  Goal: Plan of Care Review  Outcome: Ongoing (interventions implemented as appropriate)    03/12/17 0513   Coping/Psychosocial Response Interventions   Plan Of Care Reviewed With patient;sibling   Outcome Evaluation   Outcome Summary/Follow up Plan afebrile this shift 5 cc uop is all off levophed sine 0030 propofol at 20 anastasia brother calls with access code and events reviewed he is aware pt may need dialysis brother reports he will be here this am to see and consider consents dressing changed per order tolerated well heart rate now dropping in the low 40's but not sustaining          Problem: Sepsis (Adult)  Goal: Signs and Symptoms of Listed Potential Problems Will be Absent or Manageable (Sepsis)  Outcome: Ongoing (interventions implemented as appropriate)    Problem: Wound, Traumatic, Nonburn (Adult)  Goal: Signs and Symptoms of Listed Potential Problems Will be Absent or Manageable (Wound, Traumatic, Nonburn)  Outcome: Ongoing (interventions implemented as appropriate)    Problem: Fall Risk (Adult)  Goal: Identify Related Risk Factors and Signs and Symptoms  Outcome: Ongoing (interventions implemented as appropriate)    Problem: Mechanical Ventilation, Invasive (Adult)  Goal: Signs and Symptoms of Listed Potential Problems Will be Absent or Manageable (Mechanical Ventilation, Invasive)  Outcome: Ongoing (interventions implemented as appropriate)

## 2017-03-12 NOTE — OP NOTE
Operative Note  Date of Admission:  3/9/2017  OR Date: 3/12/2017    Pre-op Diagnosis:    * Traumatic rhabdomyolysis, initial encounter [T79.6XXA]  ARF    Post-op Diagnosis:     Post-Op Diagnosis Codes:     * Traumatic rhabdomyolysis, initial encounter [T79.6XXA]    Procedure:   1) duplex directed right internal jugular Shiley hemodialysis catheter placement    Surgeon: Maicol Monique MD        Anesthesia:local  Staff:   Circulator: Lorri Davis RN  Scrub Person: Miguelangel Garcia  Assistant: JUAN ALBERTO Mota    Estimated Blood Loss: Minimal    Complications: none    Findings: see dictation    Indications:    The patient is an 61 y.o. female seen for evaluation and will failure due to rhabdomyolysis.  Stat Shiley placement is being requested.  Consent was gotten from family.       Procedure:    Patient was prepped and draped sterilely.  Using duplex direction we localize what appeared to be duplicate internal jugular veins with large plexus throughout the neck.  Access the vein most proximal to the skin passing wire with than minimal difficulty into the main or vein down into the chest.  His wire in place chart was incised dilated and 16 cm Shiley  Eugenio catheter was placed to 16 cm and sutured into place with silk suture.  All ports flushed well with heparinized saline instilled at completion.  Portable chest x-ray is pending    Radiographic Findings:  Duplex interrogation indicates patent jugular vein.  There are large varix type veins and a plexus around the jugular vein that become confluent with that at the low neck.  Photo documentation of wire in place is performed      Active Hospital Problems (** Indicates Principal Problem)    Diagnosis Date Noted   • Renal failure, acute [N17.9] 03/12/2017   • Sepsis [A41.9] 03/09/2017      Resolved Hospital Problems    Diagnosis Date Noted Date Resolved   No resolved problems to display.      Abdiel Monique MD     Date: 3/12/2017  Time: 1:16 PM

## 2017-03-12 NOTE — PLAN OF CARE
Problem: Patient Care Overview (Adult)  Goal: Plan of Care Review  Outcome: Ongoing (interventions implemented as appropriate)    03/12/17 1463   Coping/Psychosocial Response Interventions   Plan Of Care Reviewed With patient;family   Patient Care Overview   Progress improving   Outcome Evaluation   Outcome Summary/Follow up Plan extubated without diff. o2 at 4L, pt to receive dialysis today, dialysis cath placed without diff. speech asked to see in am.          Problem: Sepsis (Adult)  Goal: Signs and Symptoms of Listed Potential Problems Will be Absent or Manageable (Sepsis)  Outcome: Ongoing (interventions implemented as appropriate)    Problem: Wound, Traumatic, Nonburn (Adult)  Goal: Signs and Symptoms of Listed Potential Problems Will be Absent or Manageable (Wound, Traumatic, Nonburn)  Outcome: Ongoing (interventions implemented as appropriate)    Problem: Fall Risk (Adult)  Goal: Identify Related Risk Factors and Signs and Symptoms  Outcome: Ongoing (interventions implemented as appropriate)  Goal: Absence of Falls  Outcome: Ongoing (interventions implemented as appropriate)    Problem: Mechanical Ventilation, Invasive (Adult)  Goal: Signs and Symptoms of Listed Potential Problems Will be Absent or Manageable (Mechanical Ventilation, Invasive)  Outcome: Outcome(s) achieved Date Met:  03/12/17    Problem: Urine Elimination, Impaired (Adult)  Goal: Identify Related Risk Factors and Signs and Symptoms  Outcome: Ongoing (interventions implemented as appropriate)  Goal: Effective Urinary Elimination  Outcome: Ongoing (interventions implemented as appropriate)  Goal: Effective Containment of Urine  Outcome: Ongoing (interventions implemented as appropriate)  Goal: Reduced Incontinence Episodes  Outcome: Ongoing (interventions implemented as appropriate)

## 2017-03-13 LAB
ANION GAP SERPL CALCULATED.3IONS-SCNC: 20 MMOL/L
BUN BLD-MCNC: 35 MG/DL (ref 8–23)
BUN/CREAT SERPL: 8.4 (ref 7–25)
CALCIUM SPEC-SCNC: 6.8 MG/DL (ref 8.6–10.5)
CHLORIDE SERPL-SCNC: 98 MMOL/L (ref 98–107)
CO2 SERPL-SCNC: 22 MMOL/L (ref 22–29)
CREAT BLD-MCNC: 4.15 MG/DL (ref 0.57–1)
DEPRECATED RDW RBC AUTO: 50.5 FL (ref 37–54)
ERYTHROCYTE [DISTWIDTH] IN BLOOD BY AUTOMATED COUNT: 16.1 % (ref 11.7–13)
GFR SERPL CREATININE-BSD FRML MDRD: 11 ML/MIN/1.73
GLUCOSE BLD-MCNC: 63 MG/DL (ref 65–99)
GLUCOSE BLDC GLUCOMTR-MCNC: 116 MG/DL (ref 70–130)
GLUCOSE BLDC GLUCOMTR-MCNC: 67 MG/DL (ref 70–130)
GLUCOSE BLDC GLUCOMTR-MCNC: 85 MG/DL (ref 70–130)
GLUCOSE BLDC GLUCOMTR-MCNC: 93 MG/DL (ref 70–130)
GLUCOSE BLDC GLUCOMTR-MCNC: 98 MG/DL (ref 70–130)
HBV SURFACE AB SER RIA-ACNC: NORMAL
HBV SURFACE AG SERPL QL IA: NORMAL
HCT VFR BLD AUTO: 32.3 % (ref 35.6–45.5)
HGB BLD-MCNC: 9.9 G/DL (ref 11.9–15.5)
MCH RBC QN AUTO: 26.3 PG (ref 26.9–32)
MCHC RBC AUTO-ENTMCNC: 30.7 G/DL (ref 32.4–36.3)
MCV RBC AUTO: 85.9 FL (ref 80.5–98.2)
PLATELET # BLD AUTO: 200 10*3/MM3 (ref 140–500)
PMV BLD AUTO: 9.9 FL (ref 6–12)
POTASSIUM BLD-SCNC: 3.8 MMOL/L (ref 3.5–5.2)
RBC # BLD AUTO: 3.76 10*6/MM3 (ref 3.9–5.2)
SODIUM BLD-SCNC: 140 MMOL/L (ref 136–145)
VANCOMYCIN SERPL-MCNC: 17.2 MCG/ML (ref 5–40)
WBC NRBC COR # BLD: 11.15 10*3/MM3 (ref 4.5–10.7)

## 2017-03-13 PROCEDURE — 97110 THERAPEUTIC EXERCISES: CPT

## 2017-03-13 PROCEDURE — 80048 BASIC METABOLIC PNL TOTAL CA: CPT | Performed by: INTERNAL MEDICINE

## 2017-03-13 PROCEDURE — 25010000002 ENOXAPARIN PER 10 MG: Performed by: INTERNAL MEDICINE

## 2017-03-13 PROCEDURE — 87340 HEPATITIS B SURFACE AG IA: CPT | Performed by: INTERNAL MEDICINE

## 2017-03-13 PROCEDURE — 85027 COMPLETE CBC AUTOMATED: CPT | Performed by: INTERNAL MEDICINE

## 2017-03-13 PROCEDURE — 87040 BLOOD CULTURE FOR BACTERIA: CPT | Performed by: INTERNAL MEDICINE

## 2017-03-13 PROCEDURE — 25010000002 CALCIUM GLUCONATE PER 10 ML: Performed by: INTERNAL MEDICINE

## 2017-03-13 PROCEDURE — 97162 PT EVAL MOD COMPLEX 30 MIN: CPT

## 2017-03-13 PROCEDURE — 25010000002 FENTANYL CITRATE (PF) 100 MCG/2ML SOLUTION: Performed by: INTERNAL MEDICINE

## 2017-03-13 PROCEDURE — 82962 GLUCOSE BLOOD TEST: CPT

## 2017-03-13 PROCEDURE — 92610 EVALUATE SWALLOWING FUNCTION: CPT

## 2017-03-13 PROCEDURE — 86704 HEP B CORE ANTIBODY TOTAL: CPT | Performed by: INTERNAL MEDICINE

## 2017-03-13 PROCEDURE — 25010000002 PIPERACILLIN SOD-TAZOBACTAM PER 1 G: Performed by: INTERNAL MEDICINE

## 2017-03-13 PROCEDURE — 99024 POSTOP FOLLOW-UP VISIT: CPT | Performed by: SURGERY

## 2017-03-13 PROCEDURE — 99223 1ST HOSP IP/OBS HIGH 75: CPT | Performed by: INTERNAL MEDICINE

## 2017-03-13 PROCEDURE — 86706 HEP B SURFACE ANTIBODY: CPT | Performed by: INTERNAL MEDICINE

## 2017-03-13 PROCEDURE — 25010000002 VANCOMYCIN: Performed by: INTERNAL MEDICINE

## 2017-03-13 PROCEDURE — 80202 ASSAY OF VANCOMYCIN: CPT | Performed by: INTERNAL MEDICINE

## 2017-03-13 RX ORDER — PANTOPRAZOLE SODIUM 40 MG/10ML
40 INJECTION, POWDER, LYOPHILIZED, FOR SOLUTION INTRAVENOUS
Status: DISCONTINUED | OUTPATIENT
Start: 2017-03-13 | End: 2017-03-15

## 2017-03-13 RX ORDER — HEPARIN SODIUM 5000 [USP'U]/ML
5000 INJECTION, SOLUTION INTRAVENOUS; SUBCUTANEOUS EVERY 8 HOURS SCHEDULED
Status: DISCONTINUED | OUTPATIENT
Start: 2017-03-13 | End: 2017-03-13

## 2017-03-13 RX ORDER — HEPARIN SODIUM 5000 [USP'U]/ML
5000 INJECTION, SOLUTION INTRAVENOUS; SUBCUTANEOUS EVERY 8 HOURS SCHEDULED
Status: DISCONTINUED | OUTPATIENT
Start: 2017-03-14 | End: 2017-03-21 | Stop reason: HOSPADM

## 2017-03-13 RX ORDER — ALBUMIN (HUMAN) 12.5 G/50ML
12.5 SOLUTION INTRAVENOUS AS NEEDED
Status: ACTIVE | OUTPATIENT
Start: 2017-03-13 | End: 2017-03-14

## 2017-03-13 RX ADMIN — FENTANYL CITRATE 25 MCG: 50 INJECTION INTRAMUSCULAR; INTRAVENOUS at 10:31

## 2017-03-13 RX ADMIN — DEXTROSE MONOHYDRATE 25 G: 25 INJECTION, SOLUTION INTRAVENOUS at 08:10

## 2017-03-13 RX ADMIN — TAZOBACTAM SODIUM AND PIPERACILLIN SODIUM 3.38 G: 375; 3 INJECTION, SOLUTION INTRAVENOUS at 01:30

## 2017-03-13 RX ADMIN — FENTANYL CITRATE 50 MCG: 50 INJECTION INTRAMUSCULAR; INTRAVENOUS at 00:46

## 2017-03-13 RX ADMIN — PANTOPRAZOLE SODIUM 40 MG: 40 INJECTION, POWDER, FOR SOLUTION INTRAVENOUS at 13:16

## 2017-03-13 RX ADMIN — FENTANYL CITRATE 50 MCG: 50 INJECTION INTRAMUSCULAR; INTRAVENOUS at 04:37

## 2017-03-13 RX ADMIN — VANCOMYCIN HYDROCHLORIDE 500 MG: 500 INJECTION, POWDER, LYOPHILIZED, FOR SOLUTION INTRAVENOUS at 10:21

## 2017-03-13 RX ADMIN — ENOXAPARIN SODIUM 30 MG: 30 INJECTION SUBCUTANEOUS at 08:24

## 2017-03-13 RX ADMIN — CALCIUM GLUCONATE 1 G: 94 INJECTION, SOLUTION INTRAVENOUS at 15:23

## 2017-03-13 RX ADMIN — FENTANYL CITRATE 25 MCG: 50 INJECTION INTRAMUSCULAR; INTRAVENOUS at 23:15
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
## 2017-03-13 NOTE — PROGRESS NOTES
Discharge Planning Assessment  [facility]     Patient Name: Luca Amezcua  MRN: [id number]  Today's Date: 3/13/2017    Admit Date: 3/9/2017          Discharge Needs Assessment       03/13/17 1325    Living Environment    Lives With alone    Living Arrangements house    Provides Primary Care For no one, unable/limited ability to care for self    Quality Of Family Relationships unable to assess    Able to Return to Prior Living Arrangements no    Discharge Needs Assessment    Concerns To Be Addressed basic needs concerns;adjustment to diagnosis/illness concerns;patient refuses services;discharge planning concerns    Concerns Comments According to family, pt refuses to leave her home but is unable to care for herself and falls frequently.    Readmission Within The Last 30 Days no previous admission in last 30 days    Anticipated Changes Related to Illness inability to care for self    Equipment Currently Used at Home walker, standard    Equipment Needed After Discharge --   undetermined    Discharge Facility/Level Of Care Needs nursing facility, skilled    Transportation Available ambulance    Current Discharge Risk dependent with mobility/activities of daily living;lack of support system/caregiver;lives alone    Discharge Disposition still a patient            Discharge Plan       03/13/17 1327    Case Management/Social Work Plan    Plan Undetermined    Patient/Family In Agreement With Plan yes    Additional Comments Spoke with pt's brother by phone and with his permission with pt's aunt Socorro Juarez in the hallway.  Pt is not able to participate in the conversation at this time due to her condition.  Introduced self and explained role.  CCP contact information provided.  Face sheet verified and IMM received.  Started discussion with family about SNFs in this area.  Per her brother, he will review information provided (RtoR and SNF list).  CCP will follow up with pt when she is able.          Discharge Placement      No information found                Demographic Summary       03/13/17 1323    Referral Information    Admission Type inpatient    Arrived From home or self-care            Functional Status       03/13/17 1324    Functional Status Prior    Ambulation 1-->assistive equipment    Transferring 1-->assistive equipment    Toileting 0-->independent    Bathing 0-->independent    Dressing 0-->independent    Eating 0-->independent    Communication 0-->understands/communicates without difficulty    Swallowing 0-->swallows foods/liquids without difficulty    Prior Functional Level Comment Family reports that pt was not caring for herself at home and they feel that she cannot return there alone.      IADL    Medications independent    Meal Preparation independent    Housekeeping independent    Laundry independent    Shopping independent    Oral Care independent            Psychosocial     None            Abuse/Neglect     None            Legal     None            Substance Abuse     None            Patient Forms       03/13/17 1331    Patient Forms    Provider Choice List Delivered   RtoR and SNF list.  Given to pt's aunt Pat who will give to pt's brother.    Delivered to Support person    Method of delivery In person          Allison Shaffer RN

## 2017-03-13 NOTE — PLAN OF CARE
Problem: Patient Care Overview (Adult)  Goal: Plan of Care Review  Outcome: Ongoing (interventions implemented as appropriate)    Problem: Sepsis (Adult)  Goal: Signs and Symptoms of Listed Potential Problems Will be Absent or Manageable (Sepsis)  Outcome: Ongoing (interventions implemented as appropriate)    Problem: Wound, Traumatic, Nonburn (Adult)  Goal: Signs and Symptoms of Listed Potential Problems Will be Absent or Manageable (Wound, Traumatic, Nonburn)  Outcome: Ongoing (interventions implemented as appropriate)    Problem: Fall Risk (Adult)  Goal: Identify Related Risk Factors and Signs and Symptoms  Outcome: Ongoing (interventions implemented as appropriate)    Problem: Urine Elimination, Impaired (Adult)  Goal: Identify Related Risk Factors and Signs and Symptoms  Outcome: Ongoing (interventions implemented as appropriate)

## 2017-03-13 NOTE — PROGRESS NOTES
"      [facility]         Dr Nicholas   LOS: 4 days   Patient Care Team:  No Known Provider as PCP - General    Chief Complaint:  Follow-up on rhabdomyolysis, renal failure, sepsis    Interval History:   Postoperative day 3 Status post debridement of the right bilateral groin area per surgery.  No necrotizing fasciitis noted.  Only Necrosis of the skin noted.  Extubated yesterday and still sounding hoarse.  Failed swallow eval earlier.  REVIEW OF SYSTEMS:   Hoarseness but denies any shortness of breath or cough.    Ventilator/Non-Invasive Ventilation Settings           none    Objective   Vital Signs  Temp:  [97.8 °F (36.6 °C)-98.5 °F (36.9 °C)] 97.8 °F (36.6 °C)  Heart Rate:  [0-84] 73  Resp:  [18-20] 20  BP: ()/(59-89) 141/86  FiO2 (%):  [40 %] 40 %    Intake/Output Summary (Last 24 hours) at 03/13/17 1118  Last data filed at 03/13/17 0526   Gross per 24 hour   Intake      0 ml   Output     10 ml   Net    -10 ml     Flowsheet Rows         First Filed Value    Admission Height  64\" (162.6 cm) Documented at 03/09/2017 1426    Admission Weight  250 lb (113 kg) Documented at 03/09/2017 1426          Physical Exam:   General Appearance:    awake no distress following simple commands..     Lungs:    diminished but equal breath sounds bilaterally.      Heart:    Regular rhythm and normal rate, normal S1 and S2, no            murmur, no gallop, no rub, no click   Chest Wall:    right breast skin appears to be slightly indurated and edematous.  No redness or discharge    Abdomen:     obese.  Soft.  There is post-debridement of bilateral groin wound.  Appears to be pink with no evidence of necrosis.     Neuro:   sedated intubated.  Following simple commands.     Extremities:   Moves all extremities well, +++ edema in legs and arms, no cyanosis, no             Redness          Results Review:          Results from last 7 days  Lab Units 03/13/17  0431 03/12/17  0428 03/11/17  1314 03/11/17  0529   SODIUM " mmol/L 140 145  --  143   POTASSIUM mmol/L 3.8 3.9 4.0 4.1   CHLORIDE mmol/L 98 104  --  97*   TOTAL CO2 mmol/L 22.0 23.8  --  25.1   BUN mg/dL 35* 48*  --  47*   CREATININE mg/dL 4.15* 5.22*  --  4.21*   GLUCOSE mg/dL 63* 106*  --  180*   CALCIUM mg/dL 6.8* 5.3*  --  5.9*       Results from last 7 days  Lab Units 03/12/17  1022 03/11/17  1314 03/11/17  0358  03/09/17  1644   CK TOTAL U/L 67585* 23798* 18326*  < > >29915*   TROPONIN T ng/mL  --   --   --   --  0.011   < > = values in this interval not displayed.    Results from last 7 days  Lab Units 03/13/17  0431 03/12/17  0428 03/11/17  0358   WBC 10*3/mm3 11.15* 9.86 17.42*   HEMOGLOBIN g/dL 9.9* 9.2* 10.6*   HEMATOCRIT % 32.3* 29.7* 34.1*   PLATELETS 10*3/mm3 200 194 288       Results from last 7 days  Lab Units 03/12/17  1022 03/09/17  1524   INR  1.23* 1.18*   APTT seconds  --  26.5         @[facility]@  I reviewed the patient's new clinical results.  I personally viewed and interpreted the patient's CXR        Medication Review:     heparin (porcine) 5,000 Units Subcutaneous Q8H   insulin aspart 0-9 Units Subcutaneous Q4H   pantoprazole 40 mg Intravenous QAM AC   Vancomycin Pharmacy Intermittent Dosing  Does not apply Daily         norepinephrine 0.02-0.3 mcg/kg/min Last Rate: 0.02 mcg/kg/min (03/12/17 0884)   Pharmacy to dose vancomycin     propofol 5-50 mcg/kg/min Last Rate: Stopped (03/12/17 0910)   sodium chloride 9 mL/hr Last Rate: 9 mL/hr (03/10/17 6039)       Assessment/Plan   Acute postoperative respiratory failure  1) Severe rhabdomyolysis  2) postoperative day 3 radical debridement of bilateral necrotic groin wounds with no evidence of fasciitis wound culture positive for MRSA  3) Oliguric acute renal failure, 2ary to #1 and dehydration  4) Third spacing  5) Lactic acidosis  6) Sepsis, 2arty to #2  7) Metabolic acidosis   8) Morbid obesity  9) Protein calorie malnutrition (Alb=2.4)    Plan  Wound culture positive for MRSA  Continue  vancomycin  Consult infectious diseases   dialysis  per nephrology  IV fluids/bicarbonate drip per nephrology  Repeat swallow eval later today  Physical therapy  Continue supportive care  Continue current care      Billie Nicholas MD  03/13/17  11:18 AM

## 2017-03-13 NOTE — PROGRESS NOTES
"Pharmacokinetic Consult - Vancomycin Dosing (Follow-up Note)  Patient: Luca Amezcua  : 1955  MRN: [id number]  Admit date: 3/9/2017  2:33 PM    Day 5  Consult for Dr Jenkins  Treating: SSTI  Goal random level: 15-20 mcg/ml    Relevant clinical data and objective history reviewed:  61 y.o. female 64.02\" (162.6 cm) 233 lb 11 oz (106 kg)  Pt with necrotic wounds of abdomen/groin after being found down. No necrotizing fasciitis per surgery. S/p extensive debridement.    Past Medical History   Diagnosis Date   • Bleeding gastrointestinal    • Head trauma      CREATININE   Date Value Ref Range Status   2017 4.15 (H) 0.57 - 1.00 mg/dL Final   2017 5.22 (H) 0.57 - 1.00 mg/dL Final   2017 4.21 (H) 0.57 - 1.00 mg/dL Final     BUN   Date Value Ref Range Status   2017 35 (H) 8 - 23 mg/dL Final     Estimated Creatinine Clearance: 16.7 mL/min (by C-G formula based on Cr of 4.15).    Lab Results   Component Value Date    WBC 11.15 (H) 2017    WBC 9.86 2017    WBC 17.42 (H) 2017     Temp Readings from Last 3 Encounters:   17 97.8 °F (36.6 °C) (Oral)     Baseline cultures/labs/radiology:  3/9 blood cx 1 of 1: Strep mutans, PCN sensitive, vanc sens  3/10 tissue culture: scant growth MRSA    IV Anti-Infectives     Ordered     Dose/Rate Route Frequency Start Stop    03/10/17 0720  piperacillin-tazobactam (ZOSYN) 3.375 g in dextrose 50 mL IVPB (premix)     Ordering Provider:  Danilo Edgar MD    3.375 g  over 4 Hours Intravenous Every 12 Hours 03/10/17 1407      17  Vancomycin Pharmacy Intermittent Dosing     Ordering Provider:  Aguilar Jenkins MD     Does not apply Daily 17 18117  Pharmacy to dose vancomycin     Ordering Provider:  Aguilar Jenkins MD     Does not apply Continuous PRN 03/09/17 1806           Vancomycin dosing history:   3/9: 2250mg IV x1 @ 1759  3/10 random: 24.6 mcg/ml, given 500mg IV x1 @ 1700  3/11 0529 " Vancomycin random level = 27.6 mcg/ml  3/12 428 Random level = 22.1, no dose given, received HD x1  3/13 0431 random level= 17.2 mcg/ml     Assessment/Plan  1. Pt received HD yesterday, random level this AM down to 17.2 mcg/ml, within goal range of 15-20 mcg/ml. Will give dose of 500mg IV x1 now.  2. UOP remains minimal. Will recheck random vancomycin level in AM tomorrow to evaluate for further dosing.   Pharmacy will continue to follow daily while on vancomycin and adjust as needed.     Thank you,  Ada Lee, PharmD, BCPS  03/13/17 8:05 AM

## 2017-03-13 NOTE — PLAN OF CARE
Problem: Patient Care Overview (Adult)  Goal: Plan of Care Review    03/13/17 0929   Coping/Psychosocial Response Interventions   Plan Of Care Reviewed With patient         Problem: Inpatient SLP  Goal: Dysphagia- Patient will improve swallowing skills to begin to take some PO safely    03/13/17 0929   Begin to Take Some PO Safely   Begin to Take Some PO Safely- SLP, Date Established 03/13/17   Begin to Take Some PO Safely- SLP, Time to Achieve by discharge
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
## 2017-03-13 NOTE — PROGRESS NOTES
Postoperative day #3 from debridement of bilateral groin wounds    Subjective:  Patient complains of some mild pain but seems to be tolerating her dressing changes okay.    Objective:  Wounds were examined by me.  Better very healthy.  No significant granulation tissue.  No cellulitis.  No evidence for necrotic muscle.    Assessment and plan:  Status post pressure necrosis to bilateral groins.  I think are looking good enough that we should place a wound VAC.  This is going to be a long-term need for wound VAC.    Andrea Fried MD  General and Endoscopic Surgery  [facility]    [address] [place] [address], [address]  [place], [place], [address]  P: [phone]  F: [phone]

## 2017-03-14 LAB
ALBUMIN SERPL-MCNC: 2.8 G/DL (ref 3.5–5.2)
ALP SERPL-CCNC: 153 U/L (ref 39–117)
ALT SERPL W P-5'-P-CCNC: 108 U/L (ref 1–33)
ANION GAP SERPL CALCULATED.3IONS-SCNC: 25.5 MMOL/L
AST SERPL-CCNC: 115 U/L (ref 1–32)
BACTERIA SPEC AEROBE CULT: ABNORMAL
BACTERIA SPEC AEROBE CULT: ABNORMAL
BASOPHILS # BLD AUTO: 0.02 10*3/MM3 (ref 0–0.2)
BASOPHILS NFR BLD AUTO: 0.2 % (ref 0–1.5)
BILIRUB CONJ SERPL-MCNC: 0.3 MG/DL (ref 0–0.3)
BILIRUB INDIRECT SERPL-MCNC: 0.1 MG/DL
BILIRUB SERPL-MCNC: 0.4 MG/DL (ref 0.1–1.2)
BUN BLD-MCNC: 45 MG/DL (ref 8–23)
BUN/CREAT SERPL: 7.9 (ref 7–25)
CALCIUM SPEC-SCNC: 7.1 MG/DL (ref 8.6–10.5)
CHLORIDE SERPL-SCNC: 97 MMOL/L (ref 98–107)
CO2 SERPL-SCNC: 19.5 MMOL/L (ref 22–29)
CREAT BLD-MCNC: 5.69 MG/DL (ref 0.57–1)
DEPRECATED RDW RBC AUTO: 49.7 FL (ref 37–54)
EOSINOPHIL # BLD AUTO: 0.13 10*3/MM3 (ref 0–0.7)
EOSINOPHIL NFR BLD AUTO: 1.3 % (ref 0.3–6.2)
ERYTHROCYTE [DISTWIDTH] IN BLOOD BY AUTOMATED COUNT: 16.1 % (ref 11.7–13)
GFR SERPL CREATININE-BSD FRML MDRD: 8 ML/MIN/1.73
GLUCOSE BLD-MCNC: 108 MG/DL (ref 65–99)
GLUCOSE BLDC GLUCOMTR-MCNC: 101 MG/DL (ref 70–130)
GLUCOSE BLDC GLUCOMTR-MCNC: 111 MG/DL (ref 70–130)
GLUCOSE BLDC GLUCOMTR-MCNC: 122 MG/DL (ref 70–130)
GLUCOSE BLDC GLUCOMTR-MCNC: 96 MG/DL (ref 70–130)
GRAM STN SPEC: ABNORMAL
GRAM STN SPEC: ABNORMAL
HBV CORE AB SER DONR QL IA: NEGATIVE
HCT VFR BLD AUTO: 34 % (ref 35.6–45.5)
HGB BLD-MCNC: 10.5 G/DL (ref 11.9–15.5)
IMM GRANULOCYTES # BLD: 0.18 10*3/MM3 (ref 0–0.03)
IMM GRANULOCYTES NFR BLD: 1.8 % (ref 0–0.5)
LYMPHOCYTES # BLD AUTO: 0.87 10*3/MM3 (ref 0.9–4.8)
LYMPHOCYTES NFR BLD AUTO: 8.5 % (ref 19.6–45.3)
MCH RBC QN AUTO: 26.4 PG (ref 26.9–32)
MCHC RBC AUTO-ENTMCNC: 30.9 G/DL (ref 32.4–36.3)
MCV RBC AUTO: 85.4 FL (ref 80.5–98.2)
MONOCYTES # BLD AUTO: 0.7 10*3/MM3 (ref 0.2–1.2)
MONOCYTES NFR BLD AUTO: 6.9 % (ref 5–12)
NEUTROPHILS # BLD AUTO: 8.29 10*3/MM3 (ref 1.9–8.1)
NEUTROPHILS NFR BLD AUTO: 81.3 % (ref 42.7–76)
PLATELET # BLD AUTO: 208 10*3/MM3 (ref 140–500)
PMV BLD AUTO: 9.9 FL (ref 6–12)
POTASSIUM BLD-SCNC: 4.3 MMOL/L (ref 3.5–5.2)
PROT SERPL-MCNC: 5.9 G/DL (ref 6–8.5)
RBC # BLD AUTO: 3.98 10*6/MM3 (ref 3.9–5.2)
SODIUM BLD-SCNC: 142 MMOL/L (ref 136–145)
VANCOMYCIN SERPL-MCNC: 25 MCG/ML (ref 5–40)
WBC NRBC COR # BLD: 10.19 10*3/MM3 (ref 4.5–10.7)

## 2017-03-14 PROCEDURE — 85025 COMPLETE CBC W/AUTO DIFF WBC: CPT | Performed by: INTERNAL MEDICINE

## 2017-03-14 PROCEDURE — 97110 THERAPEUTIC EXERCISES: CPT

## 2017-03-14 PROCEDURE — 99232 SBSQ HOSP IP/OBS MODERATE 35: CPT | Performed by: INTERNAL MEDICINE

## 2017-03-14 PROCEDURE — 80076 HEPATIC FUNCTION PANEL: CPT | Performed by: INTERNAL MEDICINE

## 2017-03-14 PROCEDURE — 92612 ENDOSCOPY SWALLOW (FEES) VID: CPT | Performed by: SPEECH-LANGUAGE PATHOLOGIST

## 2017-03-14 PROCEDURE — 99024 POSTOP FOLLOW-UP VISIT: CPT | Performed by: SURGERY

## 2017-03-14 PROCEDURE — 80202 ASSAY OF VANCOMYCIN: CPT | Performed by: INTERNAL MEDICINE

## 2017-03-14 PROCEDURE — 25010000002 HEPARIN (PORCINE) PER 1000 UNITS: Performed by: INTERNAL MEDICINE

## 2017-03-14 PROCEDURE — 25010000002 FENTANYL CITRATE (PF) 100 MCG/2ML SOLUTION: Performed by: INTERNAL MEDICINE

## 2017-03-14 PROCEDURE — 80048 BASIC METABOLIC PNL TOTAL CA: CPT | Performed by: INTERNAL MEDICINE

## 2017-03-14 PROCEDURE — 87493 C DIFF AMPLIFIED PROBE: CPT | Performed by: INTERNAL MEDICINE

## 2017-03-14 PROCEDURE — 82962 GLUCOSE BLOOD TEST: CPT

## 2017-03-14 PROCEDURE — 92526 ORAL FUNCTION THERAPY: CPT | Performed by: SPEECH-LANGUAGE PATHOLOGIST

## 2017-03-14 RX ADMIN — FENTANYL CITRATE 25 MCG: 50 INJECTION INTRAMUSCULAR; INTRAVENOUS at 02:35

## 2017-03-14 RX ADMIN — HEPARIN SODIUM 5000 UNITS: 5000 INJECTION, SOLUTION INTRAVENOUS; SUBCUTANEOUS at 16:10

## 2017-03-14 RX ADMIN — PANTOPRAZOLE SODIUM 40 MG: 40 INJECTION, POWDER, FOR SOLUTION INTRAVENOUS at 06:34

## 2017-03-14 RX ADMIN — FENTANYL CITRATE 50 MCG: 50 INJECTION INTRAMUSCULAR; INTRAVENOUS at 09:37

## 2017-03-14 RX ADMIN — FENTANYL CITRATE 50 MCG: 50 INJECTION INTRAMUSCULAR; INTRAVENOUS at 06:34

## 2017-03-14 RX ADMIN — HEPARIN SODIUM 5000 UNITS: 5000 INJECTION, SOLUTION INTRAVENOUS; SUBCUTANEOUS at 07:51

## 2017-03-14 RX ADMIN — FENTANYL CITRATE 25 MCG: 50 INJECTION INTRAMUSCULAR; INTRAVENOUS at 05:20

## 2017-03-14 RX ADMIN — HEPARIN SODIUM 5000 UNITS: 5000 INJECTION, SOLUTION INTRAVENOUS; SUBCUTANEOUS at 21:00

## 2017-03-14 RX ADMIN — SODIUM CHLORIDE 9 ML/HR: 9 INJECTION, SOLUTION INTRAVENOUS at 03:48

## 2017-03-14 NOTE — NURSING NOTE
03/14/17 1000   Wound 03/10/17 Right    Date first assessed: 03/10/17   Side: Right  Location: (c)   Additional Comments: surgical   Wound WDL WDL   Dressing Appearance moist drainage   Base reddened;clean;moist   Periwound Area pink   Edges open   Length (cm) 4   Width (cm) 10.5   Depth (cm) 2.5   Drainage Characteristics/Odor serosanguineous   Drainage Amount small   Wound Cleaning cleansed with;sterile normal saline   Wound Interventions (wound vac)   Dressing Dressing removed;Dressing applied;Dressing changed;foam;transparent film   Periwound Care barrier film applied   Therapy Setting (Negative Pressure Wound Therapy) continuous therapy   Pressure Setting (Negative Pressure Wound Therapy) 125 mmHg   Dressing (Negative Pressure Wound Therapy) foam, black;transparent dressing   Sponges Inserted (Negative Pressure Wound Therapy) 1   Wound 03/10/17 Left    Date first assessed: 03/10/17   Side: Left  Location: (c)   Additional Comments: surgical   Wound WDL WDL   Dressing Appearance moist drainage  (gauze/ abd dressings removed bilateral wounds)   Base reddened;moist;pink  (versitel applied wound bed)   Periwound Area pink   Edges open   Length (cm) 12   Width (cm) 3   Depth (cm) 4   Drainage Characteristics/Odor serosanguineous   Drainage Amount small   Wound Cleaning cleansed with;sterile normal saline   Wound Interventions (wound vac applied Y connector to both wounds)   Dressing Dressing changed;foam;transparent film   Periwound Care barrier film applied   Therapy Setting (Negative Pressure Wound Therapy) continuous therapy   Pressure Setting (Negative Pressure Wound Therapy) 125 mmHg   Dressing (Negative Pressure Wound Therapy) foam, black;transparent dressing   Sponges Inserted (Negative Pressure Wound Therapy) 1   Wound vac applied bilateral wounds: simplace foam. Linear wound abd fold; recommend apply moisture barrier cream

## 2017-03-14 NOTE — PROGRESS NOTES
LOS: 5 days     Chief Complaint:  Follow-up MRSA wound infection    Interval History:  No acute events. Wound vac is now in place. Being transferred out of CCU.    ROS: no n/v/d per discussion w/ RN    Vital Signs  Temp:  [97.6 °F (36.4 °C)-99.6 °F (37.6 °C)] 97.7 °F (36.5 °C)  Heart Rate:  [64-86] 80  Resp:  [18] 18  BP: (107-166)/(59-91) 141/69    Physical Exam:  General: In no acute distress  HEENT: Normocephalic, Oropharynx is clear and moist  Neck: Supple  Cardiovascular: Normal rate, regular rhythm,  no murmurs, rubs, or gallops   Respiratory: Lungs are clear to ascultation bilaterally, no wheezing  GI: Abdomen is soft, non-tender, non-distended  Musculoskeletal: Normal range of motion, no edema, tenderness or deformity  Skin: No rashes, wound vac in groin area  Neurological: Alert and oriented, moving all 4 extremities  Psychiatric: Normal mood and affect     Antibiotics:  •  Vancomycin Pharmacy Intermittent Dosing, , Does not apply, Daily, Aguilar Jenkins MD    LABS:  CBC, CMP, CK, VTr reviewed today  Lab Results   Component Value Date    WBC 10.19 03/14/2017    HGB 10.5 (L) 03/14/2017    HCT 34.0 (L) 03/14/2017    MCV 85.4 03/14/2017     03/14/2017     Lab Results   Component Value Date    GLUCOSE 108 (H) 03/14/2017    BUN 45 (H) 03/14/2017    CREATININE 5.69 (H) 03/14/2017    EGFRIFNONA 8 (L) 03/14/2017    BCR 7.9 03/14/2017    CO2 19.5 (L) 03/14/2017    CALCIUM 7.1 (L) 03/14/2017    ALBUMIN 2.80 (L) 03/14/2017    LABIL2 0.9 03/11/2017     (H) 03/14/2017     (H) 03/14/2017     Lab Results   Component Value Date    VANCORANDOM 25.00 03/14/2017     Lab Results   Component Value Date    CKTOTAL 51950 (H) 03/12/2017    TROPONINT 0.011 03/09/2017     Microbiology:  3/10 Wound: MRSA and coag-neg Staph  3/9 BCx: Strep mutans 1/1 sets  3/13 BCx: NGTD    Radiology (personally reviewed):   No new imaging today    Assessment/Plan   1.  Necrotizing soft tissue infection secondary to MRSA  -  status post debridement on March 10  - continue vancomycin with goal trough 15-20 for now but will likely be able to transition to oral therapy closer to time of discharge if wound is healing well  - noted high vanco level today  - wound vac now in place    2.  Blood culture positive for strep mutans - most likely a contaminant but unable to say definitively since only 1 blood culture was drawn on admission  - Repeat blood cultures ×2 on 3/13 are NGTD     3.  Acute kidney injury now on hemodialysis     4.  Severe rhabdomyolysis - most recent CK 11,999 down from >20,000     5.  Leukocytosis -resolved    Thank you for this consult. ID will follow. Dr Santiago will be back tomorrow.
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
## 2017-03-14 NOTE — PROGRESS NOTES
Acute Care - Physical Therapy Treatment Note  [facility]     Patient Name: Luca Amezcua  : 1955  MRN: [id number]  Today's Date: 3/14/2017  Onset of Illness/Injury or Date of Surgery Date: 17  Date of Referral to PT: 17  Referring Physician: Billie Salmeron    Admit Date: 3/9/2017    Visit Dx:    ICD-10-CM ICD-9-CM   1. Acute renal failure, unspecified acute renal failure type N17.9 584.9   2. Fall, initial encounter W19.XXXA E888.9   3. Sepsis, due to unspecified organism A41.9 038.9     995.91   4. Metabolic acidosis E87.2 276.2   5. Hypothermia, initial encounter T68.XXXA 991.6   6. Necrotizing fasciitis M72.6 728.86   7. Traumatic rhabdomyolysis, initial encounter T79.6XXA 958.6     Patient Active Problem List   Diagnosis   • Sepsis   • Renal failure, acute               Adult Rehabilitation Note       17 1550          Rehab Assessment/Intervention    Discipline physical therapist  -CH      Document Type therapy note (daily note)  -CH      Subjective Information agree to therapy  -CH      Patient Effort, Rehab Treatment good  -CH      Symptoms Noted During/After Treatment none  -CH      Precautions/Limitations fall precautions  -CH      Recorded by [CH] Katie Hagan, PT      Pain Assessment    Pain Assessment No/denies pain  -CH      Recorded by [CH] Katie Hagan, PT      Cognitive Assessment/Intervention    Current Cognitive/Communication Assessment impaired  -CH      Recorded by [CH] Katie Hagan, PT      Bed Mobility, Assessment/Treatment    Bed Mob, Supine to Sit, [place] verbal cues required;nonverbal cues required (demo/gesture);moderate assist (50% patient effort);2 person assist required  -CH      Bed Mob, Sit to Supine, [place] verbal cues required;nonverbal cues required (demo/gesture);moderate assist (50% patient effort);2 person assist required  -CH      Recorded by [CH] Katie Hagan, PT      Transfer Assessment/Treatment    Transfers,  Sit-Stand [place] verbal cues required;nonverbal cues required (demo/gesture);moderate assist (50% patient effort);2 person assist required  -CH      Transfers, Stand-Sit [place] verbal cues required;nonverbal cues required (demo/gesture);moderate assist (50% patient effort);2 person assist required  -CH      Transfer, Comment pt performed sit to stand x2  -CH      Recorded by [] Katie Hagan, PT      Gait Assessment/Treatment    Gait, [place] Level verbal cues required;nonverbal cues required (demo/gesture);moderate assist (50% patient effort);2 person assist required;hand held assist  -      Gait, Distance (Feet) --   4 sidesteps to HOB  -      Gait, Gait Deviations rafal decreased;step length decreased;stride length decreased  -      Gait, Comment pt having difficulty taking steps initially  -CH      Recorded by [WANDER] Katie Hagan, PT      Positioning and Restraints    Pre-Treatment Position in bed  -CH      Post Treatment Position bed  -CH      In Bed supine;call light within reach;encouraged to call for assist;exit alarm on  -      Recorded by [] Katie Hagan, PT        User Key  (r) = Recorded By, (t) = Taken By, (c) = Cosigned By    Initials Name Effective Dates     Katie Hagan, PT 12/01/15 -                 IP PT Goals       03/13/17 1158          Bed Mobility PT LTG    Bed Mobility PT LTG, Date Established 03/13/17  -MS      Bed Mobility PT LTG, Time to Achieve 5 - 7 days  -MS      Bed Mobility PT LTG, Activity Type all bed mobility  -MS      Bed Mobility PT LTG, [place] Level minimum assist (75% patient effort)  -MS      Transfer Training PT LTG    Transfer Training PT LTG, Date Established 03/13/17  -MS      Transfer Training PT LTG, Time to Achieve 5 - 7 days  -MS      Transfer Training PT LTG, Activity Type bed to chair /chair to bed;sit to stand/stand to sit  -MS      Transfer Training PT LTG, [place] Level minimum assist (75% patient effort)   -MS      Transfer Training PT LTG, Assist Device --   With A.A.D.  -MS      Gait Training PT LTG    Gait Training Goal PT LTG, Date Established 03/13/17  -MS      Gait Training Goal PT LTG, Time to Achieve 5 - 7 days  -MS      Gait Training Goal PT LTG, [place] Level minimum assist (75% patient effort)  -MS      Gait Training Goal PT LTG, Assist Device --   With A.A.D.  -MS      Gait Training Goal PT LTG, Distance to Achieve 100 feet  -MS      Dynamic Sitting Balance PT LTG    Dynamic Sitting Balance PT LTG, Date Established 03/13/17  -MS      Dynamic Sitting Balance PT LTG, Time to Achieve 5 - 7 days  -MS      Dynamic Sitting Balance PT LTG, [place] Level independent  -MS      Dynamic Sitting Balance PT LTG, Assist Device UE Support  -MS        User Key  (r) = Recorded By, (t) = Taken By, (c) = Cosigned By    Initials Name Provider Type    MS Siegelew FABIÁN Yee, PT Physical Therapist          Physical Therapy Education     Title: PT OT SLP Therapies (Active)     Topic: Physical Therapy (Active)     Point: Mobility training (Done)    Learning Progress Summary    Learner Readiness Method Response Comment Documented by Status   Patient Acceptance E,TB,D VU,NR   03/14/17 1655 Done    Acceptance E,D NR Pt. is Hard of hearing; Must speak louder and clearly for pt. to hear you. MS 03/13/17 1157 Active               Point: Home exercise program (Active)    Learning Progress Summary    Learner Readiness Method Response Comment Documented by Status   Patient Acceptance E,D NR Pt. is Hard of hearing; Must speak louder and clearly for pt. to hear you. MS 03/13/17 1157 Active               Point: Body mechanics (Done)    Learning Progress Summary    Learner Readiness Method Response Comment Documented by Status   Patient Acceptance E,TB,D VU,NR   03/14/17 1655 Done    Acceptance E,D NR Pt. is Hard of hearing; Must speak louder and clearly for pt. to hear you. MS 03/13/17 1157 Active               Point: Precautions  (Done)    Learning Progress Summary    Learner Readiness Method Response Comment Documented by Status   Patient Acceptance E,TB,D VU,NR   03/14/17 5742 Done    Acceptance E,D NR Pt. is Hard of hearing; Must speak louder and clearly for pt. to hear you. MS 03/13/17 1157 Active                      User Key     Initials Effective Dates Name Provider Type Discipline     12/01/15 -  Katie Hagan, PT Physical Therapist PT    MS 12/01/15 -  Abdiel Yee, PT Physical Therapist PT                    PT Recommendation and Plan  Anticipated Discharge Disposition: skilled nursing facility  Planned Therapy Interventions: balance training, bed mobility training, gait training, patient/family education, postural re-education, ROM (Range of Motion), strengthening, transfer training  PT Frequency: daily  Plan of Care Review  Plan Of Care Reviewed With: patient  Progress: improving  Outcome Summary/Follow up Plan: Pt demonstrates increased activity tolerance and functional strength as she was able to take some steps today and required less assistance.          Outcome Measures       03/14/17 1600 03/13/17 1200       How much help from another person do you currently need...    Turning from your back to your side while in flat bed without using bedrails? 2  -CH 2  -MS     Moving from lying on back to sitting on the side of a flat bed without bedrails? 2  -CH 2  -MS     Moving to and from a bed to a chair (including a wheelchair)? 2  -CH 1  -MS     Standing up from a chair using your arms (e.g., wheelchair, bedside chair)? 2  -CH 2  -MS     Climbing 3-5 steps with a railing? 1  -CH 1  -MS     To walk in hospital room? 1  -CH 1  -MS     AM-PAC 6 Clicks Score 10  - 9  -MS     Functional Assessment    Outcome Measure Options AM-PAC 6 Clicks Basic Mobility (PT)  -CH AM-PAC 6 Clicks Basic Mobility (PT)  -MS       User Key  (r) = Recorded By, (t) = Taken By, (c) = Cosigned By    Initials Name Provider Type     Katie FAJARDO  Danya, PT Physical Therapist    MS Abdiel LOCKWOOD Joselito, PT Physical Therapist           Time Calculation:         PT Charges       03/14/17 1657 03/14/17 1551       Time Calculation    Start Time 1533  -      Stop Time 1550  -      Time Calculation (min) 17 min  -      PT Received On 03/14/17  -      PT - Next Appointment  03/15/17  -       User Key  (r) = Recorded By, (t) = Taken By, (c) = Cosigned By    Initials Name Provider Type     Katie Hagan, PT Physical Therapist          Therapy Charges for Today     Code Description Service Date Service Provider Modifiers Qty    04344689676 HC PT THER PROC EA 15 MIN 3/14/2017 Katie Hagan, PT GP 1    03794683875 HC PT THER SUPP EA 15 MIN 3/14/2017 Katie Hagan, PT GP 1          PT G-Codes  Outcome Measure Options: AM-PAC 6 Clicks Basic Mobility (PT)    Katie Hagan, PT  3/14/2017

## 2017-03-14 NOTE — PLAN OF CARE
Problem: Patient Care Overview (Adult)  Goal: Plan of Care Review  Outcome: Ongoing (interventions implemented as appropriate)    03/14/17 6227   Coping/Psychosocial Response Interventions   Plan Of Care Reviewed With patient   Patient Care Overview   Progress improving   Outcome Evaluation   Outcome Summary/Follow up Plan Pt demonstrates increased activity tolerance and functional strength as she was able to take some steps today and required less assistance.

## 2017-03-14 NOTE — PLAN OF CARE
Problem: Patient Care Overview (Adult)  Goal: Plan of Care Review  Outcome: Ongoing (interventions implemented as appropriate)    03/14/17 1609   Coping/Psychosocial Response Interventions   Plan Of Care Reviewed With patient         Problem: Inpatient SLP  Goal: Dysphagia- Patient will safely consume diet as per recommendation with no signs/symptoms of aspiration  Outcome: Ongoing (interventions implemented as appropriate)    03/14/17 1609   Safely Consume Diet   Safely Consume Diet- SLP, Time to Achieve by discharge

## 2017-03-14 NOTE — PLAN OF CARE
Problem: Patient Care Overview (Adult)  Goal: Plan of Care Review  Outcome: Ongoing (interventions implemented as appropriate)    03/14/17 1552   Coping/Psychosocial Response Interventions   Plan Of Care Reviewed With patient         Problem: Inpatient SLP  Goal: Dysphagia- Patient will improve swallowing skills to begin to take some PO safely  Outcome: Ongoing (interventions implemented as appropriate)    03/14/17 1552   Begin to Take Some PO Safely   Begin to Take Some PO Safely- SLP, Time to Achieve by discharge   Begin to Take Some PO Safely- SLP, Outcome goal ongoing

## 2017-03-14 NOTE — PLAN OF CARE
Problem: Inpatient SLP  Goal: Dysphagia- Patient will improve swallowing skills to begin to take some PO safely  Outcome: Outcome(s) achieved Date Met:  03/14/17 03/14/17 1608   Begin to Take Some PO Safely   Begin to Take Some PO Safely- SLP, Time to Achieve by discharge   Begin to Take Some PO Safely- SLP, Outcome goal met

## 2017-03-14 NOTE — PROGRESS NOTES
Acute Care - Speech Language Pathology   Swallow Re-Evaluation [facility]     Patient Name: Luca Amezcua  : 1955  MRN: [id number]  Today's Date: 3/14/2017  Onset of Illness/Injury or Date of Surgery Date: 17            Admit Date: 3/9/2017    SPEECH-LANGUAGE PATHOLOGY EVALUATION - SWALLOW  Subjective: The patient was seen on this date for a Clinical Swallow evaluation.  Patient was alert and cooperative.    Objective: Textures given included thin liquid, honey thick liquid and puree consistency.  Assessment: Difficulties were noted with thin liquid, honey thick liquid and puree consistency, characterized by immediate cough response with ice chips and thin liquids and delayed cough with puree and honey thick liquids.  SLP Findings:  Patient presents with suspected oropharyngeal dysphagia, without esophageal component.   Recommendations: Diet Textures: NPO . Medications should be taken  by alternate means.   Recommended Strategies:  Oral car e PRN.  Other Recommended Evaluations: FEES.  Cortrak was unable to be placed due to resistance at GE junction.  Concerned view in VFSS would be suboptimal due to bilateral lines in neck.      Visit Dx:     ICD-10-CM ICD-9-CM   1. Acute renal failure, unspecified acute renal failure type N17.9 584.9   2. Fall, initial encounter W19.XXXA E888.9   3. Sepsis, due to unspecified organism A41.9 038.9     995.91   4. Metabolic acidosis E87.2 276.2   5. Hypothermia, initial encounter T68.XXXA 991.6   6. Necrotizing fasciitis M72.6 728.86   7. Traumatic rhabdomyolysis, initial encounter T79.6XXA 958.6     Patient Active Problem List   Diagnosis   • Sepsis   • Renal failure, acute     Past Medical History   Diagnosis Date   • Bleeding gastrointestinal    • Head trauma 1985     Past Surgical History   Procedure Laterality Date   • Incision and drainage leg Left 3/10/2017     Procedure: INCISION AND DRAINAGE BILATERAL GROIN;  Surgeon: Andrea Fried MD;  Location:   [facility];  Service:           SWALLOW EVALUATION (last 72 hours)      Swallow Evaluation       03/14/17 1100 03/13/17 0900             Rehab Evaluation    Document Type re-evaluation  -SA evaluation  -CP       Symptoms Noted During/After Treatment none  -SA        General Information    Patient Profile Review yes  -SA yes  -CP       Current Diet Limitations NPO  -SA NPO  -CP       Precautions/Limitations, Hearing  hearing impairment, bilaterally  -CP       Prior Level of Function- Swallowing no diet consistency restrictions  -SA no diet consistency restrictions  -CP       Plans/Goals Discussed With patient  -SA patient  -CP       Barriers to Rehab medically complex;hearing deficit  -SA cognitive status;hearing deficit  -CP       Clinical Impression    Patient's Goals For Discharge return to PO diet  -SA return to PO diet  -CP       SLP Swallowing Diagnosis oral dysfunction;pharyngeal dysfunction  -SA severe dysphagia;pharyngeal dysfunction  -CP       Rehab Potential/Prognosis, Swallowing good, to achieve stated therapy goals  -SA good, to achieve stated therapy goals  -CP       Criteria for Skilled Therapeutic Interventions Met skilled criteria for dysphagia intervention met  -SA skilled criteria for dysphagia intervention met  -CP       FCM, Swallowing  1-->Level 1  -CP       Therapy Frequency PRN  -SA PRN  -CP       Predicted Duration Therapy Interv (days) until discharge  -SA until discharge  -CP       Expected Duration Therapy Session (min)  15-30 minutes  -CP       SLP Diet Recommendation NPO: unsafe for food/liquid intake  -SA NPO: unsafe for food/liquid intake;temporary alternative means of nutrition/hydration  -CP       Recommended Diagnostics FEES  -SA reassess via clinical swallow (non-instrumental exam)  -CP       SLP Rec. for Method of Medication Administration meds via alternate route  -SA meds via alternate route  -CP       Anticipated Discharge Disposition  other (see comments)   unknown  -CP        Pain Assessment    Pain Assessment No/denies pain  -SA        Cognitive Assessment/Intervention    Current Cognitive/Communication Assessment impaired  -SA impaired   difficult to fully determine d/t significant [place]  -CP       Oral Motor Structure and Function    Oral Motor Anatomy and Physiology patient demonstrates anatomy that is WNL  -SA patient demonstrates anatomy and physiology that is WNL  -CP       Mucosal Quality  dry  -CP       Volitional Swallow  no difficulties initiating volitional swallow  -CP       Volitional Cough  weak volitional cough  -CP       Oral Motor Assessment Comment  Generalized global weakness  -CP         User Key  (r) = Recorded By, (t) = Taken By, (c) = Cosigned By    Initials Name Effective Dates    SA Rolanda Bethea MS CCC-SLP 04/13/15 -     CP Zamzam Miller, MS CCC-SLP 04/13/15 -         EDUCATION  The patient has been educated in the following areas:   Dysphagia (Swallowing Impairment).    SLP Recommendation and Plan  SLP Swallowing Diagnosis: oral dysfunction, pharyngeal dysfunction  SLP Diet Recommendation: NPO: unsafe for food/liquid intake     SLP Rec. for Method of Medication Administration: meds via alternate route     Recommended Diagnostics: FEES  Criteria for Skilled Therapeutic Interventions Met: skilled criteria for dysphagia intervention met     Rehab Potential/Prognosis, Swallowing: good, to achieve stated therapy goals  Therapy Frequency: PRN             Plan of Care Review  Plan Of Care Reviewed With: patient          IP SLP Goals       03/14/17 1552 03/13/17 0929       Begin to Take Some PO Safely    Begin to Take Some PO Safely- SLP, Date Established  03/13/17  -CP     Begin to Take Some PO Safely- SLP, Time to Achieve by discharge  -SA by discharge  -CP     Begin to Take Some PO Safely- SLP, Outcome goal ongoing  -SA        User Key  (r) = Recorded By, (t) = Taken By, (c) = Cosigned By    Initials Name Provider Type    SA Rolanda Bethea MS CCC-SLP Speech and  Language Pathologist    CP Zamzam Miller MS CCC-SLP Speech and Language Pathologist             SLP Outcome Measures (last 72 hours)      SLP Outcome Measures       03/14/17 1100 03/13/17 0900       SLP Outcome Measures    Outcome Measure Used? Adult NOMS  -SA Adult NOMS  -CP     FCM Scores    FCM Chosen  Swallowing  -CP     Swallowing FCM Score 1  -SA 1  -CP       User Key  (r) = Recorded By, (t) = Taken By, (c) = Cosigned By    Initials Name Effective Dates    SA Rolanda Bethea MS CCC-SLP 04/13/15 -     CP Zamzam Miller MS CCC-SLP 04/13/15 -            Time Calculation:         Time Calculation- SLP       03/14/17 1553          Time Calculation- SLP    SLP Received On 03/14/17  -        User Key  (r) = Recorded By, (t) = Taken By, (c) = Cosigned By    Initials Name Provider Type    SA Rolanda Bethea MS CCC-SLP Speech and Language Pathologist          Therapy Charges for Today     Code Description Service Date Service Provider Modifiers Qty    63378726430 HC ST TREATMENT SWALLOW 4 3/14/2017 Rolanda Bethea MS CCC-SLP GN 1               MS BABITA Boateng  3/14/2017

## 2017-03-14 NOTE — PROGRESS NOTES
"      [facility]         Dr Nicholas   LOS: 5 days   Patient Care Team:  No Known Provider as PCP - General    Chief Complaint:  Follow-up on rhabdomyolysis, renal failure, sepsis.  MRSA wound culture    Interval History:   Postoperative day 4 Status post debridement of the right bilateral groin area per surgery.  No necrotizing fasciitis noted.  Only Necrosis of the skin noted.  Continues to improve.  Slight Increase in urine output noted.   REVIEW OF SYSTEMS:   Hoarseness improving but denies any shortness of breath or cough.    Ventilator/Non-Invasive Ventilation Settings           none    Objective   Vital Signs  Temp:  [97.6 °F (36.4 °C)-99.6 °F (37.6 °C)] 97.7 °F (36.5 °C)  Heart Rate:  [64-86] 86  Resp:  [18] 18  BP: (107-166)/(59-91) 107/59    Intake/Output Summary (Last 24 hours) at 03/14/17 0831  Last data filed at 03/14/17 0645   Gross per 24 hour   Intake    232 ml   Output   1980 ml   Net  -1748 ml     Flowsheet Rows         First Filed Value    Admission Height  64\" (162.6 cm) Documented at 03/09/2017 1426    Admission Weight  250 lb (113 kg) Documented at 03/09/2017 1426          Physical Exam:   General Appearance:    awake no distress following simple commands..     Lungs:    diminished but equal breath sounds bilaterally.      Heart:    Regular rhythm and normal rate, normal S1 and S2, no            murmur, no gallop, no rub, no click   Chest Wall:    right breast skin appears to be slightly indurated and edematous.  No redness or discharge    Abdomen:     obese.  Soft.  There is post-debridement of bilateral groin wound.  Appears to be pink with no evidence of necrosis.     Neuro:   sedated intubated.  Following simple commands.     Extremities:   Moves all extremities well, +++ edema in legs and arms, no cyanosis, no             Redness          Results Review:          Results from last 7 days  Lab Units 03/14/17  0348 03/13/17  0431 03/12/17  0428   SODIUM mmol/L 142 140 145 "   POTASSIUM mmol/L 4.3 3.8 3.9   CHLORIDE mmol/L 97* 98 104   TOTAL CO2 mmol/L 19.5* 22.0 23.8   BUN mg/dL 45* 35* 48*   CREATININE mg/dL 5.69* 4.15* 5.22*   GLUCOSE mg/dL 108* 63* 106*   CALCIUM mg/dL 7.1* 6.8* 5.3*       Results from last 7 days  Lab Units 03/12/17  1022 03/11/17  1314 03/11/17  0358  03/09/17  1644   CK TOTAL U/L 95506* 94335* 88929*  < > >63107*   TROPONIN T ng/mL  --   --   --   --  0.011   < > = values in this interval not displayed.    Results from last 7 days  Lab Units 03/14/17  0348 03/13/17  0431 03/12/17  0428   WBC 10*3/mm3 10.19 11.15* 9.86   HEMOGLOBIN g/dL 10.5* 9.9* 9.2*   HEMATOCRIT % 34.0* 32.3* 29.7*   PLATELETS 10*3/mm3 208 200 194       Results from last 7 days  Lab Units 03/12/17  1022 03/09/17  1524   INR  1.23* 1.18*   APTT seconds  --  26.5         @[facility](bnp)@  I reviewed the patient's new clinical results.  I personally viewed and interpreted the patient's CXR        Medication Review:     heparin (porcine) 5,000 Units Subcutaneous Q8H   insulin aspart 0-9 Units Subcutaneous Q4H   pantoprazole 40 mg Intravenous QAM AC   Vancomycin Pharmacy Intermittent Dosing  Does not apply Daily         norepinephrine 0.02-0.3 mcg/kg/min Last Rate: 0.02 mcg/kg/min (03/12/17 0553)   Pharmacy to dose vancomycin     propofol 5-50 mcg/kg/min Last Rate: Stopped (03/12/17 0910)   sodium chloride 9 mL/hr Last Rate: 9 mL/hr (03/14/17 0348)       Assessment/Plan   Acute postoperative respiratory failure  1) Severe rhabdomyolysis  2) postoperative day 4 radical debridement of bilateral necrotic groin wounds with no evidence of fasciitis wound culture positive for MRSA  3) Oliguric acute renal failure, 2ary to #1 and dehydration  4) Third spacing  5) Lactic acidosis resolved  6) Sepsis, 2arty to #2  7) Metabolic acidosis   8) Morbid obesity  9) Protein calorie malnutrition (Alb=2.4)    Plan  Infectious diseases input noted.  Continue vancomycin per IDs recommendation  Swallow evaluation to be  repeated today  Dialysis per nephrology  Advance diet as tolerated  Physical therapy  Continue supportive care  Continue current care  Transfer out of the CCU today.    Billie Nicholas MD  03/14/17  8:31 AM

## 2017-03-14 NOTE — PROGRESS NOTES
Postoperative day #4 from debridement necrotic bilateral groin wounds    Wounds were examined with the wound care nurse today.  They look very clean.  No further need for debridement expected.  No cellulitis.  Wound VAC to be placed today.  Antibiotics per infectious disease service.  She is likely to require long-term wound care, and I think the VAC would be wise even when the patient is ready for discharge.    Andrea Fried MD  General and Endoscopic Surgery  [facility]    [address], [address]  [place], [place], [address]  P: [phone]  F: [phone]

## 2017-03-15 LAB
ANION GAP SERPL CALCULATED.3IONS-SCNC: 19 MMOL/L
BUN BLD-MCNC: 29 MG/DL (ref 8–23)
BUN/CREAT SERPL: 5.7 (ref 7–25)
C DIFF TOX GENS STL QL NAA+PROBE: NEGATIVE
CALCIUM SPEC-SCNC: 7.7 MG/DL (ref 8.6–10.5)
CHLORIDE SERPL-SCNC: 97 MMOL/L (ref 98–107)
CO2 SERPL-SCNC: 23 MMOL/L (ref 22–29)
CREAT BLD-MCNC: 5.06 MG/DL (ref 0.57–1)
DEPRECATED RDW RBC AUTO: 48.6 FL (ref 37–54)
ERYTHROCYTE [DISTWIDTH] IN BLOOD BY AUTOMATED COUNT: 15.9 % (ref 11.7–13)
GFR SERPL CREATININE-BSD FRML MDRD: 9 ML/MIN/1.73
GLUCOSE BLD-MCNC: 114 MG/DL (ref 65–99)
GLUCOSE BLDC GLUCOMTR-MCNC: 112 MG/DL (ref 70–130)
GLUCOSE BLDC GLUCOMTR-MCNC: 119 MG/DL (ref 70–130)
GLUCOSE BLDC GLUCOMTR-MCNC: 143 MG/DL (ref 70–130)
GLUCOSE BLDC GLUCOMTR-MCNC: 148 MG/DL (ref 70–130)
GLUCOSE BLDC GLUCOMTR-MCNC: 155 MG/DL (ref 70–130)
GLUCOSE BLDC GLUCOMTR-MCNC: 159 MG/DL (ref 70–130)
GLUCOSE BLDC GLUCOMTR-MCNC: 169 MG/DL (ref 70–130)
HCT VFR BLD AUTO: 33.8 % (ref 35.6–45.5)
HGB BLD-MCNC: 10.6 G/DL (ref 11.9–15.5)
MCH RBC QN AUTO: 26.3 PG (ref 26.9–32)
MCHC RBC AUTO-ENTMCNC: 31.4 G/DL (ref 32.4–36.3)
MCV RBC AUTO: 83.9 FL (ref 80.5–98.2)
PLATELET # BLD AUTO: 223 10*3/MM3 (ref 140–500)
PMV BLD AUTO: 10.1 FL (ref 6–12)
POTASSIUM BLD-SCNC: 3.6 MMOL/L (ref 3.5–5.2)
RBC # BLD AUTO: 4.03 10*6/MM3 (ref 3.9–5.2)
SODIUM BLD-SCNC: 139 MMOL/L (ref 136–145)
VANCOMYCIN SERPL-MCNC: 17.6 MCG/ML (ref 5–40)
WBC NRBC COR # BLD: 10.3 10*3/MM3 (ref 4.5–10.7)

## 2017-03-15 PROCEDURE — 85027 COMPLETE CBC AUTOMATED: CPT | Performed by: INTERNAL MEDICINE

## 2017-03-15 PROCEDURE — 25010000002 FENTANYL CITRATE (PF) 100 MCG/2ML SOLUTION: Performed by: INTERNAL MEDICINE

## 2017-03-15 PROCEDURE — 80202 ASSAY OF VANCOMYCIN: CPT | Performed by: INTERNAL MEDICINE

## 2017-03-15 PROCEDURE — 82962 GLUCOSE BLOOD TEST: CPT

## 2017-03-15 PROCEDURE — 80048 BASIC METABOLIC PNL TOTAL CA: CPT | Performed by: INTERNAL MEDICINE

## 2017-03-15 PROCEDURE — 99232 SBSQ HOSP IP/OBS MODERATE 35: CPT | Performed by: INTERNAL MEDICINE

## 2017-03-15 PROCEDURE — 25010000002 HEPARIN (PORCINE) PER 1000 UNITS: Performed by: INTERNAL MEDICINE

## 2017-03-15 PROCEDURE — 63710000001 INSULIN ASPART PER 5 UNITS: Performed by: INTERNAL MEDICINE

## 2017-03-15 RX ADMIN — HEPARIN SODIUM 5000 UNITS: 5000 INJECTION, SOLUTION INTRAVENOUS; SUBCUTANEOUS at 16:30

## 2017-03-15 RX ADMIN — FENTANYL CITRATE 50 MCG: 50 INJECTION INTRAMUSCULAR; INTRAVENOUS at 12:11

## 2017-03-15 RX ADMIN — HEPARIN SODIUM 5000 UNITS: 5000 INJECTION, SOLUTION INTRAVENOUS; SUBCUTANEOUS at 06:37

## 2017-03-15 RX ADMIN — INSULIN ASPART 2 UNITS: 100 INJECTION, SOLUTION INTRAVENOUS; SUBCUTANEOUS at 20:07

## 2017-03-15 RX ADMIN — SODIUM CHLORIDE 9 ML/HR: 9 INJECTION, SOLUTION INTRAVENOUS at 01:09

## 2017-03-15 RX ADMIN — HEPARIN SODIUM 5000 UNITS: 5000 INJECTION, SOLUTION INTRAVENOUS; SUBCUTANEOUS at 22:15

## 2017-03-15 RX ADMIN — PANTOPRAZOLE SODIUM 40 MG: 40 INJECTION, POWDER, FOR SOLUTION INTRAVENOUS at 06:37

## 2017-03-15 NOTE — PROGRESS NOTES
"      [facility]         Dr Nicholas   LOS: 6 days   Patient Care Team:  No Known Provider as PCP - General    Chief Complaint:  Follow-up on rhabdomyolysis, renal failure, sepsis.  MRSA wound culture    Interval History:   Postoperative day 5 Status post debridement of the right bilateral groin area per surgery.  No necrotizing fasciitis noted.  Only Necrosis of the skin noted.  Continues to improve.  Tolerating by mouth well.  REVIEW OF SYSTEMS:   Hoarseness improving but denies any shortness of breath or cough.    Ventilator/Non-Invasive Ventilation Settings           none    Objective   Vital Signs  Temp:  [97.4 °F (36.3 °C)-99.1 °F (37.3 °C)] 97.4 °F (36.3 °C)  Heart Rate:  [73-94] 84  BP: (105-157)/(63-87) 155/83    Intake/Output Summary (Last 24 hours) at 03/15/17 0916  Last data filed at 03/15/17 0632   Gross per 24 hour   Intake    468 ml   Output     10 ml   Net    458 ml     Flowsheet Rows         First Filed Value    Admission Height  64\" (162.6 cm) Documented at 03/09/2017 1426    Admission Weight  250 lb (113 kg) Documented at 03/09/2017 1426          Physical Exam:   General Appearance:    awake no distress following simple commands..     Lungs:    diminished but equal breath sounds bilaterally.      Heart:    Regular rhythm and normal rate, normal S1 and S2, no            murmur, no gallop, no rub, no click   Chest Wall:    right breast skin appears to be slightly indurated and edematous.  No redness or discharge    Abdomen:     obese.  Soft.  There is post-debridement of bilateral groin wound.  Appears to be pink with no evidence of necrosis.     Neuro:   sedated intubated.  Following simple commands.     Extremities:   Moves all extremities well, +++ edema in legs and arms, no cyanosis, no             Redness          Results Review:          Results from last 7 days  Lab Units 03/15/17  0328 03/14/17  0348 03/13/17  0431   SODIUM mmol/L 139 142 140   POTASSIUM mmol/L 3.6 4.3 3.8 "   CHLORIDE mmol/L 97* 97* 98   TOTAL CO2 mmol/L 23.0 19.5* 22.0   BUN mg/dL 29* 45* 35*   CREATININE mg/dL 5.06* 5.69* 4.15*   GLUCOSE mg/dL 114* 108* 63*   CALCIUM mg/dL 7.7* 7.1* 6.8*       Results from last 7 days  Lab Units 03/12/17  1022 03/11/17  1314 03/11/17  0358  03/09/17  1644   CK TOTAL U/L 55705* 42923* 06227*  < > >99491*   TROPONIN T ng/mL  --   --   --   --  0.011   < > = values in this interval not displayed.    Results from last 7 days  Lab Units 03/15/17  0328 03/14/17  0348 03/13/17  0431   WBC 10*3/mm3 10.30 10.19 11.15*   HEMOGLOBIN g/dL 10.6* 10.5* 9.9*   HEMATOCRIT % 33.8* 34.0* 32.3*   PLATELETS 10*3/mm3 223 208 200       Results from last 7 days  Lab Units 03/12/17  1022 03/09/17  1524   INR  1.23* 1.18*   APTT seconds  --  26.5         @[facility]@  I reviewed the patient's new clinical results.  I personally viewed and interpreted the patient's CXR        Medication Review:     heparin (porcine) 5,000 Units Subcutaneous Q8H   insulin aspart 0-9 Units Subcutaneous Q4H   pantoprazole 40 mg Intravenous QAM AC   Vancomycin Pharmacy Intermittent Dosing  Does not apply Daily         norepinephrine 0.02-0.3 mcg/kg/min Last Rate: 0.02 mcg/kg/min (03/12/17 0553)   Pharmacy to dose vancomycin     propofol 5-50 mcg/kg/min Last Rate: Stopped (03/12/17 0910)   sodium chloride 9 mL/hr Last Rate: 9 mL/hr (03/15/17 0109)       Assessment/Plan   Acute postoperative respiratory failure  1) Severe rhabdomyolysis  2) postoperative day 5 radical debridement of bilateral necrotic groin wounds with no evidence of fasciitis wound culture positive for MRSA  3) Oliguric acute renal failure, 2ary to #1 and dehydration  4) Third spacing  5) Lactic acidosis resolved  6) Sepsis, 2arty to #2  7) Metabolic acidosis   8) Morbid obesity  9) Protein calorie malnutrition (Alb=2.4)    Plan   Continue vancomycin per IDs recommendation  Tolerating oral diet well.  Dialysis per nephrology.  Plans for tunnel catheter per  nephrology.  Physical therapy  Discontinue central line after placing peripheral IV.  Continue supportive care  Continue current care  Awaiting Transfer out of the CCU today.    Billie Nicholas MD  03/15/17  9:16 AM

## 2017-03-15 NOTE — PROGRESS NOTES
Continued Stay Note  [facility]     Patient Name: Luca Amezcua  MRN: [id number]  Today's Date: 3/15/2017    Admit Date: 3/9/2017          Discharge Plan       03/15/17 8962    Case Management/Social Work Plan    Plan SNF.  Family and pt reviewing options.    Patient/Family In Agreement With Plan yes    Additional Comments Spoke with pt's brother and aunt at bedside.  Discussed pt's current needs and gave them another RtoR for review.  Currently pt is on HD and has no transport therefore will start referrals with [place] first and [facility] second.  Will continue to review and call with more options tomorrow.              Discharge Codes     None            Allison Shaffer RN

## 2017-03-15 NOTE — SIGNIFICANT NOTE
03/15/17 1512   Rehab Treatment   Discipline physical therapist   Treatment Not Performed patient/family decline treatment, pt not feeling well  (Pt asleep upon entry to room; once awakened, pt asked if PT could come back tomorrow. She states she is not feeling well. Will follow up.)   Recommendation   PT - Next Appointment 03/16/17
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
## 2017-03-15 NOTE — PROGRESS NOTES
LOS: 6 days     Chief Complaint:  MRSA wound infection    Interval History:  Afebrile, wound vac placed, no new complaints. Diarrhea improved. NO abd pain, no N/V. Decreased cough, no rash.     Vital Signs  Temp:  [97.4 °F (36.3 °C)-99.1 °F (37.3 °C)] 97.7 °F (36.5 °C)  Heart Rate:  [73-94] 89  BP: (105-160)/(65-87) 146/83    Physical Exam:  General: In no acute distress  HEENT: Oropharynx clear, moist mucous membranes  Cardiovascular: RRR, normal S1 and S2, no M/R/G  Respiratory: Clear to ascultation bilaterally, no wheezing  GI: Soft, NT/ND, + bowel sounds bilaterally, no masses  Skin: No rashes or lesions  Extremities: No edema, cyanosis    Antibiotics:  Vancomycin dosing per pharmacy     Results Review:     I reviewed the patient's new clinical results.  I reviewed the patient's new imaging results and agree with the interpretation.    Lab Results   Component Value Date    WBC 10.30 03/15/2017    HGB 10.6 (L) 03/15/2017    HCT 33.8 (L) 03/15/2017    MCV 83.9 03/15/2017     03/15/2017     Lab Results   Component Value Date    GLUCOSE 114 (H) 03/15/2017    BUN 29 (H) 03/15/2017    CREATININE 5.06 (H) 03/15/2017    EGFRIFNONA 9 (L) 03/15/2017    BCR 5.7 (L) 03/15/2017    CO2 23.0 03/15/2017    CALCIUM 7.7 (L) 03/15/2017    ALBUMIN 2.80 (L) 03/14/2017    LABIL2 0.9 03/11/2017     (H) 03/14/2017     (H) 03/14/2017       Microbiology:  3/14 C diff negative   3/13 BCx NGTD x 2  3/10 OR wound cx MRSA and CoNS           Fungal P  3/9 BCx Strep mutans x1     Assessment/Plan   This is a 61-year-old female who fell 2 days prior to admission in the shower and was unable to get up or get help for the last 2 days until being found by EMS. Physical exam revealed necrotizing wounds over her groin area. She underwent debridement on March 10 with necrosis seen of the subcutaneous tissue sparing the muscle. Operative cultures are growing MRSA.  Wounds at this point are looking well. The plan is for her wound  VAC placement. As long as the patient continues to do well clinically anticipate 10-14 days of IV treatment follow by oral therapy deemed necessary by surgery.     1.  Necrotizing soft tissue infection status post debridement on March 10  - Continue vancomycin day 5  - would anticipate switching to oral therapy at discharge  - Management per surgery     2.  Blood culture positive for strep mutans - most likely a contaminant but unable to determine that his only 1 blood culture was drawn on admission  - Repeat blood cultures ×2 are negative to date     3.  Acute kidney injury now on hemodialysis  - Management per nephrology     4.  Severe rhabdomyolysis improving     5.  Lactic acidosis resolved     6.  Leukocytosis resolved

## 2017-03-15 NOTE — PLAN OF CARE
Problem: Patient Care Overview (Adult)  Goal: Plan of Care Review  Outcome: Outcome(s) achieved Date Met:  03/15/17    03/15/17 0458   Coping/Psychosocial Response Interventions   Plan Of Care Reviewed With patient   Patient Care Overview   Progress no change   Outcome Evaluation   Outcome Summary/Follow up Plan pt completed HD 3/14/2017 short of 3L goal due to patency issues with the catheter. Tunnelled catheter to be placed soon for continued HD treatment? Minimal urine output noted from verduzco. Pt remains an overflow.          Problem: Sepsis (Adult)  Goal: Signs and Symptoms of Listed Potential Problems Will be Absent or Manageable (Sepsis)  Outcome: Ongoing (interventions implemented as appropriate)    Problem: Wound, Traumatic, Nonburn (Adult)  Goal: Signs and Symptoms of Listed Potential Problems Will be Absent or Manageable (Wound, Traumatic, Nonburn)  Outcome: Ongoing (interventions implemented as appropriate)    Problem: Fall Risk (Adult)  Goal: Identify Related Risk Factors and Signs and Symptoms  Outcome: Outcome(s) achieved Date Met:  03/15/17  Goal: Absence of Falls  Outcome: Ongoing (interventions implemented as appropriate)    Problem: Perioperative Period (Adult)  Goal: Signs and Symptoms of Listed Potential Problems Will be Absent or Manageable (Perioperative Period)  Outcome: Ongoing (interventions implemented as appropriate)

## 2017-03-15 NOTE — PROGRESS NOTES
Continued Stay Note  [facility]     Patient Name: Luca Amezcua  MRN: [id number]  Today's Date: 3/15/2017    Admit Date: 3/9/2017          Discharge Plan       03/15/17 1233    Case Management/Social Work Plan    Plan SNF.  Family and pt reviewing options.    Additional Comments Spoke with pt and then her brother by phone.  They are both in agreement with SNF at discharge but have not yet chosen a facility.  Will meet with them when pt's brother comes in today and go over choices.              Discharge Codes     None            Allison Shaffer RN

## 2017-03-15 NOTE — PROGRESS NOTES
"Pharmacokinetic Consult - Vancomycin Dosing (Follow-up Note)  Patient: Luca Amezcua  : 1955  MRN: [id number]  Admit date: 3/9/2017  2:33 PM    Day 7  Consult for Dr Jenkins  Treating: SSTI  Goal trough: 15-20 mcg/ml    Relevant clinical data and objective history reviewed:  61 y.o. female 64.02\" (162.6 cm) 221 lb 9 oz (101 kg)  Pt with necrotic wounds of abdomen/groin after being found down. No necrotizing fasciitis per surgery. S/p extensive debridement.    Past Medical History   Diagnosis Date   • Bleeding gastrointestinal    • Head trauma      CREATININE   Date Value Ref Range Status   03/15/2017 5.06 (H) 0.57 - 1.00 mg/dL Final   2017 5.69 (H) 0.57 - 1.00 mg/dL Final   2017 4.15 (H) 0.57 - 1.00 mg/dL Final     BUN   Date Value Ref Range Status   03/15/2017 29 (H) 8 - 23 mg/dL Final     Estimated Creatinine Clearance: 13.3 mL/min (by C-G formula based on Cr of 5.06).    Lab Results   Component Value Date    WBC 10.30 03/15/2017    WBC 10.19 2017    WBC 11.15 (H) 2017     Temp Readings from Last 3 Encounters:   03/15/17 97.4 °F (36.3 °C) (Oral)     Baseline cultures/labs/radiology:  3/10 OR wound cx MRSA  Fungal P  3/9 BCx Strep mutans x1   3/13 blood cx: NGTD    IV Anti-Infectives     Ordered     Dose/Rate Route Frequency Start Stop    17  Vancomycin Pharmacy Intermittent Dosing     Ordering Provider:  Aguilar Jenkins MD     Does not apply Daily 17  Pharmacy to dose vancomycin     Ordering Provider:  Aguilar Jenkins MD     Does not apply Continuous PRN 17           Vancomycin dosing history:   3/9: 2250mg IV x1 @ 1759  3/10 random: 24.6 mcg/ml, given 500mg IV x1 @ 1700  3/11 0529 Vancomycin random level = 27.6 mcg/ml  3/12 428 Random level = 22.1, no dose given, received HD x1  3/13 0431 random level= 17.2 mcg/ml, 500mg IV x1 @ 1021  3/14 random: 25 mcg/ml, no dose given, HD x 4 hr  3/15 random: 17.2 " mcg/ml    Assessment/Plan  1. Vancomycin random level this AM is within desired range of 15-20 mcg/ml. Continue to dose intermittently for now. Pt still oliguric/anuric this AM, expect only ~10% non-renal clearance of vancomycin overnight. No dose today as expect level to be around 15 mcg/ml tomorrow. Will plan to give dose tomorrow.  2. No level needed for now.  Pharmacy will continue to follow daily while on vancomycin and adjust as needed.     Thank you,  Ada Lee, PharmD, BCPS  03/15/17 9:05 AM

## 2017-03-16 ENCOUNTER — ANESTHESIA EVENT (OUTPATIENT)
Dept: PERIOP | Facility: HOSPITAL | Age: 62
End: 2017-03-16

## 2017-03-16 ENCOUNTER — ANESTHESIA (OUTPATIENT)
Dept: PERIOP | Facility: HOSPITAL | Age: 62
End: 2017-03-16

## 2017-03-16 ENCOUNTER — APPOINTMENT (OUTPATIENT)
Dept: GENERAL RADIOLOGY | Facility: HOSPITAL | Age: 62
End: 2017-03-16

## 2017-03-16 LAB
ANION GAP SERPL CALCULATED.3IONS-SCNC: 17.8 MMOL/L
BUN BLD-MCNC: 39 MG/DL (ref 8–23)
BUN/CREAT SERPL: 6.1 (ref 7–25)
CALCIUM SPEC-SCNC: 7.2 MG/DL (ref 8.6–10.5)
CHLORIDE SERPL-SCNC: 100 MMOL/L (ref 98–107)
CO2 SERPL-SCNC: 22.2 MMOL/L (ref 22–29)
CREAT BLD-MCNC: 6.44 MG/DL (ref 0.57–1)
DEPRECATED RDW RBC AUTO: 48.8 FL (ref 37–54)
ERYTHROCYTE [DISTWIDTH] IN BLOOD BY AUTOMATED COUNT: 16 % (ref 11.7–13)
GFR SERPL CREATININE-BSD FRML MDRD: 7 ML/MIN/1.73
GLUCOSE BLD-MCNC: 98 MG/DL (ref 65–99)
GLUCOSE BLDC GLUCOMTR-MCNC: 103 MG/DL (ref 70–130)
GLUCOSE BLDC GLUCOMTR-MCNC: 116 MG/DL (ref 70–130)
GLUCOSE BLDC GLUCOMTR-MCNC: 75 MG/DL (ref 70–130)
GLUCOSE BLDC GLUCOMTR-MCNC: 91 MG/DL (ref 70–130)
HCT VFR BLD AUTO: 32.2 % (ref 35.6–45.5)
HGB BLD-MCNC: 10.2 G/DL (ref 11.9–15.5)
MCH RBC QN AUTO: 26.7 PG (ref 26.9–32)
MCHC RBC AUTO-ENTMCNC: 31.7 G/DL (ref 32.4–36.3)
MCV RBC AUTO: 84.3 FL (ref 80.5–98.2)
PLATELET # BLD AUTO: 224 10*3/MM3 (ref 140–500)
PMV BLD AUTO: 10.1 FL (ref 6–12)
POTASSIUM BLD-SCNC: 3.7 MMOL/L (ref 3.5–5.2)
RBC # BLD AUTO: 3.82 10*6/MM3 (ref 3.9–5.2)
SODIUM BLD-SCNC: 140 MMOL/L (ref 136–145)
WBC NRBC COR # BLD: 11.5 10*3/MM3 (ref 4.5–10.7)

## 2017-03-16 PROCEDURE — 80048 BASIC METABOLIC PNL TOTAL CA: CPT | Performed by: INTERNAL MEDICINE

## 2017-03-16 PROCEDURE — 25010000002 HEPARIN (PORCINE) PER 1000 UNITS: Performed by: SURGERY

## 2017-03-16 PROCEDURE — 25010000003 CEFAZOLIN IN DEXTROSE 2-4 GM/100ML-% SOLUTION: Performed by: SURGERY

## 2017-03-16 PROCEDURE — 25010000002 MIDAZOLAM PER 1 MG: Performed by: ANESTHESIOLOGY

## 2017-03-16 PROCEDURE — 05HM33Z INSERTION OF INFUSION DEVICE INTO RIGHT INTERNAL JUGULAR VEIN, PERCUTANEOUS APPROACH: ICD-10-PCS | Performed by: SURGERY

## 2017-03-16 PROCEDURE — 77001 FLUOROGUIDE FOR VEIN DEVICE: CPT

## 2017-03-16 PROCEDURE — 5A1D60Z PERFORMANCE OF URINARY FILTRATION, MULTIPLE: ICD-10-PCS | Performed by: INTERNAL MEDICINE

## 2017-03-16 PROCEDURE — 82962 GLUCOSE BLOOD TEST: CPT

## 2017-03-16 PROCEDURE — 85027 COMPLETE CBC AUTOMATED: CPT | Performed by: INTERNAL MEDICINE

## 2017-03-16 PROCEDURE — 25010000002 HEPARIN (PORCINE) PER 1000 UNITS: Performed by: INTERNAL MEDICINE

## 2017-03-16 PROCEDURE — 25010000002 FENTANYL CITRATE (PF) 100 MCG/2ML SOLUTION: Performed by: INTERNAL MEDICINE

## 2017-03-16 PROCEDURE — C1750 CATH, HEMODIALYSIS,LONG-TERM: HCPCS | Performed by: SURGERY

## 2017-03-16 PROCEDURE — 63710000001 INSULIN ASPART PER 5 UNITS: Performed by: INTERNAL MEDICINE

## 2017-03-16 RX ORDER — NITROGLYCERIN 0.4 MG/1
0.4 TABLET SUBLINGUAL
Status: DISCONTINUED | OUTPATIENT
Start: 2017-03-16 | End: 2017-03-21 | Stop reason: HOSPADM

## 2017-03-16 RX ORDER — CEFAZOLIN SODIUM 2 G/100ML
2 INJECTION, SOLUTION INTRAVENOUS ONCE
Status: COMPLETED | OUTPATIENT
Start: 2017-03-16 | End: 2017-03-16

## 2017-03-16 RX ORDER — SODIUM CHLORIDE, SODIUM LACTATE, POTASSIUM CHLORIDE, CALCIUM CHLORIDE 600; 310; 30; 20 MG/100ML; MG/100ML; MG/100ML; MG/100ML
9 INJECTION, SOLUTION INTRAVENOUS CONTINUOUS
Status: DISCONTINUED | OUTPATIENT
Start: 2017-03-16 | End: 2017-03-16

## 2017-03-16 RX ORDER — SODIUM CHLORIDE 0.9 % (FLUSH) 0.9 %
1-10 SYRINGE (ML) INJECTION AS NEEDED
Status: DISCONTINUED | OUTPATIENT
Start: 2017-03-16 | End: 2017-03-16 | Stop reason: HOSPADM

## 2017-03-16 RX ORDER — HYDROCODONE BITARTRATE AND ACETAMINOPHEN 5; 325 MG/1; MG/1
1 TABLET ORAL EVERY 4 HOURS PRN
Status: DISCONTINUED | OUTPATIENT
Start: 2017-03-16 | End: 2017-03-21 | Stop reason: HOSPADM

## 2017-03-16 RX ORDER — MIDAZOLAM HYDROCHLORIDE 1 MG/ML
2 INJECTION INTRAMUSCULAR; INTRAVENOUS
Status: DISCONTINUED | OUTPATIENT
Start: 2017-03-16 | End: 2017-03-16 | Stop reason: HOSPADM

## 2017-03-16 RX ORDER — MAGNESIUM HYDROXIDE 1200 MG/15ML
LIQUID ORAL AS NEEDED
Status: DISCONTINUED | OUTPATIENT
Start: 2017-03-16 | End: 2017-03-16 | Stop reason: HOSPADM

## 2017-03-16 RX ORDER — FENTANYL CITRATE 50 UG/ML
50 INJECTION, SOLUTION INTRAMUSCULAR; INTRAVENOUS
Status: DISCONTINUED | OUTPATIENT
Start: 2017-03-16 | End: 2017-03-16 | Stop reason: HOSPADM

## 2017-03-16 RX ORDER — FAMOTIDINE 10 MG/ML
20 INJECTION, SOLUTION INTRAVENOUS ONCE
Status: COMPLETED | OUTPATIENT
Start: 2017-03-16 | End: 2017-03-16

## 2017-03-16 RX ORDER — HEPARIN SODIUM 1000 [USP'U]/ML
INJECTION, SOLUTION INTRAVENOUS; SUBCUTANEOUS AS NEEDED
Status: DISCONTINUED | OUTPATIENT
Start: 2017-03-16 | End: 2017-03-16 | Stop reason: HOSPADM

## 2017-03-16 RX ORDER — MIDAZOLAM HYDROCHLORIDE 1 MG/ML
1 INJECTION INTRAMUSCULAR; INTRAVENOUS
Status: DISCONTINUED | OUTPATIENT
Start: 2017-03-16 | End: 2017-03-16 | Stop reason: HOSPADM

## 2017-03-16 RX ADMIN — MIDAZOLAM HYDROCHLORIDE 1 MG: 1 INJECTION, SOLUTION INTRAMUSCULAR; INTRAVENOUS at 09:52

## 2017-03-16 RX ADMIN — MIDAZOLAM HYDROCHLORIDE 1 MG: 1 INJECTION, SOLUTION INTRAMUSCULAR; INTRAVENOUS at 09:29

## 2017-03-16 RX ADMIN — INSULIN ASPART 2 UNITS: 100 INJECTION, SOLUTION INTRAVENOUS; SUBCUTANEOUS at 00:15

## 2017-03-16 RX ADMIN — MIDAZOLAM HYDROCHLORIDE 1 MG: 1 INJECTION, SOLUTION INTRAMUSCULAR; INTRAVENOUS at 09:57

## 2017-03-16 RX ADMIN — SODIUM CHLORIDE 9 ML/HR: 9 INJECTION, SOLUTION INTRAVENOUS at 18:54

## 2017-03-16 RX ADMIN — MIDAZOLAM HYDROCHLORIDE 2 MG: 1 INJECTION, SOLUTION INTRAMUSCULAR; INTRAVENOUS at 09:45

## 2017-03-16 RX ADMIN — MIDAZOLAM HYDROCHLORIDE 1 MG: 1 INJECTION, SOLUTION INTRAMUSCULAR; INTRAVENOUS at 09:55

## 2017-03-16 RX ADMIN — HEPARIN SODIUM 5000 UNITS: 5000 INJECTION, SOLUTION INTRAVENOUS; SUBCUTANEOUS at 21:02

## 2017-03-16 RX ADMIN — CEFAZOLIN SODIUM 2 G: 2 INJECTION, SOLUTION INTRAVENOUS at 09:57

## 2017-03-16 RX ADMIN — VANCOMYCIN HYDROCHLORIDE 750 MG: 750 INJECTION, POWDER, LYOPHILIZED, FOR SOLUTION INTRAVENOUS at 15:15

## 2017-03-16 RX ADMIN — FAMOTIDINE 20 MG: 10 INJECTION, SOLUTION INTRAVENOUS at 09:29

## 2017-03-16 RX ADMIN — FENTANYL CITRATE 25 MCG: 50 INJECTION INTRAMUSCULAR; INTRAVENOUS at 09:49

## 2017-03-16 RX ADMIN — HEPARIN SODIUM 3800 UNITS: 1000 INJECTION, SOLUTION INTRAVENOUS; SUBCUTANEOUS at 16:30

## 2017-03-16 RX ADMIN — HEPARIN SODIUM 5000 UNITS: 5000 INJECTION, SOLUTION INTRAVENOUS; SUBCUTANEOUS at 06:00
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
## 2017-03-16 NOTE — PLAN OF CARE
Problem: Patient Care Overview (Adult)  Goal: Plan of Care Review    03/16/17 0326   Coping/Psychosocial Response Interventions   Plan Of Care Reviewed With patient   Patient Care Overview   Progress no change   Outcome Evaluation   Outcome Summary/Follow up Plan pt stable throughout night. To get tunnelled cath placed today with possible dialysis.        Goal: Adult Individualization and Mutuality  Outcome: Ongoing (interventions implemented as appropriate)    Problem: Sepsis (Adult)  Goal: Signs and Symptoms of Listed Potential Problems Will be Absent or Manageable (Sepsis)  Outcome: Ongoing (interventions implemented as appropriate)    Problem: Wound, Traumatic, Nonburn (Adult)  Goal: Signs and Symptoms of Listed Potential Problems Will be Absent or Manageable (Wound, Traumatic, Nonburn)  Outcome: Ongoing (interventions implemented as appropriate)    Problem: Fall Risk (Adult)  Goal: Absence of Falls  Outcome: Outcome(s) achieved Date Met:  03/16/17    Problem: Perioperative Period (Adult)  Goal: Signs and Symptoms of Listed Potential Problems Will be Absent or Manageable (Perioperative Period)  Outcome: Ongoing (interventions implemented as appropriate)    Problem: Urine Elimination, Impaired (Adult)  Goal: Identify Related Risk Factors and Signs and Symptoms  Outcome: Outcome(s) achieved Date Met:  03/16/17  Goal: Effective Urinary Elimination  Outcome: Ongoing (interventions implemented as appropriate)  Goal: Effective Containment of Urine  Outcome: Outcome(s) achieved Date Met:  03/16/17  Goal: Reduced Incontinence Episodes  Outcome: Ongoing (interventions implemented as appropriate)

## 2017-03-16 NOTE — ANESTHESIA POSTPROCEDURE EVALUATION
Patient: Luca Short    Procedure Summary     Date Anesthesia Start Anesthesia Stop Room / Location    03/16/17 0945 1032 [facility] / [facility]       Procedure Diagnosis Surgeon Provider    PALINDROME INSERTION (N/A ) No diagnosis on file. MD Satish Harris Jr., MD          Anesthesia Type: MAC  Last vitals  BP      Temp      Pulse     Resp      SpO2        Post Anesthesia Care and Evaluation      Comments: The patient was discharged before being seen by an Anesthesiologist.  No apparent anesthetic complications were noted per the record.    THIS CASE IS NOT MEDICALLY DIRECTED.

## 2017-03-16 NOTE — SIGNIFICANT NOTE
03/16/17 1144   Rehab Treatment   Discipline physical therapist   Treatment Not Performed other (see comments)  (pt had sx this AM for removal of R Shiley catheter & placement of R IJ palidrome catheter. and very sleepy at this time; HD this afternoon; will check tomorrow)   Recommendation   PT - Next Appointment 03/17/17

## 2017-03-16 NOTE — PROGRESS NOTES
Continued Stay Note  [facility]     Patient Name: Luca Amezcua  MRN: [id number]  Today's Date: 3/16/2017    Admit Date: 3/9/2017          Discharge Plan       03/16/17 1705    Case Management/Social Work Plan    Plan Undetermined    Patient/Family In Agreement With Plan yes    Additional Comments Per [facility], due to no secondary insurance and pt's expected length of rehab, they cannot accept at this time.  [facility] is still following but not taking pt's right now.  Call to brother to discuss possible [facility] referral due to pt's heavy needs.  No answer.  Pt off floor in HD and will then transfer to Rm 601.  CCP will f/u with pt/brother for possible [facility] referral.              Discharge Codes     None            Allison Shaffer RN

## 2017-03-16 NOTE — PROGRESS NOTES
"      [facility]         Dr Nicholas   LOS: 7 days   Patient Care Team:  No Known Provider as PCP - General    Chief Complaint:  Follow-up on rhabdomyolysis, renal failure, sepsis.  MRSA wound culture    Interval History:   Postoperative day 6 Status post debridement of the right bilateral groin area per surgery.  No necrotizing fasciitis noted.  Only Necrosis of the skin noted.  Continues to improve.  Status post placement off palindrome catheter.  Sleepy and tired post catheter placement.  REVIEW OF SYSTEMS:   Hoarseness improving but denies any shortness of breath or cough.    Ventilator/Non-Invasive Ventilation Settings           none    Objective   Vital Signs  Temp:  [97.3 °F (36.3 °C)-98.6 °F (37 °C)] 97.5 °F (36.4 °C)  Heart Rate:  [78-93] 78  Resp:  [12-18] 12  BP: (131-167)/(79-96) 136/86    Intake/Output Summary (Last 24 hours) at 03/16/17 1108  Last data filed at 03/16/17 1015   Gross per 24 hour   Intake    436 ml   Output     55 ml   Net    381 ml     Flowsheet Rows         First Filed Value    Admission Height  64\" (162.6 cm) Documented at 03/09/2017 1426    Admission Weight  250 lb (113 kg) Documented at 03/09/2017 1426          Physical Exam:   General Appearance:    awake no distress following simple commands..     Lungs:    diminished but equal breath sounds bilaterally.      Heart:    Regular rhythm and normal rate, normal S1 and S2, no            murmur, no gallop, no rub, no click   Chest Wall:    right breast skin appears to be slightly indurated and edematous.  No redness or discharge    Abdomen:     obese.  Soft.  There is post-debridement of bilateral groin wound.  Appears to be pink with no evidence of necrosis.     Neuro:   sedated intubated.  Following simple commands.     Extremities:   Moves all extremities well, +++ edema in legs and arms, no cyanosis, no             Redness          Results Review:          Results from last 7 days  Lab Units 03/16/17  0401 " 03/15/17  0328 03/14/17  0348   SODIUM mmol/L 140 139 142   POTASSIUM mmol/L 3.7 3.6 4.3   CHLORIDE mmol/L 100 97* 97*   TOTAL CO2 mmol/L 22.2 23.0 19.5*   BUN mg/dL 39* 29* 45*   CREATININE mg/dL 6.44* 5.06* 5.69*   GLUCOSE mg/dL 98 114* 108*   CALCIUM mg/dL 7.2* 7.7* 7.1*       Results from last 7 days  Lab Units 03/12/17  1022 03/11/17  1314 03/11/17  0358  03/09/17  1644   CK TOTAL U/L 95926* 72126* 05700*  < > >23287*   TROPONIN T ng/mL  --   --   --   --  0.011   < > = values in this interval not displayed.    Results from last 7 days  Lab Units 03/16/17  0401 03/15/17  0328 03/14/17  0348   WBC 10*3/mm3 11.50* 10.30 10.19   HEMOGLOBIN g/dL 10.2* 10.6* 10.5*   HEMATOCRIT % 32.2* 33.8* 34.0*   PLATELETS 10*3/mm3 224 223 208       Results from last 7 days  Lab Units 03/12/17  1022 03/09/17  1524   INR  1.23* 1.18*   APTT seconds  --  26.5         @LABNT(bnp)@  I reviewed the patient's new clinical results.  I personally viewed and interpreted the patient's CXR        Medication Review:     [MAR Hold] heparin (porcine) 5,000 Units Subcutaneous Q8H   [MAR Hold] insulin aspart 0-9 Units Subcutaneous Q4H   vancomycin 750 mg Intravenous Once   [MAR Hold] Vancomycin Pharmacy Intermittent Dosing  Does not apply Daily         lactated ringers 9 mL/hr    Pharmacy to dose vancomycin     sodium chloride 9 mL/hr Last Rate: 9 mL/hr (03/15/17 0109)       Assessment/Plan   Acute postoperative respiratory failure  1) Severe rhabdomyolysis  2) postoperative day 5 radical debridement of bilateral necrotic groin wounds with no evidence of fasciitis wound culture positive for MRSA  3) Oliguric acute renal failure, 2ary to #1 and dehydration  4) Third spacing  5) Lactic acidosis resolved  6) Sepsis, 2arty to #2  7) Metabolic acidosis   8) Morbid obesity  9) Protein calorie malnutrition (Alb=2.4)    Plan   Continue vancomycin per IDs recommendation  Tolerating oral diet well.  Dialysis per nephrology.  Palindrome catheter placed  today.  Physical therapy  Discontinued central line after placing peripheral IV.  Continue supportive care  Continue current care  Awaiting Transfer out of the CCU today.    Billie Nicholas MD  03/16/17  11:08 AM

## 2017-03-16 NOTE — OP NOTE
Preoperative diagnosis:  end-stage renal disease in need of longer term access.    Postoperative diagnosis: Same    Operative procedure: Duplex ultrasound guided access of the right internal jugular vein with placement of right internal jugular palindrome catheter with fluoroscopy; removal of temporary Shiley dialysis catheter    Surgeon: Kendall Snow Jr, M.D.    Asst.: MARGO Manning    Anesthesia: Local with sedation    Estimated blood loss: Less than 10 cc    Complications: None    Description of procedure: The patient was placed on the operating table in the supine position.  The patient's right anterior neck and chest were prepped using ChloraPrep and draped in usual sterile fashion.  Using duplex ultrasound the right internal jugular vein was identified at the base of the neck.  Local anesthetic was infiltrated into the skin and subcutaneous tissues tissue and the vein accessed percutaneously under direct vision.  The guidewire was advanced and guidewire position was confirmed to be in the superior vena cava into the right atrium by fluoroscopy.  More local anesthetic was infiltrated into the skin and subcutaneous tissue inferior and lateral to the clavicle.  A small stab incision was made and the 19 cm long palindrome catheter was placed through a simultaneous tunnel from below the clavicle to the guidewire exit site.  Sequential dilators were then placed over the guidewire followed by the dilator and sheath.  The dilator and guidewire were removed and the catheter placed through the sheath.  The sheath was removed.  Fluoroscopy confirmed satisfactory positioning of the catheter tip in the superior vena cava near the right atrium.  There was no pneumothorax.  No kinking of the catheter.  Of note, the Shiley catheter which was inserted more cephalad to this was removed and pressure held prior to fluoroscopy.  The catheter was secured to the skin with 3-0 Vicryl suture at the exit site.  The neck incision  was closed in 2 layers with a running 3-0 Vicryl suture in the subcutaneous tissue and then in a subcuticular fashion.  The hub of this catheter was secured to the skin with 2-0 nylon.  Biopatch and sterile dressings were applied.  The neck incision was covered with Dermabond.  Sponge needle and instrument counts were reported as correct.  The patient was then transported back to the intensive care unit in satisfactory condition.

## 2017-03-16 NOTE — ANESTHESIA PREPROCEDURE EVALUATION
Anesthesia Evaluation     Patient summary reviewed and Nursing notes reviewed   no history of anesthetic complications:  NPO Status: > 8 hours   Airway   Mallampati: III  TM distance: >3 FB  Neck ROM: full  possible difficult intubation  Dental    (+) poor dentition    Pulmonary - negative pulmonary ROS and normal exam   Cardiovascular - negative cardio ROS and normal exam  Exercise tolerance: good (4-7 METS)    ECG reviewed  Rhythm: regular  Rate: normal        Neuro/Psych- negative ROS    ROS Comment: Pt w/ known hx/o CHI/TBI.  Lives independently currently.  Answers appropriately, somewhat somnolent this morning.  GI/Hepatic/Renal/Endo    (+) morbid obesity, chronic renal disease ESRD and dialysis,     Musculoskeletal (-) negative ROS    Abdominal  - normal exam   Substance History - negative use     OB/GYN          Other - negative ROS           Phys Exam Other: Small mouth                          Anesthesia Plan    ASA 4     MAC     intravenous induction   Anesthetic plan and risks discussed with patient.    Plan discussed with CRNA and attending.

## 2017-03-16 NOTE — SIGNIFICANT NOTE
03/16/17 0921   Rehab Treatment   Discipline speech language pathologist   Rehab Evaluation   Evaluation Not Performed patient unavailable for evaluation  (Pt in OR)   Recommendations   SLP - Next Appointment (this pm or 3/17)

## 2017-03-16 NOTE — PROGRESS NOTES
"Pharmacokinetic Consult - Vancomycin Dosing (Follow-up Note)  Patient: Luca Amezcua  : 1955  MRN: [id number]  Admit date: 3/9/2017  2:33 PM    Day 8  Consult for Dr Jenkins  Treating: SSTI  Goal pre-HD level: 15-20 mcg/ml    Relevant clinical data and objective history reviewed:  61 y.o. female 64.02\" (162.6 cm) 222 lb 7.1 oz (101 kg)  Pt with necrotic wounds of abdomen/groin after being found down. No necrotizing fasciitis per surgery. S/p extensive debridement. To OR 3/16 for tunneled cath placement for HD. Plan switch to oral therapy at discharge.    Past Medical History   Diagnosis Date   • Bleeding gastrointestinal    • Head trauma      CREATININE   Date Value Ref Range Status   2017 6.44 (H) 0.57 - 1.00 mg/dL Final   03/15/2017 5.06 (H) 0.57 - 1.00 mg/dL Final   2017 5.69 (H) 0.57 - 1.00 mg/dL Final     BUN   Date Value Ref Range Status   2017 39 (H) 8 - 23 mg/dL Final     SHASTA, requiring HD    Lab Results   Component Value Date    WBC 11.50 (H) 2017    WBC 10.30 03/15/2017    WBC 10.19 2017     Temp Readings from Last 3 Encounters:   17 97.5 °F (36.4 °C) (Oral)     Baseline cultures/labs/radiology:  3/14 C diff negative   3/13 BCx NGTD x 2  3/10 OR wound cx MRSA  3/9 BCx Strep mutans x1     IV Anti-Infectives     Ordered     Dose/Rate Route Frequency Start Stop    17 0833  ceFAZolin in dextrose (ANCEF) IVPB solution 2 g     Ordering Provider:  Abdiel Monique MD    2 g Intravenous Once 17 0915      17 0816  vancomycin 500 mg/100 mL 0.9% NS IVPB (mbp)     Ordering Provider:  Aguilar Jenkins MD    500 mg  over 50 Minutes Intravenous Once 17 0900 17 1111    03/10/17 1401  [MAR Hold]  vancomycin 500 mg/100 mL 0.9% NS IVPB (mbp)     (MAR Hold since 03/10/17 1437)   Ordering Provider:  Aguilar Jenkins MD    500 mg  over 50 Minutes Intravenous Once 03/10/17 1600 03/10/17 1750    17 1817  [MAR Hold]  Vancomycin Pharmacy " Intermittent Dosing     (MAR Hold since 03/16/17 0852)   Ordering Provider:  Aguilar Jenkins MD     Does not apply Daily 03/09/17 1819 03/09/17 1807  Pharmacy to dose vancomycin     Ordering Provider:  Aguilar Jenkins MD     Does not apply Continuous PRN 03/09/17 1806 03/09/17 1738  vancomycin 2250 mg/500 mL 0.9% NS IVPB ([facility])     Ordering Provider:  Gamal Olguin MD    20 mg/kg × 113 kg Intravenous Once 03/09/17 1800 03/09/17 1759 03/09/17 1758  clindamycin (CLEOCIN) 600 mg in dextrose 5% 50 mL IVPB (premix)     Ordering Provider:  Gamal Olguin MD    600 mg Intravenous Once 03/09/17 1800 03/09/17 1810 03/09/17 1612  piperacillin-tazobactam (ZOSYN) 4.5 g in dextrose 100 mL IVPB (premix)     Ordering Provider:  Gamal Olguin MD    4.5 g Intravenous Once 03/09/17 1614 03/09/17 1759         Vancomycin dosing history:   3/9: 2250mg IV x1 @ 1759  3/10 random: 24.6 mcg/ml, given 500mg IV x1 @ 1700  3/11 0529 Vancomycin random level = 27.6 mcg/ml  3/12 428 Random level = 22.1, no dose given, received HD x1  3/13 0431 random level= 17.2 mcg/ml, 500mg IV x1 @ 1021  3/14 random: 25 mcg/ml, no dose given, HD x 4 hr  3/15 random: 17.2 mcg/ml, no dose  3/16: HD planned today. Give 750mg IV x1 after dialysis today.    Lab Results   Component Value Date    VANCORANDOM 17.60 03/15/2017    VANCORANDOM 25.00 03/14/2017    VANCORANDOM 17.20 03/13/2017     Assessment/Plan  1. UOP still minimal, Scr elevated. Last random level yesterday AM, estimate level ~ 15 mcg/ml this AM. Will give vancomycin 750mg IV x1 after HD completed today.   2. Recheck random level on 3/18 and hopefully can get on more consistent dosing schedule with pre-HD levels only.  Pharmacy will continue to follow daily while on vancomycin and adjust as needed.     Thank you,  Ada Lee, PharmD, BCPS  03/16/17 9:45 AM

## 2017-03-17 LAB
ANION GAP SERPL CALCULATED.3IONS-SCNC: 16 MMOL/L
BASOPHILS # BLD AUTO: 0.04 10*3/MM3 (ref 0–0.2)
BASOPHILS NFR BLD AUTO: 0.3 % (ref 0–1.5)
BUN BLD-MCNC: 23 MG/DL (ref 8–23)
BUN/CREAT SERPL: 5.3 (ref 7–25)
CALCIUM SPEC-SCNC: 7.8 MG/DL (ref 8.6–10.5)
CHLORIDE SERPL-SCNC: 104 MMOL/L (ref 98–107)
CO2 SERPL-SCNC: 22 MMOL/L (ref 22–29)
CREAT BLD-MCNC: 4.31 MG/DL (ref 0.57–1)
DEPRECATED RDW RBC AUTO: 49.5 FL (ref 37–54)
EOSINOPHIL # BLD AUTO: 0.43 10*3/MM3 (ref 0–0.7)
EOSINOPHIL NFR BLD AUTO: 3.7 % (ref 0.3–6.2)
ERYTHROCYTE [DISTWIDTH] IN BLOOD BY AUTOMATED COUNT: 16.3 % (ref 11.7–13)
GFR SERPL CREATININE-BSD FRML MDRD: 10 ML/MIN/1.73
GLUCOSE BLD-MCNC: 160 MG/DL (ref 65–99)
GLUCOSE BLDC GLUCOMTR-MCNC: 113 MG/DL (ref 70–130)
GLUCOSE BLDC GLUCOMTR-MCNC: 120 MG/DL (ref 70–130)
GLUCOSE BLDC GLUCOMTR-MCNC: 184 MG/DL (ref 70–130)
GLUCOSE BLDC GLUCOMTR-MCNC: 192 MG/DL (ref 70–130)
HCT VFR BLD AUTO: 35.9 % (ref 35.6–45.5)
HGB BLD-MCNC: 10.9 G/DL (ref 11.9–15.5)
IMM GRANULOCYTES # BLD: 0.38 10*3/MM3 (ref 0–0.03)
IMM GRANULOCYTES NFR BLD: 3.3 % (ref 0–0.5)
LYMPHOCYTES # BLD AUTO: 1.23 10*3/MM3 (ref 0.9–4.8)
LYMPHOCYTES NFR BLD AUTO: 10.6 % (ref 19.6–45.3)
MCH RBC QN AUTO: 25.6 PG (ref 26.9–32)
MCHC RBC AUTO-ENTMCNC: 30.4 G/DL (ref 32.4–36.3)
MCV RBC AUTO: 84.3 FL (ref 80.5–98.2)
MONOCYTES # BLD AUTO: 0.95 10*3/MM3 (ref 0.2–1.2)
MONOCYTES NFR BLD AUTO: 8.2 % (ref 5–12)
NEUTROPHILS # BLD AUTO: 8.52 10*3/MM3 (ref 1.9–8.1)
NEUTROPHILS NFR BLD AUTO: 73.9 % (ref 42.7–76)
PLATELET # BLD AUTO: 249 10*3/MM3 (ref 140–500)
PMV BLD AUTO: 9.7 FL (ref 6–12)
POTASSIUM BLD-SCNC: 3.8 MMOL/L (ref 3.5–5.2)
RBC # BLD AUTO: 4.26 10*6/MM3 (ref 3.9–5.2)
SODIUM BLD-SCNC: 142 MMOL/L (ref 136–145)
WBC NRBC COR # BLD: 11.55 10*3/MM3 (ref 4.5–10.7)

## 2017-03-17 PROCEDURE — 82962 GLUCOSE BLOOD TEST: CPT

## 2017-03-17 PROCEDURE — 25010000002 HEPARIN (PORCINE) PER 1000 UNITS: Performed by: INTERNAL MEDICINE

## 2017-03-17 PROCEDURE — 80048 BASIC METABOLIC PNL TOTAL CA: CPT | Performed by: INTERNAL MEDICINE

## 2017-03-17 PROCEDURE — 99024 POSTOP FOLLOW-UP VISIT: CPT | Performed by: SURGERY

## 2017-03-17 PROCEDURE — 63710000001 INSULIN ASPART PER 5 UNITS: Performed by: INTERNAL MEDICINE

## 2017-03-17 PROCEDURE — 97110 THERAPEUTIC EXERCISES: CPT

## 2017-03-17 PROCEDURE — 85025 COMPLETE CBC W/AUTO DIFF WBC: CPT | Performed by: SURGERY

## 2017-03-17 PROCEDURE — 92610 EVALUATE SWALLOWING FUNCTION: CPT

## 2017-03-17 PROCEDURE — 99232 SBSQ HOSP IP/OBS MODERATE 35: CPT | Performed by: INTERNAL MEDICINE

## 2017-03-17 RX ORDER — LEVOFLOXACIN 5 MG/ML
500 INJECTION, SOLUTION INTRAVENOUS EVERY 24 HOURS
Status: DISCONTINUED | OUTPATIENT
Start: 2017-03-17 | End: 2017-03-17

## 2017-03-17 RX ADMIN — HEPARIN SODIUM 5000 UNITS: 5000 INJECTION, SOLUTION INTRAVENOUS; SUBCUTANEOUS at 05:17

## 2017-03-17 RX ADMIN — INSULIN ASPART 2 UNITS: 100 INJECTION, SOLUTION INTRAVENOUS; SUBCUTANEOUS at 12:25

## 2017-03-17 RX ADMIN — INSULIN ASPART 2 UNITS: 100 INJECTION, SOLUTION INTRAVENOUS; SUBCUTANEOUS at 21:14

## 2017-03-17 RX ADMIN — HEPARIN SODIUM 5000 UNITS: 5000 INJECTION, SOLUTION INTRAVENOUS; SUBCUTANEOUS at 15:10

## 2017-03-17 RX ADMIN — HYDROCODONE BITARTRATE AND ACETAMINOPHEN 1 TABLET: 5; 325 TABLET ORAL at 10:19

## 2017-03-17 RX ADMIN — HEPARIN SODIUM 5000 UNITS: 5000 INJECTION, SOLUTION INTRAVENOUS; SUBCUTANEOUS at 21:14
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
## 2017-03-17 NOTE — PROGRESS NOTES
Continued Stay Note  [facility]     Patient Name: Luca Amezcua  MRN: [id number]  Today's Date: 3/17/2017    Admit Date: 3/9/2017          Discharge Plan       03/17/17 1358    Case Management/Social Work Plan    Plan Unknown    Additional Comments Spoke with brother via telephone.  Explained that due to extensive care that patient will require at dc, may need to consider LTACH.  He is agreeable to referral to [facility].  Call placed to Lizet and she will follow up on Monday morning.  Brother states that the patient does have secondary insurance and  the ICU nurse made a copy on Wednesday.  I found copies of 3 different cards on the chart and have insurance verification to follow up.               Discharge Codes     None            Karen San RN

## 2017-03-17 NOTE — PLAN OF CARE
Problem: Patient Care Overview (Adult)  Goal: Plan of Care Review  Outcome: Ongoing (interventions implemented as appropriate)    03/17/17 5653   Coping/Psychosocial Response Interventions   Plan Of Care Reviewed With patient   Patient Care Overview   Progress progress toward functional goals as expected   Outcome Evaluation   Outcome Summary/Follow up Plan Pt increasing with strength and bed mobility as well as amb with RWX

## 2017-03-17 NOTE — PROGRESS NOTES
LOS: 8 days     Chief Complaint:  MRSA wound infection    Interval History:  Afebrile, s/p R IJ tunneled catheter placement, no new complaints. Diarrhea resolved. No abd pain, no N/V. TOlerating abx without a rash. No SOB pr CP.     Vital Signs  Temp:  [96.6 °F (35.9 °C)-98.4 °F (36.9 °C)] 98.4 °F (36.9 °C)  Heart Rate:  [] 111  Resp:  [12-20] 16  BP: (130-167)/(81-95) 138/87    Physical Exam:  General: In no acute distress  HEENT: Oropharynx clear, moist mucous membranes  Cardiovascular: RRR, normal S1 and S2, no M/R/G  Respiratory: Clear to ascultation bilaterally, no wheezing  GI: Soft, NT/ND, + bowel sounds bilaterally, no masses  Skin: No rashes or lesions, wound vac in place in b/l groin areas   Extremities: No edema, cyanosis    Antibiotics:  Vancomycin dosing per pharmacy     Results Review:     I reviewed the patient's new clinical results.  I reviewed the patient's new imaging results and agree with the interpretation.    Lab Results   Component Value Date    WBC 11.55 (H) 03/17/2017    HGB 10.9 (L) 03/17/2017    HCT 35.9 03/17/2017    MCV 84.3 03/17/2017     03/17/2017     Lab Results   Component Value Date    GLUCOSE 98 03/16/2017    BUN 39 (H) 03/16/2017    CREATININE 6.44 (H) 03/16/2017    EGFRIFNONA 7 (L) 03/16/2017    BCR 6.1 (L) 03/16/2017    CO2 22.2 03/16/2017    CALCIUM 7.2 (L) 03/16/2017    ALBUMIN 2.80 (L) 03/14/2017    LABIL2 0.9 03/11/2017     (H) 03/14/2017     (H) 03/14/2017       Microbiology:  3/14 C diff negative   3/13 BCx NGTD x 2  3/10 OR wound cx MRSA and CoNS           Fungal P  3/9 BCx Strep mutans x1     Assessment/Plan   This is a 61-year-old female who fell 2 days prior to admission in the shower and was unable to get up or get help for the last 2 days until being found by EMS. Physical exam revealed necrotizing wounds over her groin area. She underwent debridement on March 10 with necrosis seen of the subcutaneous tissue sparing the muscle. Operative  cultures are growing MRSA.  Wounds at this point are looking well. The plan is for her wound VAC placement. As long as the patient continues to do well clinically anticipate 10-14 days of IV treatment follow by oral therapy deemed necessary by surgery.     1.  Necrotizing soft tissue infection status post debridement on March 10  - Continue vancomycin day 7  - would anticipate switching to oral therapy (doxycycline 100mg PO BID)  at discharge. Would treatment for a minimum of 14 days total but this can be extended per surgery if needed.   - Management per surgery     2.  Blood culture positive for strep mutans - most likely a contaminant but unable to determine that his only 1 blood culture was drawn on admission  - Repeat blood cultures ×2 are negative to date     3.  Acute kidney injury now on hemodialysis  - Management per nephrology  - d/c dax      4.  Severe rhabdomyolysis improving     5.  Lactic acidosis resolved     6.  Leukocytosis resolved    We will see again on Monday. Please call us with any questions or concerns

## 2017-03-17 NOTE — PROGRESS NOTES
POD 7 bilateral groin debridement    Subjective: No complaints today.  Groin pain is minimal at this time.    Objective:  Patient has been afebrile and vital signs stable  Skin exam: No skip and cellulitis in bilateral groins.  Vacs are in place and functioning.    Assessment and plan:  Status post pressure necrosis to bilateral groins with superficial necrotizing soft tissue infection.  Continue wound VAC dressing changes.  I suspect it is going to be a long time getting this healed.  Antibiotics per the infectious disease service.  We'll continue to follow peripherally.    Andrea Fried MD  General and Endoscopic Surgery  [facility]    [address], [address]  [place], [place], [address]  P: [phone]  F: [phone]

## 2017-03-17 NOTE — SIGNIFICANT NOTE
03/17/17 1043   Rehab Treatment   Discipline physical therapy assistant   Treatment Not Performed patient/family decline treatment, pt not feeling well  (Pt to be seen on 3/20/17 by PT)   Recommendation   PT - Next Appointment 03/20/17

## 2017-03-17 NOTE — PROGRESS NOTES
"      [facility]         Dr Nicholas   LOS: 8 days   Patient Care Team:  No Known Provider as PCP - General    Chief Complaint:  Follow-up on rhabdomyolysis, renal failure, sepsis.  MRSA wound culture    Interval History:   Postoperative day 7 Status post debridement of the right bilateral groin area per surgery.  No necrotizing fasciitis noted.  Only Necrosis of the skin noted.  Continues to improve.  Status post placement off palindrome catheter.  Sleepy and tired post catheter placement.  REVIEW OF SYSTEMS:   Hoarseness improving but denies any shortness of breath or cough.    Ventilator/Non-Invasive Ventilation Settings           none    Objective   Vital Signs  Temp:  [96.6 °F (35.9 °C)-98.4 °F (36.9 °C)] 98.4 °F (36.9 °C)  Heart Rate:  [] 111  Resp:  [16-18] 16  BP: (138-164)/(87-93) 138/87    Intake/Output Summary (Last 24 hours) at 03/17/17 1356  Last data filed at 03/17/17 1000   Gross per 24 hour   Intake    394 ml   Output   3765 ml   Net  -3371 ml     Flowsheet Rows         First Filed Value    Admission Height  64\" (162.6 cm) Documented at 03/09/2017 1426    Admission Weight  250 lb (113 kg) Documented at 03/09/2017 1426          Physical Exam:   General Appearance:    awake no distress following simple commands..     Lungs:    diminished but equal breath sounds bilaterally.      Heart:    Regular rhythm and normal rate, normal S1 and S2, no            murmur, no gallop, no rub, no click   Chest Wall:    right breast skin appears to be slightly indurated and edematous.  No redness or discharge    Abdomen:     obese.  Soft.  There is post-debridement of bilateral groin wound.  Appears to be pink with no evidence of necrosis.     Neuro:   sedated intubated.  Following simple commands.     Extremities:   Moves all extremities well, +++ edema in legs and arms, no cyanosis, no             Redness          Results Review:          Results from last 7 days  Lab Units 03/17/17  0537 " 03/16/17  0401 03/15/17  0328   SODIUM mmol/L 142 140 139   POTASSIUM mmol/L 3.8 3.7 3.6   CHLORIDE mmol/L 104 100 97*   TOTAL CO2 mmol/L 22.0 22.2 23.0   BUN mg/dL 23 39* 29*   CREATININE mg/dL 4.31* 6.44* 5.06*   GLUCOSE mg/dL 160* 98 114*   CALCIUM mg/dL 7.8* 7.2* 7.7*       Results from last 7 days  Lab Units 03/12/17  1022 03/11/17  1314 03/11/17  0358   CK TOTAL U/L 29445* 60690* 71105*       Results from last 7 days  Lab Units 03/17/17  0537 03/16/17  0401 03/15/17  0328   WBC 10*3/mm3 11.55* 11.50* 10.30   HEMOGLOBIN g/dL 10.9* 10.2* 10.6*   HEMATOCRIT % 35.9 32.2* 33.8*   PLATELETS 10*3/mm3 249 224 223       Results from last 7 days  Lab Units 03/12/17  1022   INR  1.23*         @LABRCNT(bnp)@  I reviewed the patient's new clinical results.  I personally viewed and interpreted the patient's CXR        Medication Review:     heparin (porcine) 5,000 Units Subcutaneous Q8H   insulin aspart 0-9 Units Subcutaneous Q4H   Vancomycin Pharmacy Intermittent Dosing  Does not apply Daily         Pharmacy to dose vancomycin     sodium chloride 9 mL/hr Last Rate: 9 mL/hr (03/17/17 0600)       Assessment/Plan   Acute postoperative respiratory failure  1) Severe rhabdomyolysis  2) postoperative day 5 radical debridement of bilateral necrotic groin wounds with no evidence of fasciitis wound culture positive for MRSA  3) Oliguric acute renal failure, 2ary to #1 and dehydration  4) Third spacing  5) Lactic acidosis resolved  6) Sepsis, 2arty to #2  7) Metabolic acidosis   8) Morbid obesity  9) Protein calorie malnutrition (Alb=2.4)    Plan   Continue vancomycin per IDs recommendation  Tolerating oral diet well.  Dialysis per nephrology.  Palindrome catheter placed.  Awaiting recovery for renal function  Physical therapy  Continue supportive care  Continue current care  Physical therapy    Billie Nicholas MD  03/17/17  1:56 PM
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
## 2017-03-17 NOTE — PROGRESS NOTES
Acute Care - Physical Therapy Treatment Note  [facility]     Patient Name: Luca Amezcua  : 1955  MRN: [id number]  Today's Date: 3/17/2017  Onset of Illness/Injury or Date of Surgery Date: 17  Date of Referral to PT: 17  Referring Physician: Billie Salmeron    Admit Date: 3/9/2017    Visit Dx:    ICD-10-CM ICD-9-CM   1. Acute renal failure, unspecified acute renal failure type N17.9 584.9   2. Fall, initial encounter W19.XXXA E888.9   3. Sepsis, due to unspecified organism A41.9 038.9     995.91   4. Metabolic acidosis E87.2 276.2   5. Hypothermia, initial encounter T68.XXXA 991.6   6. Necrotizing fasciitis M72.6 728.86   7. Traumatic rhabdomyolysis, initial encounter T79.6XXA 958.6     Patient Active Problem List   Diagnosis   • Sepsis   • Renal failure, acute               Adult Rehabilitation Note       17 1300 17 1550       Rehab Assessment/Intervention    Discipline physical therapy assistant  -CW physical therapist  -CH     Document Type therapy note (daily note)  -CW therapy note (daily note)  -CH     Subjective Information agree to therapy;complains of;weakness  -CW agree to therapy  -CH     Patient Effort, Rehab Treatment adequate  -CW good  -CH     Symptoms Noted During/After Treatment  none  -CH     Precautions/Limitations fall precautions  -CW fall precautions  -CH     Recorded by [CW] Tl Oro [CH] Katie Hagan, PT     Pain Assessment    Pain Assessment No/denies pain  -CW No/denies pain  -CH     Recorded by [CW] Tl Oro [CH] Katie Hagan, PT     Cognitive Assessment/Intervention    Current Cognitive/Communication Assessment impaired  -CW impaired  -CH     Orientation Status oriented to;person;place  -CW      Follows Commands/Answers Questions 100% of the time;able to follow single-step instructions;needs cueing  -      Personal Safety mild impairment;decreased awareness, need for safety;decreased insight to deficits  -CW       Personal Safety Interventions fall prevention program maintained;gait belt;nonskid shoes/slippers when out of bed  -CW      Recorded by [CW] Tl Oro [CH] Kaite Hagan, PT     Bed Mobility, Assessment/Treatment    Bed Mob, Supine to Sit, [place] minimum assist (75% patient effort)  -CW verbal cues required;nonverbal cues required (demo/gesture);moderate assist (50% patient effort);2 person assist required  -     Bed Mob, Sit to Supine, [place] minimum assist (75% patient effort)  -CW verbal cues required;nonverbal cues required (demo/gesture);moderate assist (50% patient effort);2 person assist required  -CH     Recorded by [CW] Tl rOo [CH] Katie Hagan, PT     Transfer Assessment/Treatment    Transfers, Sit-Stand [place] contact guard assist;2 person assist required  -CW verbal cues required;nonverbal cues required (demo/gesture);moderate assist (50% patient effort);2 person assist required  -     Transfers, Stand-Sit [place] contact guard assist;2 person assist required  -CW verbal cues required;nonverbal cues required (demo/gesture);moderate assist (50% patient effort);2 person assist required  -     Transfers, Sit-Stand-Sit, Assist Device rolling walker  -      Transfer, Comment  pt performed sit to stand x2  -     Recorded by [CW] Tl Oro [CH] Katie Hagan PT     Gait Assessment/Treatment    Gait, [place] Level contact guard assist;2 person assist required  -CW verbal cues required;nonverbal cues required (demo/gesture);moderate assist (50% patient effort);2 person assist required;hand held assist  -     Gait, Assistive Device rolling walker  -      Gait, Distance (Feet) 8  -CW --   4 sidesteps to HOB  -     Gait, Gait Deviations rafal decreased;step length decreased;stride length decreased  -CW rafal decreased;step length decreased;stride length decreased  -     Gait, Comment  pt having difficulty taking steps  initially  -CH     Recorded by [CW] Tl Oro [CH] Katie Hagan, PT     Therapy Exercises    Bilateral Lower Extremities AROM:;LAQ;10 reps;sitting;ankle pumps/circles  -CW      Recorded by [CW] Tl Oro      Positioning and Restraints    Pre-Treatment Position in bed  -CW in bed  -CH     Post Treatment Position bed  -CW bed  -CH     In Bed notified nsg;supine;call light within reach;encouraged to call for assist;exit alarm on  -CW supine;call light within reach;encouraged to call for assist;exit alarm on  -CH     Recorded by [CW] Tl Oro [CH] Katie Hagan, PT       User Key  (r) = Recorded By, (t) = Taken By, (c) = Cosigned By    Initials Name Effective Dates     Katie Hagan, PT 12/01/15 -     CW Tl Oro 12/13/16 -                 IP PT Goals       03/13/17 1158          Bed Mobility PT LTG    Bed Mobility PT LTG, Date Established 03/13/17  -MS      Bed Mobility PT LTG, Time to Achieve 5 - 7 days  -MS      Bed Mobility PT LTG, Activity Type all bed mobility  -MS      Bed Mobility PT LTG, [place] Level minimum assist (75% patient effort)  -MS      Transfer Training PT LTG    Transfer Training PT LTG, Date Established 03/13/17  -MS      Transfer Training PT LTG, Time to Achieve 5 - 7 days  -MS      Transfer Training PT LTG, Activity Type bed to chair /chair to bed;sit to stand/stand to sit  -MS      Transfer Training PT LTG, [place] Level minimum assist (75% patient effort)  -MS      Transfer Training PT LTG, Assist Device --   With A.A.D.  -MS      Gait Training PT LTG    Gait Training Goal PT LTG, Date Established 03/13/17  -MS      Gait Training Goal PT LTG, Time to Achieve 5 - 7 days  -MS      Gait Training Goal PT LTG, [place] Level minimum assist (75% patient effort)  -MS      Gait Training Goal PT LTG, Assist Device --   With A.A.D.  -MS      Gait Training Goal PT LTG, Distance to Achieve 100 feet  -MS      Dynamic Sitting Balance PT LTG     Dynamic Sitting Balance PT LTG, Date Established 03/13/17  -MS      Dynamic Sitting Balance PT LTG, Time to Achieve 5 - 7 days  -MS      Dynamic Sitting Balance PT LTG, [place] Level independent  -MS      Dynamic Sitting Balance PT LTG, Assist Device UE Support  -MS        User Key  (r) = Recorded By, (t) = Taken By, (c) = Cosigned By    Initials Name Provider Type    MS Abdiel LOCKWOOD Joselito, PT Physical Therapist          Physical Therapy Education     Title: PT OT SLP Therapies (Done)     Topic: Physical Therapy (Done)     Point: Mobility training (Done)    Learning Progress Summary    Learner Readiness Method Response Comment Documented by Status   Patient Acceptance E,TB DU,VU  CW 03/17/17 1338 Done    Acceptance E,TB,D VU,NR  CH 03/14/17 1655 Done    Acceptance E,D NR Pt. is Hard of hearing; Must speak louder and clearly for pt. to hear you. MS 03/13/17 1157 Active               Point: Home exercise program (Done)    Learning Progress Summary    Learner Readiness Method Response Comment Documented by Status   Patient Acceptance E,TB DU,VU  CW 03/17/17 1338 Done    Acceptance E,D NR Pt. is Hard of hearing; Must speak louder and clearly for pt. to hear you. MS 03/13/17 1157 Active               Point: Body mechanics (Done)    Learning Progress Summary    Learner Readiness Method Response Comment Documented by Status   Patient Acceptance E,TB DU,VU  CW 03/17/17 1338 Done    Acceptance E,TB,D VU,NR  CH 03/14/17 1655 Done    Acceptance E,D NR Pt. is Hard of hearing; Must speak louder and clearly for pt. to hear you. MS 03/13/17 1157 Active               Point: Precautions (Done)    Learning Progress Summary    Learner Readiness Method Response Comment Documented by Status   Patient Acceptance E,TB DU,VU  CW 03/17/17 1338 Done    Acceptance E,TB,D VU,NR  CH 03/14/17 1655 Done    Acceptance E,D NR Pt. is Hard of hearing; Must speak louder and clearly for pt. to hear you. MS 03/13/17 1157 Active                       User Key     Initials Effective Dates Name Provider Type Discipline     12/01/15 -  Katie Hagan, PT Physical Therapist PT    MS 12/01/15 -  Abdiel Yee, PT Physical Therapist PT    CW 12/13/16 -  Tl Oro Physical Therapy Assistant PT                    PT Recommendation and Plan  Anticipated Discharge Disposition: skilled nursing facility  Planned Therapy Interventions: balance training, bed mobility training, gait training, patient/family education, postural re-education, ROM (Range of Motion), strengthening, transfer training  PT Frequency: daily  Plan of Care Review  Plan Of Care Reviewed With: patient  Progress: progress toward functional goals as expected  Outcome Summary/Follow up Plan: Pt increasing with strength and bed mobility as well as amb with RWX          Outcome Measures       03/17/17 1300 03/14/17 1600       How much help from another person do you currently need...    Turning from your back to your side while in flat bed without using bedrails? 3  -CW 2  -CH     Moving from lying on back to sitting on the side of a flat bed without bedrails? 3  -CW 2  -CH     Moving to and from a bed to a chair (including a wheelchair)? 3  -CW 2  -CH     Standing up from a chair using your arms (e.g., wheelchair, bedside chair)? 3  -CW 2  -CH     Climbing 3-5 steps with a railing? 1  -CW 1  -CH     To walk in hospital room? 3  -CW 1  -CH     AM-PAC 6 Clicks Score 16  -CW 10  -CH     Functional Assessment    Outcome Measure Options AM-PAC 6 Clicks Basic Mobility (PT)  -CW AM-PAC 6 Clicks Basic Mobility (PT)  -CH       User Key  (r) = Recorded By, (t) = Taken By, (c) = Cosigned By    Initials Name Provider Type    CH Katie Hagan, PT Physical Therapist    CW Tl Oro Physical Therapy Assistant           Time Calculation:         PT Charges       03/17/17 1339 03/17/17 1046 03/17/17 1043    Time Calculation    Start Time 1310  -CW      Stop Time 1336  -CW      Time Calculation  (min) 26 min  -CW      PT Received On 03/17/17  -CW      PT - Next Appointment 03/18/17  -CW 03/17/17  -CW 03/20/17  -CW      User Key  (r) = Recorded By, (t) = Taken By, (c) = Cosigned By    Initials Name Provider Type    CW Tl Oro Physical Therapy Assistant          Therapy Charges for Today     Code Description Service Date Service Provider Modifiers Qty    72223373222 HC PT THER SUPP EA 15 MIN 3/17/2017 Tl Oro GP 2    97923148933 HC PT THER PROC EA 15 MIN 3/17/2017 Tl Oro GP 2          PT G-Codes  Outcome Measure Options: AM-PAC 6 Clicks Basic Mobility (PT)    Tl Oro  3/17/2017

## 2017-03-17 NOTE — PROGRESS NOTES
The patient is one day status post placement of a right internal jugular tunnel dialysis catheter.  She is doing satisfactorily with no issues related to this.    Her neck incision is healing satisfactorily.  No hematoma.  No ecchymosis.  No erythema or cellulitis.    We'll continue to observe and see how with this.

## 2017-03-17 NOTE — PLAN OF CARE
Problem: Patient Care Overview (Adult)  Goal: Plan of Care Review    03/17/17 1653   Coping/Psychosocial Response Interventions   Plan Of Care Reviewed With patient   Patient Care Overview   Progress improving   Outcome Evaluation   Outcome Summary/Follow up Plan BSE: safe for regular & thin; SLP to s/o         Problem: Inpatient SLP  Goal: Dysphagia- Patient will improve swallowing skills to begin to take some PO safely  Outcome: Outcome(s) achieved Date Met:  03/17/17 03/17/17 1653   Begin to Take Some PO Safely   Begin to Take Some PO Safely- SLP, Outcome goal met       Goal: Dysphagia- Patient will safely consume diet as per recommendation with no signs/symptoms of aspiration  Outcome: Outcome(s) achieved Date Met:  03/17/17 03/14/17 1609 03/17/17 1653   Safely Consume Diet   Safely Consume Diet- SLP, Date Established --  03/17/17   Safely Consume Diet- SLP, Time to Achieve by discharge --    Safely Consume Diet- SLP, Additional Goal --  regular & thin   Safely Consume Diet- SLP, Date Goal Reviewed --  03/17/17   Safely Consume Diet- SLP, Outcome --  goal met

## 2017-03-17 NOTE — NURSING NOTE
Changed VAC drsgs to bilateral groin surgical wounds using black granufoam; 2 pieces in each wound; versatel in base of left groin wound; connected to [employer] VAC at 125 mm hg, continuous suction.  Wound beds clean, pink, beginning to granulate.  Per iwound skin intact and without erythema. Pt tolerated drsg chg well; had oral pain medication about an hour prior to drsg chg.  Small amt serous drainage; no odor.  Next VAC chg will be Monday

## 2017-03-17 NOTE — SIGNIFICANT NOTE
03/17/17 1046   Rehab Treatment   Discipline physical therapy assistant   Treatment Not Performed patient unavailable for treatment  (Pt is with ST.  PT to check back in PM)   Recommendation   PT - Next Appointment 03/17/17

## 2017-03-17 NOTE — PLAN OF CARE
"Problem: Patient Care Overview (Adult)  Goal: Plan of Care Review  Outcome: Ongoing (interventions implemented as appropriate)    03/16/17 0326 03/17/17 0253   Coping/Psychosocial Response Interventions   Plan Of Care Reviewed With --  patient   Patient Care Overview   Progress no change --        Goal: Adult Individualization and Mutuality  Outcome: Ongoing (interventions implemented as appropriate)    03/10/17 0551   Individualization   Patient Specific Preferences Likes to be called \"Sunitha\"       Goal: Discharge Needs Assessment  Outcome: Ongoing (interventions implemented as appropriate)    03/13/17 1325 03/17/17 0253   Discharge Needs Assessment   Concerns To Be Addressed --  adjustment to diagnosis/illness concerns;basic needs concerns   Concerns Comments According to family, pt refuses to leave her home but is unable to care for herself and falls frequently. --    Readmission Within The Last 30 Days --  no previous admission in last 30 days   Equipment Needed After Discharge --  none   Discharge Facility/Level Of Care Needs nursing facility, skilled --    Discharge Disposition still a patient --    Current Health   Anticipated Changes Related to Illness inability to care for self --    Living Environment   Transportation Available ambulance --    Self-Care   Equipment Currently Used at Home walker, standard --          Problem: Sepsis (Adult)  Goal: Signs and Symptoms of Listed Potential Problems Will be Absent or Manageable (Sepsis)  Outcome: Ongoing (interventions implemented as appropriate)    03/17/17 0253   Sepsis   Problems Assessed (Sepsis) all   Problems Present (Sepsis) situational response;glycemic control, impaired         Problem: Wound, Traumatic, Nonburn (Adult)  Goal: Signs and Symptoms of Listed Potential Problems Will be Absent or Manageable (Wound, Traumatic, Nonburn)  Outcome: Ongoing (interventions implemented as appropriate)    03/17/17 0253   Wound, Traumatic, Nonburn   Problems Assessed " (Wound) all   Problems Present (Wound) pain         Problem: Urine Elimination, Impaired (Adult)  Goal: Effective Urinary Elimination  Outcome: Ongoing (interventions implemented as appropriate)  Goal: Reduced Incontinence Episodes  Outcome: Ongoing (interventions implemented as appropriate)    03/17/17 0253   Urine Elimination, Impaired (Adult)   Reduced Incontinence Episodes making progress toward outcome

## 2017-03-18 LAB
ANION GAP SERPL CALCULATED.3IONS-SCNC: 19.5 MMOL/L
BACTERIA SPEC AEROBE CULT: NORMAL
BACTERIA SPEC AEROBE CULT: NORMAL
BUN BLD-MCNC: 38 MG/DL (ref 8–23)
BUN/CREAT SERPL: 6.1 (ref 7–25)
CALCIUM SPEC-SCNC: 7.8 MG/DL (ref 8.6–10.5)
CHLORIDE SERPL-SCNC: 101 MMOL/L (ref 98–107)
CO2 SERPL-SCNC: 21.5 MMOL/L (ref 22–29)
CREAT BLD-MCNC: 6.2 MG/DL (ref 0.57–1)
DEPRECATED RDW RBC AUTO: 51.1 FL (ref 37–54)
ERYTHROCYTE [DISTWIDTH] IN BLOOD BY AUTOMATED COUNT: 16.5 % (ref 11.7–13)
GFR SERPL CREATININE-BSD FRML MDRD: 7 ML/MIN/1.73
GLUCOSE BLD-MCNC: 144 MG/DL (ref 65–99)
GLUCOSE BLDC GLUCOMTR-MCNC: 108 MG/DL (ref 70–130)
GLUCOSE BLDC GLUCOMTR-MCNC: 123 MG/DL (ref 70–130)
GLUCOSE BLDC GLUCOMTR-MCNC: 131 MG/DL (ref 70–130)
GLUCOSE BLDC GLUCOMTR-MCNC: 146 MG/DL (ref 70–130)
GLUCOSE BLDC GLUCOMTR-MCNC: 202 MG/DL (ref 70–130)
HCT VFR BLD AUTO: 36.2 % (ref 35.6–45.5)
HGB BLD-MCNC: 11.1 G/DL (ref 11.9–15.5)
MCH RBC QN AUTO: 26.4 PG (ref 26.9–32)
MCHC RBC AUTO-ENTMCNC: 30.7 G/DL (ref 32.4–36.3)
MCV RBC AUTO: 86.2 FL (ref 80.5–98.2)
PLATELET # BLD AUTO: 241 10*3/MM3 (ref 140–500)
PMV BLD AUTO: 10 FL (ref 6–12)
POTASSIUM BLD-SCNC: 3.7 MMOL/L (ref 3.5–5.2)
RBC # BLD AUTO: 4.2 10*6/MM3 (ref 3.9–5.2)
SODIUM BLD-SCNC: 142 MMOL/L (ref 136–145)
VANCOMYCIN SERPL-MCNC: 14.8 MCG/ML (ref 5–40)
WBC NRBC COR # BLD: 13.44 10*3/MM3 (ref 4.5–10.7)

## 2017-03-18 PROCEDURE — 25010000002 VANCOMYCIN PER 500 MG: Performed by: INTERNAL MEDICINE

## 2017-03-18 PROCEDURE — 80202 ASSAY OF VANCOMYCIN: CPT | Performed by: INTERNAL MEDICINE

## 2017-03-18 PROCEDURE — 63710000001 INSULIN ASPART PER 5 UNITS: Performed by: INTERNAL MEDICINE

## 2017-03-18 PROCEDURE — 25010000002 HEPARIN (PORCINE) PER 1000 UNITS: Performed by: INTERNAL MEDICINE

## 2017-03-18 PROCEDURE — 85027 COMPLETE CBC AUTOMATED: CPT | Performed by: INTERNAL MEDICINE

## 2017-03-18 PROCEDURE — 80048 BASIC METABOLIC PNL TOTAL CA: CPT | Performed by: INTERNAL MEDICINE

## 2017-03-18 PROCEDURE — 82962 GLUCOSE BLOOD TEST: CPT

## 2017-03-18 RX ORDER — ALBUMIN (HUMAN) 12.5 G/50ML
12.5 SOLUTION INTRAVENOUS AS NEEDED
Status: ACTIVE | OUTPATIENT
Start: 2017-03-18 | End: 2017-03-19

## 2017-03-18 RX ORDER — VANCOMYCIN HYDROCHLORIDE 1 G/200ML
1000 INJECTION, SOLUTION INTRAVENOUS ONCE
Status: COMPLETED | OUTPATIENT
Start: 2017-03-18 | End: 2017-03-18

## 2017-03-18 RX ADMIN — HEPARIN SODIUM 3200 UNITS: 1000 INJECTION, SOLUTION INTRAVENOUS; SUBCUTANEOUS at 14:18

## 2017-03-18 RX ADMIN — HEPARIN SODIUM 5000 UNITS: 5000 INJECTION, SOLUTION INTRAVENOUS; SUBCUTANEOUS at 05:53

## 2017-03-18 RX ADMIN — INSULIN ASPART 4 UNITS: 100 INJECTION, SOLUTION INTRAVENOUS; SUBCUTANEOUS at 20:30

## 2017-03-18 RX ADMIN — HEPARIN SODIUM 5000 UNITS: 5000 INJECTION, SOLUTION INTRAVENOUS; SUBCUTANEOUS at 16:13

## 2017-03-18 RX ADMIN — VANCOMYCIN HYDROCHLORIDE 1000 MG: 1 INJECTION, SOLUTION INTRAVENOUS at 16:13

## 2017-03-18 RX ADMIN — HEPARIN SODIUM 5000 UNITS: 5000 INJECTION, SOLUTION INTRAVENOUS; SUBCUTANEOUS at 22:42

## 2017-03-18 NOTE — PLAN OF CARE
Problem: Patient Care Overview (Adult)  Goal: Plan of Care Review  Outcome: Ongoing (interventions implemented as appropriate)    03/18/17 0433   Coping/Psychosocial Response Interventions   Plan Of Care Reviewed With patient   Patient Care Overview   Progress improving   Outcome Evaluation   Outcome Summary/Follow up Plan No c/o of pain, wound vac intact, skin care, q2 turn, glucose level is WNL       Goal: Adult Individualization and Mutuality  Outcome: Ongoing (interventions implemented as appropriate)  Goal: Discharge Needs Assessment  Outcome: Ongoing (interventions implemented as appropriate)    Problem: Sepsis (Adult)  Goal: Signs and Symptoms of Listed Potential Problems Will be Absent or Manageable (Sepsis)  Outcome: Ongoing (interventions implemented as appropriate)    Problem: Wound, Traumatic, Nonburn (Adult)  Goal: Signs and Symptoms of Listed Potential Problems Will be Absent or Manageable (Wound, Traumatic, Nonburn)  Outcome: Ongoing (interventions implemented as appropriate)    Problem: Urine Elimination, Impaired (Adult)  Goal: Effective Urinary Elimination  Outcome: Ongoing (interventions implemented as appropriate)  Goal: Reduced Incontinence Episodes  Outcome: Ongoing (interventions implemented as appropriate)

## 2017-03-18 NOTE — PLAN OF CARE
Problem: Patient Care Overview (Adult)  Goal: Plan of Care Review  Outcome: Ongoing (interventions implemented as appropriate)    03/18/17 1413   Coping/Psychosocial Response Interventions   Plan Of Care Reviewed With patient   Patient Care Overview   Progress no change   Outcome Evaluation   Outcome Summary/Follow up Plan pt has wound vac x2, multiple wounds on limbs, Pt had dialysis today , IV Vanc ordered, pt on Rm air, incont. care provided this am, pt a/ox4, very quiet and can be slow to respond , [place],        Goal: Adult Individualization and Mutuality  Outcome: Ongoing (interventions implemented as appropriate)  Goal: Discharge Needs Assessment  Outcome: Ongoing (interventions implemented as appropriate)    Problem: Sepsis (Adult)  Goal: Signs and Symptoms of Listed Potential Problems Will be Absent or Manageable (Sepsis)  Outcome: Ongoing (interventions implemented as appropriate)    Problem: Wound, Traumatic, Nonburn (Adult)  Goal: Signs and Symptoms of Listed Potential Problems Will be Absent or Manageable (Wound, Traumatic, Nonburn)  Outcome: Ongoing (interventions implemented as appropriate)    Problem: Fall Risk (Adult)  Goal: Absence of Falls  Outcome: Ongoing (interventions implemented as appropriate)    Problem: Urine Elimination, Impaired (Adult)  Goal: Effective Urinary Elimination  Outcome: Ongoing (interventions implemented as appropriate)  Goal: Reduced Incontinence Episodes  Outcome: Ongoing (interventions implemented as appropriate)

## 2017-03-18 NOTE — SIGNIFICANT NOTE
03/18/17 1118   Rehab Treatment   Discipline physical therapist   Treatment Not Performed patient/family declined treatment  (off floor to dialysis, will follow up tomorrow)   Recommendation   PT - Next Appointment 03/19/17
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
## 2017-03-18 NOTE — PROGRESS NOTES
PROGRESS NOTE   LOS: 10 days   Patient Care Team:  No Known Provider as PCP - General    Chief Complaint: Rhabdomyolysis, renal failure, sepsis, MRSA wound with necrotizing fasciitis    Interval History: Patient was admitted on 3/9/17 after sustaining a fall while in the shower with the water running and was down for about 3 days.  She was hypothermic and initially was warmed.  She had an open wound infection of bilateral groin and ended up having a radical debridement of bilateral necrotic groin lesions down to the subcutaneous tissue on 3/10/17.  She was treated with vancomycin and Zosyn.  First blood culture from 3/9/17 showed positive for strep mutans, liver further blood cultures have shown no growth to date.    She was thought to have sepsis secondary to her soft tissue infection and was treated with IV fluid despite having thirds a sitting.  She was also treated with IV bicarbonate drip due to lactic acidosis.  After surgery on 3/10/17 she remained on the ventilator due to acute hypoxic respiratory failure.  She was also started on Levophed due to hypotension.  She was extubated on 3/12/17.  Wound culture from surgery pathology showed positive for MRSA and infectious disease was consulted and vancomycin was continued.     She also had acute renal failure and was initially oliguric.  She had a right IJ Shiley hemodialysis catheter placed on 3/10/17 and began hemodialysis on 3/14/17.  She ended up having a palindrome catheter inserted in right IJ on 3/16/17 by Dr. Snow.    REVIEW OF SYSTEMS:   CARDIOVASCULAR: No chest pain, chest pressure or chest discomfort. No palpitations or edema.   RESPIRATORY: No shortness of breath, cough or sputum.   GASTROINTESTINAL: No anorexia, nausea, vomiting or diarrhea. No abdominal pain or blood.   HEMATOLOGIC: No bleeding or bruising.       Vital Signs  Temp:  [96.6 °F (35.9 °C)-98.1 °F (36.7 °C)] 96.6 °F (35.9 °C)  Heart Rate:  [] 103  Resp:  [16] 16  BP:  "(120-162)/(72-98) 162/98  SpO2:  [92 %-95 %] 95 %  on    O2 Device: room air    Intake/Output Summary (Last 24 hours) at 03/19/17 1057  Last data filed at 03/18/17 2034   Gross per 24 hour   Intake    240 ml   Output   3000 ml   Net  -2760 ml     Flowsheet Rows         First Filed Value    Admission Height  64\" (162.6 cm) Documented at 03/09/2017 1426    Admission Weight  250 lb (113 kg) Documented at 03/09/2017 1426          Physical Exam:  GEN:  No acute distress, alert, cooperative, well developed   EYES:   Sclera clear. No icterus. PERRL. Normal EOM  ENT:   External ears/nose normal, no oral lesions, no thrush, mucous membranes moist  NECK:  Supple, midline trachea, no JVD  LUNGS: Normal chest on inspection, diminished , no wheezes. No rhonchi. No crackles. Respirations regular, even and unlabored.   CV:  Regular rhythm and rate. Normal S1/S2. No murmurs, gallops, or rubs noted.  ABD:  Soft, non-tender and non-distended. Normal bowel sounds. No guarding, wound vac to LLQ  EXT:  Moves all extremities well. No cyanosis. No redness. No edema.   Skin: dry, intact, no bleeding    Results Review:        Results from last 7 days  Lab Units 03/19/17  0542 03/18/17  0417 03/17/17  0537 03/16/17  0401 03/15/17  0328 03/14/17  0348 03/13/17  0431   SODIUM mmol/L 138 142 142 140 139 142 140   POTASSIUM mmol/L 4.2 3.7 3.8 3.7 3.6 4.3 3.8   CHLORIDE mmol/L 97* 101 104 100 97* 97* 98   TOTAL CO2 mmol/L 22.9 21.5* 22.0 22.2 23.0 19.5* 22.0   BUN mg/dL 21 38* 23 39* 29* 45* 35*   CREATININE mg/dL 4.18* 6.20* 4.31* 6.44* 5.06* 5.69* 4.15*   CALCIUM mg/dL 8.2* 7.8* 7.8* 7.2* 7.7* 7.1* 6.8*   AST (SGOT) U/L  --   --   --   --   --  115*  --    ALT (SGPT) U/L  --   --   --   --   --  108*  --            Results from last 7 days  Lab Units 03/19/17  0542 03/18/17  0417 03/17/17  0537 03/16/17  0401 03/15/17  0328 03/14/17  0348 03/13/17  0431   WBC 10*3/mm3 16.39* 13.44* 11.55* 11.50* 10.30 10.19 11.15*   HEMOGLOBIN g/dL 11.4* 11.1* " 10.9* 10.2* 10.6* 10.5* 9.9*   HEMATOCRIT % 37.0 36.2 35.9 32.2* 33.8* 34.0* 32.3*   PLATELETS 10*3/mm3 257 241 249 224 223 208 200   NEUTROPHIL % %  --   --  73.9  --   --  81.3*  --    LYMPHOCYTE % %  --   --  10.6*  --   --  8.5*  --    MONOCYTES % %  --   --  8.2  --   --  6.9  --    EOSINOPHIL % %  --   --  3.7  --   --  1.3  --    BASOPHIL % %  --   --  0.3  --   --  0.2  --    IMM GRAN % %  --   --  3.3*  --   --  1.8*  --                          GLUCOSE   Date/Time Value Ref Range Status   03/19/2017 0724 124 70 - 130 mg/dL Final   03/19/2017 0434 144 (H) 70 - 130 mg/dL Final   03/19/2017 0035 103 70 - 130 mg/dL Final   03/18/2017 2004 202 (H) 70 - 130 mg/dL Final   03/18/2017 1709 146 (H) 70 - 130 mg/dL Final   03/18/2017 0731 131 (H) 70 - 130 mg/dL Final   03/18/2017 0549 123 70 - 130 mg/dL Final   03/18/2017 0217 108 70 - 130 mg/dL Final               Results from last 7 days  Lab Units 03/13/17  1818 03/13/17  1536   BLOODCX  No growth at 5 days No growth at 5 days        Results for SALLY TOLLIVER (MRN [id number]) as of 3/18/2017 15:05   Ref. Range 3/9/2017 16:44 3/10/2017 04:41 3/11/2017 03:58 3/11/2017 13:14 3/12/2017 10:22   Creatine Kinase Latest Ref Range: 20 - 180 U/L >87785 (H) >21950 (H) 22991 (H) 13615 (H) 67861 (H)             Echocardiogram: 3/10/17  · Left ventricular wall thickness is consistent with mild concentric hypertrophy.  · Left ventricular function is normal. Estimated EF = 63%.  · Left ventricular diastolic dysfunction (grade I) consistent with impaired relaxation.    Imaging Results (all)     Procedure Component Value Units Date/Time    CT Head Without Contrast [[id number]] Collected:  03/09/17 1742     Updated:  03/09/17 1750    Narrative:       CT SCAN OF THE HEAD WITHOUT CONTRAST     CLINICAL HISTORY: Patient fell 2 days ago with head trauma.     TECHNIQUE: Multiple axial 3mm thick images were acquired from the base  of the skull to the vertex without contrast.      COMPARISON: None     FINDINGS: The brain and ventricular system appear structurally normal.  There is no evidence of acute infarct or hemorrhage. There is minimal  ill-defined attenuation in the white matter of her early the left  frontal lobe is likely sequela of small vessel chronic ischemic change.  There are no masses. Bone window images show no evidence of skull  fracture.       Impression:       Evidence of minimal small vessel chronic ischemic change as  described. Otherwise unremarkable CT scan of the head.     This report was finalized on 3/9/2017 5:47 PM by Dr. Rodrigue Floyd MD.       CT Chest Without Contrast [[id number]] Collected:  03/09/17 1748    CT Abdomen Pelvis Without Contrast [[id number]] Collected:  03/09/17 1748     Updated:  03/09/17 1948    Narrative:       EXAMINATIONS: CT OF THE CHEST WITHOUT CONTRAST AND CT OF THE ABDOMEN AND  PELVIS WITHOUT CONTRAST     HISTORY: 61-year-old female with a history of trauma, found in the  bathtub for 2 days and bruising of both breasts.     TECHNIQUE: Contiguous axial images were obtained through the chest,  abdomen and pelvis without IV or oral contrast.     COMPARISON: CT of the chest, 05/04/2007 and CT of the abdomen and  pelvis, 04/03/2007.     FINDINGS OF THE CHEST: There is focal atelectasis within the right upper  lobe and to a lesser degree within the right lower lobe. The left lung  is also under aerated. No areas of consolidation are otherwise  appreciated. There is no evidence for mediastinal adenopathy. No  axillary adenopathy is appreciated. The osseous structures of the thorax  have a normal appearance. Subcutaneous gas is noted in the left chest  wall.     FINDINGS OF THE ABDOMEN AND PELVIS: A 5.9 x 4.2 cm cyst is seen at the  superior pole of the right kidney. The adrenal glands, pancreas, spleen  and liver have a normal noncontrasted appearance. The left kidney has a  normal noncontrasted appearance. No abnormality of the bowel  is  appreciated. Abundant gas is seen within the left hemiabdominal soft  tissues. The gas extends along the left lateral hemithorax/flank region  into the left lower quadrant soft tissues and left gluteal region.       Impression:       1. Extensive soft tissue gas is seen in the left flank that extends into  the left lower quadrant and left gluteal soft tissues. There is no  intraperitoneal extension but there is some gas within the left  abdominis rectus musculature. No definite penetrating injury is  appreciated. This could represent fasciitis. Clinical followup is  suggested.  2. There is no evidence for a fracture or traumatic abnormality of the  chest, abdomen or pelvis.  3. 5.9 cm cyst at the superior pole of the right kidney.  4. Mild atelectasis within the right upper lobe and/or right lower lobe.  Pneumonia cannot be completely excluded.     This report was finalized on 3/9/2017 7:45 PM by Dr. Nakul Robins MD.         XR Forearm 2 View Left [[id number]] Collected:  03/09/17 1745    Impression:       1. Soft tissue swelling of the left forearm without evidence for  underlying bony abnormality.        2. Negative left humerus radiographs.        3. Mild bilateral perihilar atelectasis.     This report was finalized on 3/9/2017 7:45 PM by Dr. Nakul Robins MD.       XR Humerus Left [[id number]] Collected:  03/09/17 1745    Impression:       1. Soft tissue swelling of the left forearm without evidence for  underlying bony abnormality.        2. Negative left humerus radiographs.        3. Mild bilateral perihilar atelectasis.     This report was finalized on 3/9/2017 7:45 PM by Dr. Nakul Robins MD.       XR Chest 1 View [[id number]] Collected:  03/12/17 1353     Updated:  03/12/17 1357    Narrative:       EMERGENCY PORTABLE CHEST SINGLE VIEW     HISTORY: Right jugular line placement     COMPARISON: 03/10/2017     FINDINGS:  1. Interval extubation.  2. Left jugular line tip is remains in good position  SVC.  3. New right jugular line terminates in the SVC good position.  4. No other change, no pneumothorax.     This report was finalized on 3/12/2017 1:54 PM by Dr. Micheal Beverly MD.       PICC W FLUORO GUIDANCE [[id number]] Resulted:  03/17/17 0807     Updated:  03/17/17 0807        I reviewed the patient's new clinical results.  I personally viewed and interpreted the patient's CXR        Medication Review:     heparin (porcine) 5,000 Units Subcutaneous Q8H   insulin aspart 0-9 Units Subcutaneous Q4H   Vancomycin Pharmacy Intermittent Dosing  Does not apply Daily         Pharmacy to dose vancomycin     sodium chloride 9 mL/hr Last Rate: 9 mL/hr (03/17/17 0600)       ASSESSMENT:   1. Acute postoperative respiratory failure. Extubated 3/12/17.  Now on room air.  2. Severe rhabdomyolysis  3. Status post radical debridement of bilateral necrotic groin wounds with no evidence of fasciitis currently receiving wound VAC dressing changes. wound culture positive for MRSA.  On vancomycin with plans to transition to oral doxycycline for a total 14 day course.  4. Oliguric acute renal failure, 2ary to severe rhabdomyolysis and dehydration.  Receiving hemodialysis  5. Third spacing  6. Lactic acidosis resolved  7. Sepsis, 2arty to #2  8. Metabolic acidosis- Jun on  9. Hypertension  10. History of cognitive dysfunction associated with prior M[place]ISON and traumatic brain injury  11. Hypocalcemia-stable  12. Morbid obesity  13. Anemia-multi-factorial.  Hemoglobin stable  14. Protein calorie malnutrition (Alb=2.4)    PLAN:  Continue vancomycin per IDs recommendation- plan to transition to oral doxycycline at discharge for a total of 14 day course.  Tolerating oral diet well.  On heparin subcutaneous for DVT prophylaxis  Continue wound VAC per wound care  Dialysis  per nephrology. Palindrome catheter placed.  Continue supportive care  Physical therapy    Disposition: plan for LTAC at discharge due to extensive need of care.  Referral has  been placed to hiren Horn MD  03/19/17  10:57 AM        BRENDON Dragon/Transcription disclaimer:   Much of this encounter note is an electronic transcription/translation of spoken language to printed text. The electronic translation of spoken language may permit erroneous, or at times, nonsensical words or phrases to be inadvertently transcribed; Although I have reviewed the note for such errors, some may still exist.

## 2017-03-18 NOTE — PROGRESS NOTES
"Pharmacokinetic Consult - Vancomycin Dosing (Follow-up Note)    Luca Amezcua is on day 10 pharmacy to dose vancomycin for SSTI.  Pharmacy dosing vancomycin per Dr. Jenkins's request.   Goal pre-HD level: 15-20 mg/L     Relevant clinical data and objective history reviewed:  61 y.o. female 64.02\" (162.6 cm) 222 lb (101 kg)    Past Medical History   Diagnosis Date   • Bleeding gastrointestinal    • Head trauma 1985     CREATININE   Date Value Ref Range Status   03/18/2017 6.20 (H) 0.57 - 1.00 mg/dL Final   03/17/2017 4.31 (H) 0.57 - 1.00 mg/dL Final   03/16/2017 6.44 (H) 0.57 - 1.00 mg/dL Final     BUN   Date Value Ref Range Status   03/18/2017 38 (H) 8 - 23 mg/dL Final     Estimated Creatinine Clearance: 10.9 mL/min (by C-G formula based on Cr of 6.2).    Lab Results   Component Value Date    WBC 13.44 (H) 03/18/2017     Temp Readings from Last 3 Encounters:   03/18/17 97.8 °F (36.6 °C) (Oral)     Baseline culture/source/susceptibility:   3/14 C diff negative   3/13 BCx NGTD x 2  3/10 OR wound cx MRSA  3/9 BCx Strep mutans x1 .     IV Anti-Infectives     Ordered     Dose/Rate Route Frequency Start Stop    03/16/17 0953  vancomycin 750 mg/250 mL 0.9% NS add-vantage     Ordering Provider:  Aguilar Jenkins MD    750 mg  over 75 Minutes Intravenous Once 03/16/17 1500 03/16/17 1630    03/16/17 0833  ceFAZolin in dextrose (ANCEF) IVPB solution 2 g     Ordering Provider:  Abdiel Monique MD    2 g Intravenous Once 03/16/17 0915 03/16/17 0957    03/13/17 0816  vancomycin 500 mg/100 mL 0.9% NS IVPB (mbp)     Ordering Provider:  Aguilar Jenkins MD    500 mg  over 50 Minutes Intravenous Once 03/13/17 0900 03/13/17 1111    03/10/17 1401  [MAR Hold]  vancomycin 500 mg/100 mL 0.9% NS IVPB (mbp)     (MAR Hold since 03/10/17 1437)   Ordering Provider:  Aguilar Jenkins MD    500 mg  over 50 Minutes Intravenous Once 03/10/17 1600 03/10/17 1750    03/09/17 1817  Vancomycin Pharmacy Intermittent Dosing     Ordering " Provider:  Aguilar Jenkins MD     Does not apply Daily 03/09/17 1819 03/09/17 1807  Pharmacy to dose vancomycin     Ordering Provider:  Aguilar Jenkins MD     Does not apply Continuous PRN 03/09/17 1806 03/09/17 1738  vancomycin 2250 mg/500 mL 0.9% NS IVPB ([facility])     Ordering Provider:  Gamal Olguin MD    20 mg/kg × 113 kg Intravenous Once 03/09/17 1800 03/09/17 1759    03/09/17 1758  clindamycin (CLEOCIN) 600 mg in dextrose 5% 50 mL IVPB (premix)     Ordering Provider:  Gamal Olguin MD    600 mg Intravenous Once 03/09/17 1800 03/09/17 1810 03/09/17 1612  piperacillin-tazobactam (ZOSYN) 4.5 g in dextrose 100 mL IVPB (premix)     Ordering Provider:  Gamal Olguin MD    4.5 g Intravenous Once 03/09/17 1614 03/09/17 1759         No results found for: CORAZON  Lab Results   Component Value Date    VANCORANDOM 14.80 03/18/2017     Vancomycin dosing history:   3/9: 2250mg IV x1 @ 1759  3/10 random: 24.6 mcg/ml, given 500mg IV x1 @ 1700  3/11 0529 Vancomycin random level = 27.6 mcg/ml  3/12 428 Random level = 22.1, no dose given, received HD x1  3/13 0431 random level= 17.2 mcg/ml, 500mg IV x1 @ 1021  3/14 random: 25 mcg/ml, no dose given, HD x 4 hr  3/15 random: 17.2 mcg/ml, no dose  3/16: HD planned today. Give 750mg IV x1 after dialysis today.    Assessment/Plan  Vancomycin random level = 14.8 mcg/mL this am at 04:18. HD planned for today.  Per random level will give Vancomycin 1000mg IV today after HD. Vancomycin random level to be drawn on 3/20/17 with am labs. Pharmacy will continue to follow daily while on vancomycin and adjust as needed.  Thank you for consult    Celia Castillo, R.Ph.  Clinical Staff Pharmacist

## 2017-03-19 LAB
ANION GAP SERPL CALCULATED.3IONS-SCNC: 18.1 MMOL/L
BUN BLD-MCNC: 21 MG/DL (ref 8–23)
BUN/CREAT SERPL: 5 (ref 7–25)
CALCIUM SPEC-SCNC: 8.2 MG/DL (ref 8.6–10.5)
CHLORIDE SERPL-SCNC: 97 MMOL/L (ref 98–107)
CO2 SERPL-SCNC: 22.9 MMOL/L (ref 22–29)
CREAT BLD-MCNC: 4.18 MG/DL (ref 0.57–1)
DEPRECATED RDW RBC AUTO: 50.4 FL (ref 37–54)
ERYTHROCYTE [DISTWIDTH] IN BLOOD BY AUTOMATED COUNT: 16.7 % (ref 11.7–13)
GFR SERPL CREATININE-BSD FRML MDRD: 11 ML/MIN/1.73
GLUCOSE BLD-MCNC: 133 MG/DL (ref 65–99)
GLUCOSE BLDC GLUCOMTR-MCNC: 103 MG/DL (ref 70–130)
GLUCOSE BLDC GLUCOMTR-MCNC: 124 MG/DL (ref 70–130)
GLUCOSE BLDC GLUCOMTR-MCNC: 140 MG/DL (ref 70–130)
GLUCOSE BLDC GLUCOMTR-MCNC: 141 MG/DL (ref 70–130)
GLUCOSE BLDC GLUCOMTR-MCNC: 144 MG/DL (ref 70–130)
GLUCOSE BLDC GLUCOMTR-MCNC: 154 MG/DL (ref 70–130)
HCT VFR BLD AUTO: 37 % (ref 35.6–45.5)
HGB BLD-MCNC: 11.4 G/DL (ref 11.9–15.5)
MCH RBC QN AUTO: 26.4 PG (ref 26.9–32)
MCHC RBC AUTO-ENTMCNC: 30.8 G/DL (ref 32.4–36.3)
MCV RBC AUTO: 85.6 FL (ref 80.5–98.2)
PLATELET # BLD AUTO: 257 10*3/MM3 (ref 140–500)
PMV BLD AUTO: 9.9 FL (ref 6–12)
POTASSIUM BLD-SCNC: 4.2 MMOL/L (ref 3.5–5.2)
RBC # BLD AUTO: 4.32 10*6/MM3 (ref 3.9–5.2)
SODIUM BLD-SCNC: 138 MMOL/L (ref 136–145)
WBC NRBC COR # BLD: 16.39 10*3/MM3 (ref 4.5–10.7)

## 2017-03-19 PROCEDURE — 87040 BLOOD CULTURE FOR BACTERIA: CPT | Performed by: INTERNAL MEDICINE

## 2017-03-19 PROCEDURE — 97110 THERAPEUTIC EXERCISES: CPT

## 2017-03-19 PROCEDURE — 82962 GLUCOSE BLOOD TEST: CPT

## 2017-03-19 PROCEDURE — 80048 BASIC METABOLIC PNL TOTAL CA: CPT | Performed by: INTERNAL MEDICINE

## 2017-03-19 PROCEDURE — 85027 COMPLETE CBC AUTOMATED: CPT | Performed by: INTERNAL MEDICINE

## 2017-03-19 PROCEDURE — 63710000001 INSULIN ASPART PER 5 UNITS: Performed by: INTERNAL MEDICINE

## 2017-03-19 PROCEDURE — 25010000002 HEPARIN (PORCINE) PER 1000 UNITS: Performed by: INTERNAL MEDICINE

## 2017-03-19 PROCEDURE — 99233 SBSQ HOSP IP/OBS HIGH 50: CPT | Performed by: INTERNAL MEDICINE

## 2017-03-19 RX ADMIN — HEPARIN SODIUM 5000 UNITS: 5000 INJECTION, SOLUTION INTRAVENOUS; SUBCUTANEOUS at 06:01

## 2017-03-19 RX ADMIN — INSULIN ASPART 2 UNITS: 100 INJECTION, SOLUTION INTRAVENOUS; SUBCUTANEOUS at 18:32

## 2017-03-19 RX ADMIN — HEPARIN SODIUM 5000 UNITS: 5000 INJECTION, SOLUTION INTRAVENOUS; SUBCUTANEOUS at 14:32

## 2017-03-19 RX ADMIN — HEPARIN SODIUM 5000 UNITS: 5000 INJECTION, SOLUTION INTRAVENOUS; SUBCUTANEOUS at 21:23
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
## 2017-03-19 NOTE — PROGRESS NOTES
Acute Care - Physical Therapy Treatment Note  [facility]     Patient Name: Luca Amezcua  : 1955  MRN: [id number]  Today's Date: 3/19/2017  Onset of Illness/Injury or Date of Surgery Date: 17  Date of Referral to PT: 17  Referring Physician: Dr. Nicholas [facility]    Admit Date: 3/9/2017    Visit Dx:    ICD-10-CM ICD-9-CM   1. Acute renal failure, unspecified acute renal failure type N17.9 584.9   2. Fall, initial encounter W19.XXXA E888.9   3. Sepsis, due to unspecified organism A41.9 038.9     995.91   4. Metabolic acidosis E87.2 276.2   5. Hypothermia, initial encounter T68.XXXA 991.6   6. Necrotizing fasciitis M72.6 728.86   7. Traumatic rhabdomyolysis, initial encounter T79.6XXA 958.6     Patient Active Problem List   Diagnosis   • Sepsis   • Renal failure, acute               Adult Rehabilitation Note       17 1542 17 1300       Rehab Assessment/Intervention    Discipline physical therapist  -AR physical therapy assistant  -CW     Document Type therapy note (daily note)  -AR therapy note (daily note)  -CW     Subjective Information agree to therapy  -AR agree to therapy;complains of;weakness  -CW     Patient Effort, Rehab Treatment good  -AR adequate  -CW     Symptoms Noted During/After Treatment fatigue  -AR      Precautions/Limitations fall precautions  -AR fall precautions  -CW     Recorded by [AR] Carolyn Joe, PT [CW] Tl Oro     Pain Assessment    Pain Assessment No/denies pain  -AR No/denies pain  -CW     Recorded by [AR] Carolyn Joe, PT [CW] Tl Oro     Cognitive Assessment/Intervention    Current Cognitive/Communication Assessment did not assess  -AR impaired  -CW     Orientation Status oriented x 4  -AR oriented to;person;place  -CW     Follows Commands/Answers Questions 100% of the time  -% of the time;able to follow single-step instructions;needs cueing  -CW     Personal Safety WNL/WFL  -AR mild impairment;decreased awareness, need  for safety;decreased insight to deficits  -CW     Personal Safety Interventions fall prevention program maintained;gait belt  -AR fall prevention program maintained;gait belt;nonskid shoes/slippers when out of bed  -CW     Recorded by [AR] Carolyn Joe, PT [CW] Tl Oro     Bed Mobility, Assessment/Treatment    Bed Mobility, Assistive Device bed rails;head of bed elevated  -AR      Bed Mob, Supine to Sit, [place] minimum assist (75% patient effort)  -AR minimum assist (75% patient effort)  -CW     Bed Mob, Sit to Supine, [place] moderate assist (50% patient effort)  -AR minimum assist (75% patient effort)  -CW     Recorded by [AR] Carolyn Joe, PT [CW] lT Oro     Transfer Assessment/Treatment    Transfers, Sit-Stand [place] minimum assist (75% patient effort)  -AR contact guard assist;2 person assist required  -CW     Transfers, Stand-Sit [place] minimum assist (75% patient effort)  -AR contact guard assist;2 person assist required  -CW     Transfers, Sit-Stand-Sit, Assist Device rolling walker  -AR rolling walker  -CW     Recorded by [AR] Carolyn Joe, PT [CW] Tl Oro     Gait Assessment/Treatment    Gait, [place] Level minimum assist (75% patient effort)  -AR contact guard assist;2 person assist required  -CW     Gait, Assistive Device rolling walker  -AR rolling walker  -CW     Gait, Distance (Feet) 12  -AR 8  -CW     Gait, Gait Pattern Analysis swing-through gait  -AR      Gait, Gait Deviations rafal decreased;decreased heel strike;forward flexed posture  -AR rafal decreased;step length decreased;stride length decreased  -CW     Gait, Safety Issues step length decreased;weight-shifting ability decreased  -AR      Gait, Impairments impaired balance;strength decreased  -AR      Recorded by [AR] Carolyn Joe, PT [CW] Tl Oro     Therapy Exercises    Bilateral Lower Extremities  AROM:;LAQ;10 reps;sitting;ankle pumps/circles  -CW      Recorded by  [CW] Tl Oro     Positioning and Restraints    Pre-Treatment Position in bed  -AR in bed  -CW     Post Treatment Position bed  -AR bed  -CW     In Bed supine;call light within reach;encouraged to call for assist;exit alarm on  -AR notified nsg;supine;call light within reach;encouraged to call for assist;exit alarm on  -CW     Recorded by [AR] Carolyn Joe, PT [CW] Tl Oro       User Key  (r) = Recorded By, (t) = Taken By, (c) = Cosigned By    Initials Name Effective Dates    AR Carolyn Joe, PT 06/27/16 -     CW Tl Oro 12/13/16 -                 IP PT Goals       03/13/17 1158          Bed Mobility PT LTG    Bed Mobility PT LTG, Date Established 03/13/17  -MS      Bed Mobility PT LTG, Time to Achieve 5 - 7 days  -MS      Bed Mobility PT LTG, Activity Type all bed mobility  -MS      Bed Mobility PT LTG, [place] Level minimum assist (75% patient effort)  -MS      Transfer Training PT LTG    Transfer Training PT LTG, Date Established 03/13/17  -MS      Transfer Training PT LTG, Time to Achieve 5 - 7 days  -MS      Transfer Training PT LTG, Activity Type bed to chair /chair to bed;sit to stand/stand to sit  -MS      Transfer Training PT LTG, [place] Level minimum assist (75% patient effort)  -MS      Transfer Training PT LTG, Assist Device --   With A.A.D.  -MS      Gait Training PT LTG    Gait Training Goal PT LTG, Date Established 03/13/17  -MS      Gait Training Goal PT LTG, Time to Achieve 5 - 7 days  -MS      Gait Training Goal PT LTG, [place] Level minimum assist (75% patient effort)  -MS      Gait Training Goal PT LTG, Assist Device --   With A.A.D.  -MS      Gait Training Goal PT LTG, Distance to Achieve 100 feet  -MS      Dynamic Sitting Balance PT LTG    Dynamic Sitting Balance PT LTG, Date Established 03/13/17  -MS      Dynamic Sitting Balance PT LTG, Time to Achieve 5 - 7 days  -MS      Dynamic Sitting Balance PT LTG, [place] Level  independent  -MS      Dynamic Sitting Balance PT LTG, Assist Device UE Support  -MS        User Key  (r) = Recorded By, (t) = Taken By, (c) = Cosigned By    Initials Name Provider Type    MS Abdiel LOCKWOOD Joselito, PT Physical Therapist          Physical Therapy Education     Title: PT OT SLP Therapies (Active)     Topic: Physical Therapy (Active)     Point: Mobility training (Active)    Learning Progress Summary    Learner Readiness Method Response Comment Documented by Status   Patient Acceptance E NR  AR 03/19/17 1544 Active    Acceptance E,TB DU,VU  CW 03/17/17 1338 Done    Acceptance E,TB,D VU,NR  CH 03/14/17 1655 Done    Acceptance E,D NR Pt. is Hard of hearing; Must speak louder and clearly for pt. to hear you. MS 03/13/17 1157 Active               Point: Home exercise program (Active)    Learning Progress Summary    Learner Readiness Method Response Comment Documented by Status   Patient Acceptance E NR  AR 03/19/17 1544 Active    Acceptance E,TB DU,VU  CW 03/17/17 1338 Done    Acceptance E,D NR Pt. is Hard of hearing; Must speak louder and clearly for pt. to hear you. MS 03/13/17 1157 Active               Point: Body mechanics (Active)    Learning Progress Summary    Learner Readiness Method Response Comment Documented by Status   Patient Acceptance E NR  AR 03/19/17 1544 Active    Acceptance E,TB DU,VU  CW 03/17/17 1338 Done    Acceptance E,TB,D VU,NR  CH 03/14/17 1655 Done    Acceptance E,D NR Pt. is Hard of hearing; Must speak louder and clearly for pt. to hear you. MS 03/13/17 1157 Active               Point: Precautions (Active)    Learning Progress Summary    Learner Readiness Method Response Comment Documented by Status   Patient Acceptance E NR  AR 03/19/17 1544 Active    Acceptance E,TB DU,VU  CW 03/17/17 1338 Done    Acceptance E,TB,D VU,NR  CH 03/14/17 1655 Done    Acceptance E,D NR Pt. is Hard of hearing; Must speak louder and clearly for pt. to hear you. MS 03/13/17 1157 Active                       User Key     Initials Effective Dates Name Provider Type Discipline     12/01/15 -  Katie Hagan, PT Physical Therapist PT    MS 12/01/15 -  Abdiel Yee, PT Physical Therapist PT    AR 06/27/16 -  Carolyn Joe, PT Physical Therapist PT    CW 12/13/16 -  Tl Oro Physical Therapy Assistant PT                    PT Recommendation and Plan  Anticipated Discharge Disposition: skilled nursing facility  Planned Therapy Interventions: balance training, bed mobility training, gait training, patient/family education, postural re-education, ROM (Range of Motion), strengthening, transfer training  PT Frequency: daily  Plan of Care Review  Plan Of Care Reviewed With: patient  Outcome Summary/Follow up Plan: Slowly improving independence w/ mobility and activity tolerance; ambulated 12' using walker w/ minimal assist. Discussed activity recommendations and exercises to perform independently.            Outcome Measures       03/19/17 1500 03/17/17 1300       How much help from another person do you currently need...    Turning from your back to your side while in flat bed without using bedrails? 3  -AR 3  -CW     Moving from lying on back to sitting on the side of a flat bed without bedrails? 3  -AR 3  -CW     Moving to and from a bed to a chair (including a wheelchair)? 3  -AR 3  -CW     Standing up from a chair using your arms (e.g., wheelchair, bedside chair)? 3  -AR 3  -CW     Climbing 3-5 steps with a railing? 1  -AR 1  -CW     To walk in hospital room? 3  -AR 3  -CW     AM-PAC 6 Clicks Score 16  -AR 16  -CW     Functional Assessment    Outcome Measure Options  AM-PAC 6 Clicks Basic Mobility (PT)  -CW       User Key  (r) = Recorded By, (t) = Taken By, (c) = Cosigned By    Initials Name Provider Type    AR Carolyn Joe, PT Physical Therapist    CW Tl Oro Physical Therapy Assistant           Time Calculation:         PT Charges       03/19/17 4895          Time Calculation    Start Time  1530  -AR      Stop Time 1545  -AR      Time Calculation (min) 15 min  -AR      PT Received On 03/19/17  -AR      PT - Next Appointment 03/20/17  -AR        User Key  (r) = Recorded By, (t) = Taken By, (c) = Cosigned By    Initials Name Provider Type    AR Carolyn Joe PT Physical Therapist          Therapy Charges for Today     Code Description Service Date Service Provider Modifiers Qty    55985106666 HC PT THER PROC EA 15 MIN 3/19/2017 Carolyn Joe, PT GP 1    47825430966 HC PT THER SUPP EA 15 MIN 3/19/2017 Carolyn Joe PT GP 1          PT G-Codes  Outcome Measure Options: AM-PAC 6 Clicks Basic Mobility (PT)    Carolyn Joe PT  3/19/2017
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
## 2017-03-19 NOTE — PLAN OF CARE
Problem: Perioperative Period (Adult)  Goal: Signs and Symptoms of Listed Potential Problems Will be Absent or Manageable (Perioperative Period)  Outcome: Ongoing (interventions implemented as appropriate)    03/19/17 0833   Perioperative Period   Problems Assessed (Perioperative Period) all   Problems Present (Perioperative Period) wound complications

## 2017-03-19 NOTE — PLAN OF CARE
Problem: Patient Care Overview (Adult)  Goal: Plan of Care Review  Outcome: Ongoing (interventions implemented as appropriate)    03/19/17 0815   Coping/Psychosocial Response Interventions   Plan Of Care Reviewed With patient   Outcome Evaluation   Outcome Summary/Follow up Plan Skin care, q 2 turns, monitor O2, v/s       Goal: Adult Individualization and Mutuality  Outcome: Ongoing (interventions implemented as appropriate)  Goal: Discharge Needs Assessment  Outcome: Ongoing (interventions implemented as appropriate)

## 2017-03-19 NOTE — PLAN OF CARE
Problem: Patient Care Overview (Adult)  Goal: Plan of Care Review    03/19/17 1545   Coping/Psychosocial Response Interventions   Plan Of Care Reviewed With patient   Outcome Evaluation   Outcome Summary/Follow up Plan Slowly improving independence w/ mobility and activity tolerance; ambulated 12' using walker w/ minimal assist. Discussed activity recommendations and exercises to perform independently.

## 2017-03-19 NOTE — PROGRESS NOTES
LOS: 10 days     Chief Complaint:  MRSA wound infection    Interval History:  Afebrile, no new complaints. Continues to have diarrhea. Abdominal cramping present. No N/V. Good appetite. No SOB or cough. No sore throat or rhinorrhea.     Vital Signs  Temp:  [96.6 °F (35.9 °C)-98.1 °F (36.7 °C)] 96.6 °F (35.9 °C)  Heart Rate:  [] 103  Resp:  [16] 16  BP: (120-162)/(72-98) 162/98  95% on RA    Physical Exam:  General: In no acute distress  HEENT: Oropharynx clear, moist mucous membranes  Cardiovascular: RRR, normal S1 and S2, no M/R/G  Respiratory: Clear to ascultation bilaterally, no wheezing  GI: Soft, NT/ND, + bowel sounds bilaterally, no masses  Skin: No rashes or lesions, wound vac in place in b/l groin areas   Extremities: No edema, cyanosis    Antibiotics:  Vancomycin dosing per pharmacy     Results Review:     I reviewed the patient's new clinical results.  I reviewed the patient's new imaging results and agree with the interpretation.    Lab Results   Component Value Date    WBC 16.39 (H) 03/19/2017    HGB 11.4 (L) 03/19/2017    HCT 37.0 03/19/2017    MCV 85.6 03/19/2017     03/19/2017     Lab Results   Component Value Date    GLUCOSE 133 (H) 03/19/2017    BUN 21 03/19/2017    CREATININE 4.18 (H) 03/19/2017    EGFRIFNONA 11 (L) 03/19/2017    BCR 5.0 (L) 03/19/2017    CO2 22.9 03/19/2017    CALCIUM 8.2 (L) 03/19/2017    ALBUMIN 2.80 (L) 03/14/2017    LABIL2 0.9 03/11/2017     (H) 03/14/2017     (H) 03/14/2017       Microbiology:  3/14 C diff negative   3/13 BCx negative x 2  3/10 OR wound cx MRSA and CoNS           Fungal P  3/9 BCx Strep mutans x1     Assessment/Plan   This is a 61-year-old female who fell 2 days prior to admission in the shower and was unable to get up or get help for the last 2 days until being found by EMS. Physical exam revealed necrotizing wounds over her groin area. She underwent debridement on March 10 with necrosis seen of the subcutaneous tissue sparing the  muscle. Operative cultures are growing MRSA.  Wounds at this point are looking well. The plan is for her wound VAC placement. As long as the patient continues to do well clinically anticipate 10-14 days of IV treatment follow by oral therapy deemed necessary by surgery.     1.  Necrotizing soft tissue infection status post debridement on March 10  - Continue vancomycin day 9  - would anticipate switching to oral therapy (doxycycline 100mg PO BID)  at discharge. Would treatment for a minimum of 14 days total but this can be extended per surgery if needed.   - Management per surgery    2. Leukocytosis - unclear why it is increasing  - continue to monitor  - check LFT's in the am  - obtain cbc with differential tomorrow   - check blood cx x 2 today      3.  Blood culture positive for strep mutans - most likely a contaminant but unable to determine that his only 1 blood culture was drawn on admission  - Repeat blood cultures ×2 are negative      4.  Acute kidney injury now on hemodialysis  - Management per nephrology     5.  Severe rhabdomyolysis improving

## 2017-03-20 LAB
ALBUMIN SERPL-MCNC: 2.9 G/DL (ref 3.5–5.2)
ALP SERPL-CCNC: 130 U/L (ref 39–117)
ALT SERPL W P-5'-P-CCNC: 22 U/L (ref 1–33)
ANION GAP SERPL CALCULATED.3IONS-SCNC: 19.3 MMOL/L
AST SERPL-CCNC: 25 U/L (ref 1–32)
BILIRUB CONJ SERPL-MCNC: <0.2 MG/DL (ref 0–0.3)
BILIRUB INDIRECT SERPL-MCNC: ABNORMAL MG/DL
BILIRUB SERPL-MCNC: 0.2 MG/DL (ref 0.1–1.2)
BUN BLD-MCNC: 40 MG/DL (ref 8–23)
BUN/CREAT SERPL: 6.1 (ref 7–25)
CALCIUM SPEC-SCNC: 8.1 MG/DL (ref 8.6–10.5)
CHLORIDE SERPL-SCNC: 97 MMOL/L (ref 98–107)
CO2 SERPL-SCNC: 21.7 MMOL/L (ref 22–29)
CREAT BLD-MCNC: 6.56 MG/DL (ref 0.57–1)
DEPRECATED RDW RBC AUTO: 50.6 FL (ref 37–54)
ERYTHROCYTE [DISTWIDTH] IN BLOOD BY AUTOMATED COUNT: 17.1 % (ref 11.7–13)
GFR SERPL CREATININE-BSD FRML MDRD: 6 ML/MIN/1.73
GLUCOSE BLD-MCNC: 118 MG/DL (ref 65–99)
GLUCOSE BLDC GLUCOMTR-MCNC: 108 MG/DL (ref 70–130)
GLUCOSE BLDC GLUCOMTR-MCNC: 135 MG/DL (ref 70–130)
GLUCOSE BLDC GLUCOMTR-MCNC: 191 MG/DL (ref 70–130)
GLUCOSE BLDC GLUCOMTR-MCNC: 192 MG/DL (ref 70–130)
HCT VFR BLD AUTO: 34 % (ref 35.6–45.5)
HGB BLD-MCNC: 10.6 G/DL (ref 11.9–15.5)
MCH RBC QN AUTO: 26 PG (ref 26.9–32)
MCHC RBC AUTO-ENTMCNC: 31.2 G/DL (ref 32.4–36.3)
MCV RBC AUTO: 83.5 FL (ref 80.5–98.2)
PLATELET # BLD AUTO: 257 10*3/MM3 (ref 140–500)
PMV BLD AUTO: 9.4 FL (ref 6–12)
POTASSIUM BLD-SCNC: 4 MMOL/L (ref 3.5–5.2)
PROT SERPL-MCNC: 6.2 G/DL (ref 6–8.5)
RBC # BLD AUTO: 4.07 10*6/MM3 (ref 3.9–5.2)
SODIUM BLD-SCNC: 138 MMOL/L (ref 136–145)
VANCOMYCIN SERPL-MCNC: 23.7 MCG/ML (ref 5–40)
WBC NRBC COR # BLD: 15.65 10*3/MM3 (ref 4.5–10.7)

## 2017-03-20 PROCEDURE — 97110 THERAPEUTIC EXERCISES: CPT

## 2017-03-20 PROCEDURE — 82962 GLUCOSE BLOOD TEST: CPT

## 2017-03-20 PROCEDURE — 25010000002 HEPARIN (PORCINE) PER 1000 UNITS: Performed by: INTERNAL MEDICINE

## 2017-03-20 PROCEDURE — 80076 HEPATIC FUNCTION PANEL: CPT | Performed by: INTERNAL MEDICINE

## 2017-03-20 PROCEDURE — 80048 BASIC METABOLIC PNL TOTAL CA: CPT | Performed by: INTERNAL MEDICINE

## 2017-03-20 PROCEDURE — 25010000002 VANCOMYCIN PER 500 MG: Performed by: INTERNAL MEDICINE

## 2017-03-20 PROCEDURE — 85027 COMPLETE CBC AUTOMATED: CPT | Performed by: INTERNAL MEDICINE

## 2017-03-20 PROCEDURE — 63710000001 INSULIN ASPART PER 5 UNITS: Performed by: INTERNAL MEDICINE

## 2017-03-20 PROCEDURE — 80202 ASSAY OF VANCOMYCIN: CPT | Performed by: INTERNAL MEDICINE

## 2017-03-20 PROCEDURE — 99232 SBSQ HOSP IP/OBS MODERATE 35: CPT | Performed by: INTERNAL MEDICINE

## 2017-03-20 RX ORDER — HEPARIN SODIUM 1000 [USP'U]/ML
10000 INJECTION, SOLUTION INTRAVENOUS; SUBCUTANEOUS AS NEEDED
Start: 2017-03-20 | End: 2017-05-08 | Stop reason: DRUGHIGH

## 2017-03-20 RX ORDER — VANCOMYCIN HYDROCHLORIDE 1 G/200ML
1000 INJECTION, SOLUTION INTRAVENOUS ONCE
Status: COMPLETED | OUTPATIENT
Start: 2017-03-20 | End: 2017-03-20

## 2017-03-20 RX ORDER — DOXYCYCLINE HYCLATE 100 MG/1
100 CAPSULE ORAL 2 TIMES DAILY
Qty: 8 CAPSULE | Refills: 0
Start: 2017-03-20 | End: 2017-03-24

## 2017-03-20 RX ORDER — HEPARIN SODIUM 5000 [USP'U]/ML
5000 INJECTION, SOLUTION INTRAVENOUS; SUBCUTANEOUS EVERY 8 HOURS SCHEDULED
Start: 2017-03-20 | End: 2017-08-02

## 2017-03-20 RX ORDER — ACETAMINOPHEN 325 MG/1
650 TABLET ORAL EVERY 4 HOURS PRN
Refills: 0
Start: 2017-03-20 | End: 2017-05-08

## 2017-03-20 RX ADMIN — VANCOMYCIN HYDROCHLORIDE 1000 MG: 1 INJECTION, SOLUTION INTRAVENOUS at 18:03

## 2017-03-20 RX ADMIN — HYDROCODONE BITARTRATE AND ACETAMINOPHEN 1 TABLET: 5; 325 TABLET ORAL at 11:36

## 2017-03-20 RX ADMIN — INSULIN ASPART 2 UNITS: 100 INJECTION, SOLUTION INTRAVENOUS; SUBCUTANEOUS at 20:48

## 2017-03-20 RX ADMIN — HEPARIN SODIUM 5000 UNITS: 5000 INJECTION, SOLUTION INTRAVENOUS; SUBCUTANEOUS at 18:03

## 2017-03-20 RX ADMIN — HEPARIN SODIUM 5000 UNITS: 5000 INJECTION, SOLUTION INTRAVENOUS; SUBCUTANEOUS at 05:33

## 2017-03-20 RX ADMIN — INSULIN ASPART 2 UNITS: 100 INJECTION, SOLUTION INTRAVENOUS; SUBCUTANEOUS at 05:32

## 2017-03-20 NOTE — PLAN OF CARE
Problem: Patient Care Overview (Adult)  Goal: Plan of Care Review  Outcome: Ongoing (interventions implemented as appropriate)    03/20/17 8747   Coping/Psychosocial Response Interventions   Plan Of Care Reviewed With patient   Patient Care Overview   Progress progress toward functional goals as expected   Outcome Evaluation   Outcome Summary/Follow up Plan Pt increased with cognitive awareness and is able to follow commands and sit EOB with no A

## 2017-03-20 NOTE — PROGRESS NOTES
"Pharmacokinetic Consult - Vancomycin Dosing (Follow-up Note)    Luca Amezcua is on day 12 pharmacy to dose vancomycin for SSTI (MRSA wound infection) per consult of Dr. Jenkins.  Goal pre-HD level: 20-25 mg/L     Relevant clinical data and objective history reviewed:  61 y.o. female 64.02\" (162.6 cm) 224 lb 13.9 oz (102 kg)    Past Medical History   Diagnosis Date   • Bleeding gastrointestinal    • Head trauma 1985     CREATININE   Date Value Ref Range Status   03/20/2017 6.56 (H) 0.57 - 1.00 mg/dL Final   03/19/2017 4.18 (H) 0.57 - 1.00 mg/dL Final   03/18/2017 6.20 (H) 0.57 - 1.00 mg/dL Final     BUN   Date Value Ref Range Status   03/20/2017 40 (H) 8 - 23 mg/dL Final     Estimated Creatinine Clearance: 10.3 mL/min (by C-G formula based on Cr of 6.56).    Lab Results   Component Value Date    WBC 15.65 (H) 03/20/2017     Temp Readings from Last 3 Encounters:   03/20/17 97.6 °F (36.4 °C) (Oral)       IV Anti-Infectives     Ordered     Dose/Rate Route Frequency Start Stop    03/20/17 1103  vancomycin (VANCOCIN) IVPB 1 g (premix) in Dextrose 5% 200 mL     Ordering Provider:  Aguilar Jenkins MD    1,000 mg Intravenous Once 03/20/17 1500      03/09/17 1817  Vancomycin Pharmacy Intermittent Dosing     Ordering Provider:  Aguilar Jenkins MD     Does not apply Daily 03/09/17 1819      03/09/17 1807  Pharmacy to dose vancomycin     Ordering Provider:  Aguilar Jenkins MD     Does not apply Continuous PRN 03/09/17 1806           Lab Results   Component Value Date    VANCORANDOM 23.70 03/20/2017       Assessment/Plan  Pharmacy is pulse dosing vancomycin per levels for HD patient.  Last HD on 3/18, vancomycin 1gm IV x 1 given after HD.  Random vancomycin level= 23.7mcg/ml this AM. HD scheduled for today. Ordered vancomycin 1gm IV x 1 after HD today, random level in AM 3/22.  Pharmacy will continue to follow daily while on vancomycin and adjust as needed.     Rebeca Soto, PharmD  3/20/2017  11:07 AM    "

## 2017-03-20 NOTE — SIGNIFICANT NOTE
03/20/17 1018   Rehab Treatment   Discipline physical therapy assistant   Treatment Not Performed patient/family declined treatment  (Pt want PT to come back in PM)   Recommendation   PT - Next Appointment 03/20/17

## 2017-03-20 NOTE — PROGRESS NOTES
LOS: 11 days     Chief Complaint:  MRSA wound infection    Interval History:  Afebrile, feels tired today. States she did not sleep well. Diarrhea continues. No N/V. No abd pain. No SOB or cough. No sore throat or rhinorrhea.     Vital Signs  Temp:  [97.2 °F (36.2 °C)] 97.2 °F (36.2 °C)  Heart Rate:  [100] 100  Resp:  [16] 16  BP: (137)/(85) 137/85  95% on RA    Physical Exam:  General: In no acute distress  HEENT: Oropharynx clear, moist mucous membranes  Cardiovascular: Tachy,  no M/R/G  Respiratory: Clear to ascultation bilaterally, no wheezing  GI: Soft, NT/ND, + bowel sounds bilaterally, no masses  Skin: No rashes or lesions, wound vac in place in b/l groin areas   Extremities: No edema, cyanosis    Antibiotics:  Vancomycin dosing per pharmacy     Results Review:     I reviewed the patient's new clinical results.  I reviewed the patient's new imaging results and agree with the interpretation.    Lab Results   Component Value Date    WBC 15.65 (H) 03/20/2017    HGB 10.6 (L) 03/20/2017    HCT 34.0 (L) 03/20/2017    MCV 83.5 03/20/2017     03/20/2017     Lab Results   Component Value Date    GLUCOSE 118 (H) 03/20/2017    BUN 40 (H) 03/20/2017    CREATININE 6.56 (H) 03/20/2017    EGFRIFNONA 6 (L) 03/20/2017    BCR 6.1 (L) 03/20/2017    CO2 21.7 (L) 03/20/2017    CALCIUM 8.1 (L) 03/20/2017    ALBUMIN 2.90 (L) 03/20/2017    LABIL2 0.9 03/11/2017    AST 25 03/20/2017    ALT 22 03/20/2017     Vanc trough 23.70    Microbiology:  3/19 BCx NGTD x 2  3/14 C diff negative   3/13 BCx negative x 2  3/10 OR wound cx MRSA and CoNS           Fungal P  3/9 BCx Strep mutans x1     Assessment/Plan   This is a 61-year-old female who fell 2 days prior to admission in the shower and was unable to get up or get help for the last 2 days until being found by EMS. Physical exam revealed necrotizing wounds over her groin area. She underwent debridement on March 10 with necrosis seen of the subcutaneous tissue sparing the muscle.  Operative cultures are growing MRSA.  Wounds at this point are looking well. The plan is for her wound VAC placement. As long as the patient continues to do well clinically anticipate 10-14 days of IV treatment follow by oral therapy deemed necessary by surgery.     1.  Necrotizing soft tissue infection status post debridement on March 10  - Continue vancomycin day 10 . High level will adjust dose per pharmacy   - would anticipate switching to oral therapy (doxycycline 100mg PO BID)  at discharge. Would treatment for a minimum of 14 days total but this can be extended per surgery if needed.   - Management per surgery    2. Leukocytosis - decreased  - continue to monitor  - repeat blood cx NGTD  - no signs of infection     3.  Blood culture positive for strep mutans - most likely a contaminant but unable to determine that his only 1 blood culture was drawn on admission  - Repeat blood cultures ×2 are negative      4.  Acute kidney injury now on hemodialysis  - Management per nephrology     5.  Severe rhabdomyolysis improving    6. Elevated LFT's - resolved

## 2017-03-20 NOTE — PROGRESS NOTES
PROGRESS NOTE   LOS: 11 days   Patient Care Team:  No Known Provider as PCP - General    Chief Complaint: Rhabdomyolysis, renal failure, sepsis, MRSA wound with necrotizing fasciitis    Interval History: Patient was admitted on 3/9/17 after sustaining a fall while in the shower with the water running and was down for about 3 days.  She was hypothermic and initially was warmed.  She had an open wound infection of bilateral groin and ended up having a radical debridement of bilateral necrotic groin lesions down to the subcutaneous tissue on 3/10/17.  She was treated with vancomycin and Zosyn.  First blood culture from 3/9/17 showed positive for strep mutans, but further blood cultures have shown no growth to date.    She was thought to have sepsis secondary to her soft tissue infection and was treated with IV fluid despite having thirds a sitting.  She was also treated with IV bicarbonate drip due to lactic acidosis.  After surgery on 3/10/17 she remained on the ventilator due to acute hypoxic respiratory failure.  She was also started on Levophed due to hypotension.  She was extubated on 3/12/17.  Wound culture from surgery pathology showed positive for MRSA and infectious disease was consulted and vancomycin was continued. Blood pressures improved and have been elevated- nephrology adjusted medications.     She also had acute renal failure and was initially oliguric.  She had a right IJ Shiley hemodialysis catheter placed on 3/10/17 and began hemodialysis on 3/14/17.  She ended up having a palindrome catheter inserted in right IJ on 3/16/17 by Dr. Snow. HD per nephrology.     She is working with physical therapy, she is on room air, she is tolerating by mouth and overall looking better.    REVIEW OF SYSTEMS:   CARDIOVASCULAR: No chest pain, chest pressure or chest discomfort. No palpitations or edema.   RESPIRATORY: No shortness of breath, cough or sputum.   GASTROINTESTINAL: No anorexia, nausea, vomiting or  "diarrhea. No abdominal pain or blood.   HEMATOLOGIC: No bleeding or bruising.     Vital Signs  Temp:  [97.2 °F (36.2 °C)-98.4 °F (36.9 °C)] 98.4 °F (36.9 °C)  Heart Rate:  [] 97  Resp:  [16-18] 18  BP: (137-143)/(61-92) 143/92  SpO2:  [94 %-95 %] 95 %  on  Flow (L/min):  [1-2] 2 O2 Device: nasal cannula    Intake/Output Summary (Last 24 hours) at 03/20/17 1421  Last data filed at 03/20/17 1049   Gross per 24 hour   Intake      0 ml   Output    700 ml   Net   -700 ml     Flowsheet Rows         First Filed Value    Admission Height  64\" (162.6 cm) Documented at 03/09/2017 1426    Admission Weight  250 lb (113 kg) Documented at 03/09/2017 1426          Physical Exam:  GEN:  No acute distress, alert, cooperative, well developed   EYES:   Sclera clear. No icterus. PERRL. Normal EOM  ENT:   External ears/nose normal, no oral lesions, no thrush, mucous membranes moist  NECK:  Supple, midline trachea, no JVD  LUNGS: Normal chest on inspection, diminished , no wheezes. No rhonchi. No crackles. Respirations regular, even and unlabored.   CV:  Regular rhythm and rate. Normal S1/S2. No murmurs, gallops, or rubs noted.  ABD:  Soft, non-tender and non-distended. Normal bowel sounds. No guarding, wound vac to bilateral groin with redness in groin folds, R elbow scab, bilateral skin scabs  EXT:  Moves all extremities well. No cyanosis. No redness. No edema.   Skin: dry, intact, no bleeding    Results Review:        Results from last 7 days  Lab Units 03/20/17  0759 03/19/17  0542 03/18/17  0417 03/17/17  0537 03/16/17  0401 03/15/17  0328 03/14/17  0348   SODIUM mmol/L 138 138 142 142 140 139 142   POTASSIUM mmol/L 4.0 4.2 3.7 3.8 3.7 3.6 4.3   CHLORIDE mmol/L 97* 97* 101 104 100 97* 97*   TOTAL CO2 mmol/L 21.7* 22.9 21.5* 22.0 22.2 23.0 19.5*   BUN mg/dL 40* 21 38* 23 39* 29* 45*   CREATININE mg/dL 6.56* 4.18* 6.20* 4.31* 6.44* 5.06* 5.69*   CALCIUM mg/dL 8.1* 8.2* 7.8* 7.8* 7.2* 7.7* 7.1*   AST (SGOT) U/L 25  --   --   --   " --   --  115*   ALT (SGPT) U/L 22  --   --   --   --   --  108*           Results from last 7 days  Lab Units 03/20/17  0759 03/19/17  0542 03/18/17  0417 03/17/17  0537 03/16/17  0401 03/15/17  0328 03/14/17  0348   WBC 10*3/mm3 15.65* 16.39* 13.44* 11.55* 11.50* 10.30 10.19   HEMOGLOBIN g/dL 10.6* 11.4* 11.1* 10.9* 10.2* 10.6* 10.5*   HEMATOCRIT % 34.0* 37.0 36.2 35.9 32.2* 33.8* 34.0*   PLATELETS 10*3/mm3 257 257 241 249 224 223 208   NEUTROPHIL % %  --   --   --  73.9  --   --  81.3*   LYMPHOCYTE % %  --   --   --  10.6*  --   --  8.5*   MONOCYTES % %  --   --   --  8.2  --   --  6.9   EOSINOPHIL % %  --   --   --  3.7  --   --  1.3   BASOPHIL % %  --   --   --  0.3  --   --  0.2   IMM GRAN % %  --   --   --  3.3*  --   --  1.8*                         GLUCOSE   Date/Time Value Ref Range Status   03/20/2017 1137 135 (H) 70 - 130 mg/dL Final   03/20/2017 0742 108 70 - 130 mg/dL Final   03/20/2017 0521 192 (H) 70 - 130 mg/dL Final   03/19/2017 2018 141 (H) 70 - 130 mg/dL Final   03/19/2017 1707 154 (H) 70 - 130 mg/dL Final   03/19/2017 1206 140 (H) 70 - 130 mg/dL Final   03/19/2017 0724 124 70 - 130 mg/dL Final   03/19/2017 0434 144 (H) 70 - 130 mg/dL Final               Results from last 7 days  Lab Units 03/19/17  1415 03/19/17  1312 03/13/17  1818 03/13/17  1536   BLOODCX  No growth at less than 24 hours No growth at 24 hours No growth at 5 days No growth at 5 days        Results for SALLY TOLLIVER (MRN [id number]) as of 3/18/2017 15:05   Ref. Range 3/9/2017 16:44 3/10/2017 04:41 3/11/2017 03:58 3/11/2017 13:14 3/12/2017 10:22   Creatine Kinase Latest Ref Range: 20 - 180 U/L >20442 (H) >11116 (H) 93677 (H) 01804 (H) 15124 (H)             Echocardiogram: 3/10/17  · Left ventricular wall thickness is consistent with mild concentric hypertrophy.  · Left ventricular function is normal. Estimated EF = 63%.  · Left ventricular diastolic dysfunction (grade I) consistent with impaired relaxation.    Imaging Results  (all)     Procedure Component Value Units Date/Time    CT Head Without Contrast [[id number]] Collected:  03/09/17 1742     Updated:  03/09/17 1750    Impression:       Evidence of minimal small vessel chronic ischemic change as  described. Otherwise unremarkable CT scan of the head.     This report was finalized on 3/9/2017 5:47 PM by Dr. Rodrigue Floyd MD.       CT Chest Without Contrast [[id number]] Collected:  03/09/17 1748    CT Abdomen Pelvis Without Contrast [[id number]] Collected:  03/09/17 1748    XR Forearm 2 View Left [[id number]] Collected:  03/09/17 1745    Impression:       1. Soft tissue swelling of the left forearm without evidence for  underlying bony abnormality.        2. Negative left humerus radiographs.        3. Mild bilateral perihilar atelectasis.     This report was finalized on 3/9/2017 7:45 PM by Dr. Nakul Robins MD.       XR Humerus Left [[id number]] Collected:  03/09/17 1745    Impression:       1. Soft tissue swelling of the left forearm without evidence for  underlying bony abnormality.        2. Negative left humerus radiographs.        3. Mild bilateral perihilar atelectasis.     This report was finalized on 3/9/2017 7:45 PM by Dr. Nakul Robins MD.       XR Chest 1 View [[id number]] Collected:  03/12/17 1353     Updated:  03/12/17 1357    Narrative:       EMERGENCY PORTABLE CHEST SINGLE VIEW     HISTORY: Right jugular line placement     COMPARISON: 03/10/2017     FINDINGS:  1. Interval extubation.  2. Left jugular line tip is remains in good position SVC.  3. New right jugular line terminates in the SVC good position.  4. No other change, no pneumothorax.     This report was finalized on 3/12/2017 1:54 PM by Dr. Micheal Beverly MD.       PICC W FLUORO GUIDANCE [[id number]] Resulted:  03/17/17 0807     Updated:  03/17/17 0807        I reviewed the patient's new clinical results.  I personally viewed and interpreted the patient's CXR        Medication Review:     heparin (porcine) 5,000 Units  Subcutaneous Q8H   insulin aspart 0-9 Units Subcutaneous Q4H   vancomycin 1,000 mg Intravenous Once   Vancomycin Pharmacy Intermittent Dosing  Does not apply Daily         Pharmacy to dose vancomycin     sodium chloride 9 mL/hr Last Rate: 9 mL/hr (03/17/17 0600)       ASSESSMENT:   1. Acute postoperative respiratory failure. Extubated 3/12/17.  Now on room air.  2. Severe rhabdomyolysis  3. Status post radical debridement of bilateral necrotic groin wounds with no evidence of fasciitis currently receiving wound VAC dressing changes. wound culture positive for MRSA.  On vancomycin with plans to transition to oral doxycycline for a total 14 day course to end 3/24/17.  4. Oliguric acute renal failure, 2ary to severe rhabdomyolysis and dehydration.  Receiving hemodialysis  5. Third spacing- improved  6. Lactic acidosis resolved  7. Sepsis, 2ary to #2  8. Metabolic acidosis- Resolved  9. Hypertension  10. History of cognitive dysfunction associated with prior MADISON and traumatic brain injury  11. Hypocalcemia-stable  12. Morbid obesity  13. Anemia-multi-factorial.  Hemoglobin stable  14. Protein calorie malnutrition (Alb=2.4)  15. Worsening leukocytosis with no other signs of infections- improving    PLAN:  Continue vancomycin per IDs recommendation- plan to transition to oral doxycycline at discharge for a total of 14 day course or continue with the vancomycin to be given during her hemodialysis session for the 14 days total course to end 3/24/17  Leukocytosis improving.   Tolerating oral diet well.  On heparin subcutaneous for DVT prophylaxis  Continue wound VAC per wound care  Dialysis today per nephrology. Palindrome catheter placed.  Continue supportive care  Physical therapy  Plan for discharge later today or in am depending on bed placement. Does not need a precert.  Need further instructions from other consultants for discharge follow up and wound care.    Disposition: plan for LTAC at discharge due to extensive  need of care.  Referral has been placed to [facility]- still awaiting evaluation    Fernando Horn MD  03/20/17  2:21 PM    EMR Dragon/Transcription disclaimer:   Much of this encounter note is an electronic transcription/translation of spoken language to printed text. The electronic translation of spoken language may permit erroneous, or at times, nonsensical words or phrases to be inadvertently transcribed; Although I have reviewed the note for such errors, some may still exist.

## 2017-03-20 NOTE — PLAN OF CARE
Problem: Patient Care Overview (Adult)  Goal: Plan of Care Review  Outcome: Ongoing (interventions implemented as appropriate)    03/20/17 5454   Coping/Psychosocial Response Interventions   Plan Of Care Reviewed With patient   Patient Care Overview   Progress improving   Outcome Evaluation   Outcome Summary/Follow up Plan Pt A&O sleeping on/off throughout day, WOC RN changed wound vac drsg, WOC RN reminding RN to assess wound vac Q2H to ensure no leak, nutrition consulted re slow wound progression, HD today, plan to DC to [facility] tmrw        Goal: Adult Individualization and Mutuality  Outcome: Ongoing (interventions implemented as appropriate)  Goal: Discharge Needs Assessment  Outcome: Ongoing (interventions implemented as appropriate)    Problem: Sepsis (Adult)  Goal: Signs and Symptoms of Listed Potential Problems Will be Absent or Manageable (Sepsis)  Outcome: Ongoing (interventions implemented as appropriate)    Problem: Wound, Traumatic, Nonburn (Adult)  Goal: Signs and Symptoms of Listed Potential Problems Will be Absent or Manageable (Wound, Traumatic, Nonburn)  Outcome: Ongoing (interventions implemented as appropriate)    Problem: Fall Risk (Adult)  Goal: Absence of Falls  Outcome: Ongoing (interventions implemented as appropriate)    Problem: Urine Elimination, Impaired (Adult)  Goal: Effective Urinary Elimination  Outcome: Ongoing (interventions implemented as appropriate)

## 2017-03-20 NOTE — PLAN OF CARE
Problem: Inpatient Physical Therapy  Goal: Bed Mobility Goal LTG- PT  Outcome: Ongoing (interventions implemented as appropriate)    03/20/17 1006   Bed Mobility PT LTG   Bed Mobility PT LTG, Date Established 03/20/17   Bed Mobility PT LTG, Time to Achieve 1 wk   Bed Mobility PT LTG, Activity Type all bed mobility   Bed Mobility PT LTG, Mitchell Level contact guard assist   Bed Mobility PT LTG, Outcome goal revised       Goal: Transfer Training Goal 1 LTG- PT  Outcome: Ongoing (interventions implemented as appropriate)    03/20/17 1006   Transfer Training PT LTG   Transfer Training PT LTG, Date Established 03/20/17   Transfer Training PT LTG, Time to Achieve 1 wk   Transfer Training PT LTG, Activity Type bed to chair /chair to bed;sit to stand/stand to sit   Transfer Training PT LTG, Mitchell Level contact guard assist   Transfer Training PT LTG, Assist Device (with appropriate AD)   Transfer Training PT LTG, Outcome goal revised       Goal: Gait Training Goal LTG- PT  Outcome: Ongoing (interventions implemented as appropriate)    03/20/17 1006   Gait Training PT LTG   Gait Training Goal PT LTG, Date Established 03/20/17   Gait Training Goal PT LTG, Time to Achieve 1 wk   Gait Training Goal PT LTG, Mitchell Level contact guard assist   Gait Training Goal PT LTG, Assist Device (with appropriate AD)   Gait Training Goal PT LTG, Distance to Achieve 100       Goal: Dynamic Sitting Balance Goal LTG- PT  Outcome: Ongoing (interventions implemented as appropriate)    03/20/17 1006   Dynamic Sitting Balance PT LTG   Dynamic Sitting Balance PT LTG, Date Established 03/20/17   Dynamic Sitting Balance PT LTG, Time to Achieve 1 wk   Dynamic Sitting Balance PT LTG, Mitchell Level independent   Dynamic Sitting Balance PT LTG, Assist Device UE Support
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
## 2017-03-20 NOTE — PROGRESS NOTES
Acute Care - Physical Therapy Treatment Note  [facility]     Patient Name: Luca Amezcua  : 1955  MRN: [id number]  Today's Date: 3/20/2017  Onset of Illness/Injury or Date of Surgery Date: 17  Date of Referral to PT: 17  Referring Physician: Sherri Salmeron    Admit Date: 3/9/2017    Visit Dx:    ICD-10-CM ICD-9-CM   1. Acute renal failure, unspecified acute renal failure type N17.9 584.9   2. Fall, initial encounter W19.XXXA E888.9   3. Sepsis, due to unspecified organism A41.9 038.9     995.91   4. Metabolic acidosis E87.2 276.2   5. Hypothermia, initial encounter T68.XXXA 991.6   6. Necrotizing fasciitis M72.6 728.86   7. Traumatic rhabdomyolysis, initial encounter T79.6XXA 958.6     Patient Active Problem List   Diagnosis   • Sepsis   • Renal failure, acute               Adult Rehabilitation Note       17 1300 17 1542       Rehab Assessment/Intervention    Discipline physical therapy assistant  -CW physical therapist  -AR     Document Type therapy note (daily note)  -CW therapy note (daily note)  -AR     Subjective Information agree to therapy  -CW agree to therapy  -AR     Patient Effort, Rehab Treatment good  -CW good  -AR     Symptoms Noted During/After Treatment  fatigue  -AR     Precautions/Limitations fall precautions  -CW fall precautions  -AR     Recorded by [CW] Tl Oro [AR] Carolyn Joe, PT     Pain Assessment    Pain Assessment No/denies pain  -CW No/denies pain  -AR     Recorded by [CW] Tl Oro [AR] Carolyn Joe PT     Cognitive Assessment/Intervention    Current Cognitive/Communication Assessment impaired  -CW did not assess  -AR     Orientation Status oriented x 4  -CW oriented x 4  -AR     Follows Commands/Answers Questions 100% of the time  -% of the time  -AR     Personal Safety WNL/WFL  -CW WNL/WFL  -AR     Personal Safety Interventions fall prevention program maintained;gait belt;nonskid shoes/slippers when out of bed   -CW fall prevention program maintained;gait belt  -AR     Recorded by [CW] Tl Oro [AR] Carolyn Joe PT     Bed Mobility, Assessment/Treatment    Bed Mobility, Assistive Device  bed rails;head of bed elevated  -AR     Bed Mob, Supine to Sit, [place] minimum assist (75% patient effort)  -CW minimum assist (75% patient effort)  -AR     Bed Mob, Sit to Supine, [place] minimum assist (75% patient effort)  -CW moderate assist (50% patient effort)  -AR     Recorded by [CW] Tl Oro [AR] Carolyn Joe PT     Transfer Assessment/Treatment    Transfers, Sit-Stand [place] minimum assist (75% patient effort)  -CW minimum assist (75% patient effort)  -AR     Transfers, Stand-Sit [place] minimum assist (75% patient effort)  -CW minimum assist (75% patient effort)  -AR     Transfers, Sit-Stand-Sit, Assist Device rolling walker  -CW rolling walker  -AR     Recorded by [CW] Tl Oro [AR] Carolyn Joe PT     Gait Assessment/Treatment    Gait, [place] Level minimum assist (75% patient effort)  -CW minimum assist (75% patient effort)  -AR     Gait, Assistive Device rolling walker  -CW rolling walker  -AR     Gait, Distance (Feet) 12  -CW 12  -AR     Gait, Gait Pattern Analysis  swing-through gait  -AR     Gait, Gait Deviations rafal decreased;step length decreased;stride length decreased  -CW rafal decreased;decreased heel strike;forward flexed posture  -AR     Gait, Safety Issues  step length decreased;weight-shifting ability decreased  -AR     Gait, Impairments  impaired balance;strength decreased  -AR     Recorded by [CW] Tl Oro [AR] Carolyn Joe PT     Therapy Exercises    Bilateral Lower Extremities AROM:;LAQ;10 reps;sitting;ankle pumps/circles  -CW      Recorded by [CW] Tl Oro      Positioning and Restraints    Pre-Treatment Position in bed  -CW in bed  -AR     Post Treatment Position bed  -CW bed  -AR     In Bed notified  nsg;supine;call light within reach;encouraged to call for assist;exit alarm on  -CW supine;call light within reach;encouraged to call for assist;exit alarm on  -AR     Recorded by [CW] Tl Oro [AR] Carolyn Joe PT       User Key  (r) = Recorded By, (t) = Taken By, (c) = Cosigned By    Initials Name Effective Dates    AR Carolyn Joe, PT 06/27/16 -     CW Tl Oro 12/13/16 -                 IP PT Goals       03/20/17 1006 03/13/17 1158       Bed Mobility PT LTG    Bed Mobility PT LTG, Date Established 03/20/17  -EW 03/13/17  -MS     Bed Mobility PT LTG, Time to Achieve 1 wk  -EW 5 - 7 days  -MS     Bed Mobility PT LTG, Activity Type all bed mobility  -EW all bed mobility  -MS     Bed Mobility PT LTG, [place] Level contact guard assist  -EW minimum assist (75% patient effort)  -MS     Bed Mobility PT LTG, Outcome goal revised  -EW      Transfer Training PT LTG    Transfer Training PT LTG, Date Established 03/20/17  -EW 03/13/17  -MS     Transfer Training PT LTG, Time to Achieve 1 wk  -EW 5 - 7 days  -MS     Transfer Training PT LTG, Activity Type bed to chair /chair to bed;sit to stand/stand to sit  -EW bed to chair /chair to bed;sit to stand/stand to sit  -MS     Transfer Training PT LTG, [place] Level contact guard assist  -EW minimum assist (75% patient effort)  -MS     Transfer Training PT LTG, Assist Device --   with appropriate AD  -EW --   With A.A.D.  -MS     Transfer Training PT LTG, Outcome goal revised  -EW      Gait Training PT LTG    Gait Training Goal PT LTG, Date Established 03/20/17  -EW 03/13/17  -MS     Gait Training Goal PT LTG, Time to Achieve 1 wk  -EW 5 - 7 days  -MS     Gait Training Goal PT LTG, [place] Level contact guard assist  -EW minimum assist (75% patient effort)  -MS     Gait Training Goal PT LTG, Assist Device --   with appropriate AD  -EW --   With A.A.D.  -MS     Gait Training Goal PT LTG, Distance to Achieve 100  - feet  -MS      Dynamic Sitting Balance PT LTG    Dynamic Sitting Balance PT LTG, Date Established 03/20/17  -EW 03/13/17  -MS     Dynamic Sitting Balance PT LTG, Time to Achieve 1 wk  -EW 5 - 7 days  -MS     Dynamic Sitting Balance PT LTG, [place] Level independent  -EW independent  -MS     Dynamic Sitting Balance PT LTG, Assist Device UE Support  -EW UE Support  -MS       User Key  (r) = Recorded By, (t) = Taken By, (c) = Cosigned By    Initials Name Provider Type    MS Abdiel LOCKWOOD Joselito, PT Physical Therapist    EW Katy Bender, PT Physical Therapist          Physical Therapy Education     Title: PT OT SLP Therapies (Done)     Topic: Physical Therapy (Done)     Point: Mobility training (Done)    Learning Progress Summary    Learner Readiness Method Response Comment Documented by Status   Patient Acceptance E,TB,D DU,VU  CW 03/20/17 1336 Done    Acceptance E NR  AR 03/19/17 1544 Active    Acceptance E,TB DU,VU  CW 03/17/17 1338 Done    Acceptance E,TB,D VU,NR  CH 03/14/17 1655 Done    Acceptance E,D NR Pt. is Hard of hearing; Must speak louder and clearly for pt. to hear you. MS 03/13/17 1157 Active               Point: Home exercise program (Done)    Learning Progress Summary    Learner Readiness Method Response Comment Documented by Status   Patient Acceptance E,TB,D DU,VU  CW 03/20/17 1336 Done    Acceptance E NR  AR 03/19/17 1544 Active    Acceptance E,TB DU,VU  CW 03/17/17 1338 Done    Acceptance E,D NR Pt. is Hard of hearing; Must speak louder and clearly for pt. to hear you. MS 03/13/17 1157 Active               Point: Body mechanics (Done)    Learning Progress Summary    Learner Readiness Method Response Comment Documented by Status   Patient Acceptance E,TB,D DU,VU  CW 03/20/17 1336 Done    Acceptance E NR  AR 03/19/17 1544 Active    Acceptance E,TB DU,VU  CW 03/17/17 1338 Done    Acceptance E,TB,D VU,NR  CH 03/14/17 1655 Done    Acceptance E,D NR Pt. is Hard of hearing; Must speak louder and clearly for pt. to  hear you. MS 03/13/17 1157 Active               Point: Precautions (Done)    Learning Progress Summary    Learner Readiness Method Response Comment Documented by Status   Patient Acceptance E,TB,D DU,VU   03/20/17 1336 Done    Acceptance E NR  AR 03/19/17 1544 Active    Acceptance E,TB DU,VU   03/17/17 1338 Done    Acceptance E,TB,D VU,NR   03/14/17 1655 Done    Acceptance E,D NR Pt. is Hard of hearing; Must speak louder and clearly for pt. to hear you. MS 03/13/17 1157 Active                      User Key     Initials Effective Dates Name Provider Type Discipline     12/01/15 -  Katie Hagan, PT Physical Therapist PT    MS 12/01/15 -  Abdiel Yee, PT Physical Therapist PT    AR 06/27/16 -  Carolyn Joe, PT Physical Therapist PT     12/13/16 -  Tl Oro Physical Therapy Assistant PT                    PT Recommendation and Plan  Anticipated Discharge Disposition: skilled nursing facility  Planned Therapy Interventions: balance training, bed mobility training, gait training, patient/family education, postural re-education, ROM (Range of Motion), strengthening, transfer training  PT Frequency: daily  Plan of Care Review  Plan Of Care Reviewed With: patient  Progress: progress toward functional goals as expected  Outcome Summary/Follow up Plan: Pt increased with cognitive awareness and is able to follow commands and sit EOB with no A          Outcome Measures       03/20/17 1300 03/19/17 1500       How much help from another person do you currently need...    Turning from your back to your side while in flat bed without using bedrails? 3  -CW 3  -AR     Moving from lying on back to sitting on the side of a flat bed without bedrails? 3  -CW 3  -AR     Moving to and from a bed to a chair (including a wheelchair)? 3  -CW 3  -AR     Standing up from a chair using your arms (e.g., wheelchair, bedside chair)? 3  -CW 3  -AR     Climbing 3-5 steps with a railing? 1  -CW 1  -AR     To walk in  hospital room? 3  -CW 3  -AR     AM-PAC 6 Clicks Score 16  -CW 16  -AR     Functional Assessment    Outcome Measure Options AM-PAC 6 Clicks Basic Mobility (PT)  -CW        User Key  (r) = Recorded By, (t) = Taken By, (c) = Cosigned By    Initials Name Provider Type    AR Carolyn Joe, PT Physical Therapist    CW Tl Oro Physical Therapy Assistant           Time Calculation:         PT Charges       03/20/17 1338 03/20/17 1018 03/20/17 1009    Time Calculation    Start Time 1320  -CW      Stop Time 1338  -CW      Time Calculation (min) 18 min  -CW      PT Received On 03/20/17  -CW      PT - Next Appointment 03/21/17  -CW 03/20/17  -CW     PT Goal Re-Cert Due Date   03/27/17  -EW      User Key  (r) = Recorded By, (t) = Taken By, (c) = Cosigned By    Initials Name Provider Type    EW Katy Bender, PT Physical Therapist    CW Tl Oro Physical Therapy Assistant          Therapy Charges for Today     Code Description Service Date Service Provider Modifiers Qty    75587748803 HC PT THER PROC EA 15 MIN 3/20/2017 Tl Oro GP 1          PT G-Codes  Outcome Measure Options: AM-PAC 6 Clicks Basic Mobility (PT)    Tl Oro  3/20/2017

## 2017-03-20 NOTE — PLAN OF CARE
Problem: Patient Care Overview (Adult)  Goal: Plan of Care Review  Outcome: Ongoing (interventions implemented as appropriate)    03/20/17 0418   Coping/Psychosocial Response Interventions   Plan Of Care Reviewed With patient   Patient Care Overview   Progress progress toward functional goals as expected   Outcome Evaluation   Outcome Summary/Follow up Plan Feeling better overall tonight. Did not sleep much. Wounds continue to heal. Wound vac in place ruben abd. Some excoriation noted under abd folds. Cream applied. VSS. Continue to monitor.        Goal: Discharge Needs Assessment  Outcome: Ongoing (interventions implemented as appropriate)    Problem: Sepsis (Adult)  Goal: Signs and Symptoms of Listed Potential Problems Will be Absent or Manageable (Sepsis)  Outcome: Ongoing (interventions implemented as appropriate)    Problem: Wound, Traumatic, Nonburn (Adult)  Goal: Signs and Symptoms of Listed Potential Problems Will be Absent or Manageable (Wound, Traumatic, Nonburn)  Outcome: Ongoing (interventions implemented as appropriate)    Problem: Fall Risk (Adult)  Goal: Absence of Falls  Outcome: Ongoing (interventions implemented as appropriate)    Problem: Urine Elimination, Impaired (Adult)  Goal: Effective Urinary Elimination  Outcome: Ongoing (interventions implemented as appropriate)  Goal: Reduced Incontinence Episodes  Outcome: Ongoing (interventions implemented as appropriate)

## 2017-03-20 NOTE — NURSING NOTE
Changed VAC drsg to bilateral groin wounds using black granufoam; set at 125 mm hg, continuous suction.  Pt had oral pain medication about an hour prior to drsg chg; tolerated drsg chg well.    Staff RN states VAC drsg did not have air tight seal earlier today and she applied more drape.  Wounds pink but with minimal granulation tissue; also small area of yellow slough noted in right groin wound today.  Will ask nutrition to see for supplement to aid in wound healing.  Used becca seals in skin folds in effort to obtain effective seal.  Spoke with RN and asked to pass along to nsg staff to look at VAC drsgs every 2hrs when making rounds to be sure effective seal is maintained.

## 2017-03-21 VITALS
HEART RATE: 101 BPM | SYSTOLIC BLOOD PRESSURE: 157 MMHG | BODY MASS INDEX: 38.45 KG/M2 | TEMPERATURE: 97.7 F | DIASTOLIC BLOOD PRESSURE: 97 MMHG | RESPIRATION RATE: 16 BRPM | OXYGEN SATURATION: 96 % | WEIGHT: 225.2 LBS | HEIGHT: 64 IN

## 2017-03-21 PROBLEM — T79.6XXA TRAUMATIC RHABDOMYOLYSIS (HCC): Status: ACTIVE | Noted: 2017-03-21
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
## 2017-03-21 PROBLEM — E87.20 METABOLIC ACIDOSIS: Status: ACTIVE | Noted: 2017-03-21

## 2017-03-21 PROBLEM — T68.XXXA HYPOTHERMIA: Status: ACTIVE | Noted: 2017-03-21

## 2017-03-21 PROBLEM — M72.6 NECROTIZING FASCIITIS (HCC): Status: ACTIVE | Noted: 2017-03-21

## 2017-03-21 LAB
ANION GAP SERPL CALCULATED.3IONS-SCNC: 14.2 MMOL/L
BUN BLD-MCNC: 24 MG/DL (ref 8–23)
BUN/CREAT SERPL: 5.5 (ref 7–25)
CALCIUM SPEC-SCNC: 8 MG/DL (ref 8.6–10.5)
CHLORIDE SERPL-SCNC: 95 MMOL/L (ref 98–107)
CO2 SERPL-SCNC: 25.8 MMOL/L (ref 22–29)
CREAT BLD-MCNC: 4.39 MG/DL (ref 0.57–1)
DEPRECATED RDW RBC AUTO: 50.3 FL (ref 37–54)
ERYTHROCYTE [DISTWIDTH] IN BLOOD BY AUTOMATED COUNT: 16.7 % (ref 11.7–13)
GFR SERPL CREATININE-BSD FRML MDRD: 10 ML/MIN/1.73
GLUCOSE BLD-MCNC: 118 MG/DL (ref 65–99)
GLUCOSE BLDC GLUCOMTR-MCNC: 101 MG/DL (ref 70–130)
GLUCOSE BLDC GLUCOMTR-MCNC: 121 MG/DL (ref 70–130)
GLUCOSE BLDC GLUCOMTR-MCNC: 121 MG/DL (ref 70–130)
GLUCOSE BLDC GLUCOMTR-MCNC: 156 MG/DL (ref 70–130)
GLUCOSE BLDC GLUCOMTR-MCNC: 96 MG/DL (ref 70–130)
HCT VFR BLD AUTO: 34 % (ref 35.6–45.5)
HGB BLD-MCNC: 10.6 G/DL (ref 11.9–15.5)
MCH RBC QN AUTO: 26.8 PG (ref 26.9–32)
MCHC RBC AUTO-ENTMCNC: 31.2 G/DL (ref 32.4–36.3)
MCV RBC AUTO: 85.9 FL (ref 80.5–98.2)
PLATELET # BLD AUTO: 251 10*3/MM3 (ref 140–500)
PMV BLD AUTO: 10 FL (ref 6–12)
POTASSIUM BLD-SCNC: 4.1 MMOL/L (ref 3.5–5.2)
RBC # BLD AUTO: 3.96 10*6/MM3 (ref 3.9–5.2)
SODIUM BLD-SCNC: 135 MMOL/L (ref 136–145)
WBC NRBC COR # BLD: 17.59 10*3/MM3 (ref 4.5–10.7)

## 2017-03-21 PROCEDURE — 25010000002 HEPARIN (PORCINE) PER 1000 UNITS: Performed by: INTERNAL MEDICINE

## 2017-03-21 PROCEDURE — 97110 THERAPEUTIC EXERCISES: CPT

## 2017-03-21 PROCEDURE — 82962 GLUCOSE BLOOD TEST: CPT

## 2017-03-21 PROCEDURE — 85027 COMPLETE CBC AUTOMATED: CPT | Performed by: INTERNAL MEDICINE

## 2017-03-21 PROCEDURE — 63710000001 INSULIN ASPART PER 5 UNITS: Performed by: INTERNAL MEDICINE

## 2017-03-21 PROCEDURE — 80048 BASIC METABOLIC PNL TOTAL CA: CPT | Performed by: INTERNAL MEDICINE

## 2017-03-21 RX ORDER — HYDROCODONE BITARTRATE AND ACETAMINOPHEN 5; 325 MG/1; MG/1
1 TABLET ORAL EVERY 4 HOURS PRN
Qty: 10 TABLET | Refills: 0 | Status: SHIPPED | OUTPATIENT
Start: 2017-03-21 | End: 2017-03-27

## 2017-03-21 RX ADMIN — HYDROCODONE BITARTRATE AND ACETAMINOPHEN 1 TABLET: 5; 325 TABLET ORAL at 17:37

## 2017-03-21 RX ADMIN — INSULIN ASPART 2 UNITS: 100 INJECTION, SOLUTION INTRAVENOUS; SUBCUTANEOUS at 04:54

## 2017-03-21 RX ADMIN — HEPARIN SODIUM 5000 UNITS: 5000 INJECTION, SOLUTION INTRAVENOUS; SUBCUTANEOUS at 13:53

## 2017-03-21 RX ADMIN — HEPARIN SODIUM 5000 UNITS: 5000 INJECTION, SOLUTION INTRAVENOUS; SUBCUTANEOUS at 00:38

## 2017-03-21 RX ADMIN — HEPARIN SODIUM 5000 UNITS: 5000 INJECTION, SOLUTION INTRAVENOUS; SUBCUTANEOUS at 07:24

## 2017-03-21 NOTE — PROGRESS NOTES
Continued Stay Note  [facility]     Patient Name: Luca Amezcua  MRN: [id number]  Today's Date: 3/21/2017    Admit Date: 3/9/2017          Discharge Plan       03/21/17 1616    Case Management/Social Work Plan    Plan [facility]  today- EMS transportation via [facility]    Additional Comments Pt with discharge orders noted- called and s/w [facility] [place] [facility] pt's room will be ready after 7:30 pm. Nurse to call report to [phone] and fax discharge summary to [phone]. [facility] states pt can arrive no later than 10 pm. Transportation arranged with [facility] with scheduled  at 8:30 pm. Attempted to leave [facility] with brother Bahman Amezcua and was not given that option. Will ask nurse to try again later. [facility] will be on call tonight and [facility] [place] staff may call her with any questions/concerns that arise..................VERONIQUE Grande, CCP              Discharge Codes     None        Expected Discharge Date and Time     Expected Discharge Date Expected Discharge Time    Mar 21, 2017             Patrice Esteban RN

## 2017-03-21 NOTE — SIGNIFICANT NOTE
03/21/17 0924   Rehab Treatment   Discipline physical therapy assistant   Treatment Not Performed patient unavailable for treatment  (Pt getting a bath PT to check back in PM)   Recommendation   PT - Next Appointment 03/21/17

## 2017-03-21 NOTE — CONSULTS
Malnutrition Severity Assessment    Patient Name:  Luca Amezcua  YOB: 1955  MRN: [id number]  Admit Date:  3/9/2017    Patient meets criteria for : Mild malnutrition    Also not poor wound healing not progressing as anticipated.    Malnutrition Type: Acute Illness/Injury Malnutrition     Malnutrition Type (last 8 hours)      Malnutrition Severity Assessment       03/21/17 0941    Malnutrition Severity Assessment    Malnutrition Type Acute Illness/Injury Malnutrition          Weight Status         Most Recent Value    Weight Loss  Mild (>2% / 1 mo)      Energy Intake Status         Most Recent Value    Energy Intake  Mild (<75% / 5d)              Electronically signed by:  Nita Stafford RD  03/21/17 9:49 AM

## 2017-03-21 NOTE — PROGRESS NOTES
Acute Care - Physical Therapy Treatment Note  [facility]     Patient Name: Luca Amezcua  : 1955  MRN: [id number]  Today's Date: 3/21/2017  Onset of Illness/Injury or Date of Surgery Date: 17  Date of Referral to PT: 17  Referring Physician: Billie Salmeron    Admit Date: 3/9/2017    Visit Dx:    ICD-10-CM ICD-9-CM   1. Acute renal failure, unspecified acute renal failure type N17.9 584.9   2. Fall, initial encounter W19.XXXA E888.9   3. Sepsis, due to unspecified organism A41.9 038.9     995.91   4. Metabolic acidosis E87.2 276.2   5. Hypothermia, initial encounter T68.XXXA 991.6   6. Necrotizing fasciitis M72.6 728.86   7. Traumatic rhabdomyolysis, initial encounter T79.6XXA 958.6     Patient Active Problem List   Diagnosis   • Sepsis   • Acute renal failure   • Necrotizing fasciitis   • Metabolic acidosis   • Fall   • Hypothermia   • Traumatic rhabdomyolysis               Adult Rehabilitation Note       17 1400 17 1300 17 1542    Rehab Assessment/Intervention    Discipline physical therapy assistant  -CW physical therapy assistant  -CW physical therapist  -AR    Document Type therapy note (daily note)  -CW therapy note (daily note)  -CW therapy note (daily note)  -AR    Subjective Information agree to therapy  -CW agree to therapy  -CW agree to therapy  -AR    Patient Effort, Rehab Treatment adequate  -CW good  -CW good  -AR    Symptoms Noted During/After Treatment   fatigue  -AR    Precautions/Limitations fall precautions  -CW fall precautions  -CW fall precautions  -AR    Recorded by [CW] Tl Oro [CW] Tl Oro [AR] Carolyn Joe, PT    Pain Assessment    Pain Assessment No/denies pain  -CW No/denies pain  -CW No/denies pain  -AR    Recorded by [CW] Tl Oro [CW] Tl Oro [AR] Carolyn Joe PT    Cognitive Assessment/Intervention    Current Cognitive/Communication Assessment impaired  -CW impaired  -CW did not assess  -AR     Orientation Status oriented x 4  -CW oriented x 4  -CW oriented x 4  -AR    Follows Commands/Answers Questions 100% of the time  -% of the time  -% of the time  -AR    Personal Safety WNL/WFL  -CW WNL/WFL  -CW WNL/WFL  -AR    Personal Safety Interventions fall prevention program maintained;gait belt;nonskid shoes/slippers when out of bed  -CW fall prevention program maintained;gait belt;nonskid shoes/slippers when out of bed  -CW fall prevention program maintained;gait belt  -AR    Recorded by [CW] Tl Oro [CW] Tl Oro [AR] Carolyn Joe PT    Bed Mobility, Assessment/Treatment    Bed Mobility, Assistive Device   bed rails;head of bed elevated  -AR    Bed Mob, Supine to Sit, [place] minimum assist (75% patient effort)  -CW minimum assist (75% patient effort)  -CW minimum assist (75% patient effort)  -AR    Bed Mob, Sit to Supine, [place] minimum assist (75% patient effort)  -CW minimum assist (75% patient effort)  -CW moderate assist (50% patient effort)  -AR    Recorded by [CW] Tl Oro [CW] Tl Oro [AR] Carolyn Joe PT    Transfer Assessment/Treatment    Transfers, Sit-Stand [place] minimum assist (75% patient effort)  -CW minimum assist (75% patient effort)  -CW minimum assist (75% patient effort)  -AR    Transfers, Stand-Sit [place] minimum assist (75% patient effort)  -CW minimum assist (75% patient effort)  -CW minimum assist (75% patient effort)  -AR    Transfers, Sit-Stand-Sit, Assist Device rolling walker  -CW rolling walker  -CW rolling walker  -AR    Recorded by [CW] Tl Oro [CW] Tl Oro [AR] Carolyn Joe PT    Gait Assessment/Treatment    Gait, [place] Level not tested  -CW minimum assist (75% patient effort)  -CW minimum assist (75% patient effort)  -AR    Gait, Assistive Device  rolling walker  -CW rolling walker  -AR    Gait, Distance (Feet)  12  -CW 12  -AR    Gait, Gait Pattern  Analysis   swing-through gait  -AR    Gait, Gait Deviations  rafal decreased;step length decreased;stride length decreased  -CW rafal decreased;decreased heel strike;forward flexed posture  -AR    Gait, Safety Issues   step length decreased;weight-shifting ability decreased  -AR    Gait, Impairments   impaired balance;strength decreased  -AR    Gait, Comment pt wanted to hold due to feeling like she needed to have BM  -CW      Recorded by [CW] Tl Oro [CW] Tl Oro [AR] Carolyn Joe PT    Therapy Exercises    Bilateral Lower Extremities AROM:;LAQ;10 reps;sitting;ankle pumps/circles;supine;glut sets;hip flexion;quad sets;SAQ  -CW AROM:;LAQ;10 reps;sitting;ankle pumps/circles  -CW     Recorded by [CW] Tl Oro [CW] Tl Oro     Positioning and Restraints    Pre-Treatment Position in bed  -CW in bed  -CW in bed  -AR    Post Treatment Position bed  -CW bed  -CW bed  -AR    In Bed notified nsg;supine;call light within reach;encouraged to call for assist;with family/caregiver  -CW notified nsg;supine;call light within reach;encouraged to call for assist;exit alarm on  -CW supine;call light within reach;encouraged to call for assist;exit alarm on  -AR    Recorded by [CW] Tl Oro [CW] Tl Oro [AR] Carolyn Joe PT      User Key  (r) = Recorded By, (t) = Taken By, (c) = Cosigned By    Initials Name Effective Dates    AR Carolyn Joe, PT 06/27/16 -     CW Tl Oro 12/13/16 -                 IP PT Goals       03/20/17 1006 03/13/17 1158       Bed Mobility PT LTG    Bed Mobility PT LTG, Date Established 03/20/17  -EW 03/13/17  -MS     Bed Mobility PT LTG, Time to Achieve 1 wk  -EW 5 - 7 days  -MS     Bed Mobility PT LTG, Activity Type all bed mobility  -EW all bed mobility  -MS     Bed Mobility PT LTG, [place] Level contact guard assist  -EW minimum assist (75% patient effort)  -MS     Bed Mobility PT LTG, Outcome goal revised  -EW       Transfer Training PT LTG    Transfer Training PT LTG, Date Established 03/20/17  -EW 03/13/17  -MS     Transfer Training PT LTG, Time to Achieve 1 wk  -EW 5 - 7 days  -MS     Transfer Training PT LTG, Activity Type bed to chair /chair to bed;sit to stand/stand to sit  -EW bed to chair /chair to bed;sit to stand/stand to sit  -MS     Transfer Training PT LTG, [place] Level contact guard assist  -EW minimum assist (75% patient effort)  -MS     Transfer Training PT LTG, Assist Device --   with appropriate AD  -EW --   With A.A.D.  -MS     Transfer Training PT LTG, Outcome goal revised  -EW      Gait Training PT LTG    Gait Training Goal PT LTG, Date Established 03/20/17  -EW 03/13/17  -MS     Gait Training Goal PT LTG, Time to Achieve 1 wk  -EW 5 - 7 days  -MS     Gait Training Goal PT LTG, [place] Level contact guard assist  -EW minimum assist (75% patient effort)  -MS     Gait Training Goal PT LTG, Assist Device --   with appropriate AD  -EW --   With A.A.D.  -MS     Gait Training Goal PT LTG, Distance to Achieve 100  - feet  -MS     Dynamic Sitting Balance PT LTG    Dynamic Sitting Balance PT LTG, Date Established 03/20/17  -EW 03/13/17  -MS     Dynamic Sitting Balance PT LTG, Time to Achieve 1 wk  -EW 5 - 7 days  -MS     Dynamic Sitting Balance PT LTG, [place] Level independent  -EW independent  -MS     Dynamic Sitting Balance PT LTG, Assist Device UE Support  -EW UE Support  -MS       User Key  (r) = Recorded By, (t) = Taken By, (c) = Cosigned By    Initials Name Provider Type    MS Abdiel LOCKWOOD Joselito, PT Physical Therapist    EW Katy Bender, PT Physical Therapist          Physical Therapy Education     Title: PT OT SLP Therapies (Done)     Topic: Physical Therapy (Done)     Point: Mobility training (Done)    Learning Progress Summary    Learner Readiness Method Response Comment Documented by Status   Patient Acceptance ANA MELARA VU CW 03/21/17 1412 Done    Acceptance ANA MELARA D DU, VU CW  03/20/17 1336 Done    Acceptance E NR  AR 03/19/17 1544 Active    Acceptance E,TB DU,VU  CW 03/17/17 1338 Done    Acceptance E,TB,D VU,NR  CH 03/14/17 1655 Done    Acceptance E,D NR Pt. is Hard of hearing; Must speak louder and clearly for pt. to hear you. MS 03/13/17 1157 Active               Point: Home exercise program (Done)    Learning Progress Summary    Learner Readiness Method Response Comment Documented by Status   Patient Acceptance E,TB DU,VU  CW 03/21/17 1412 Done    Acceptance E,TB,D DU,VU  CW 03/20/17 1336 Done    Acceptance E NR  AR 03/19/17 1544 Active    Acceptance E,TB DU,VU  CW 03/17/17 1338 Done    Acceptance E,D NR Pt. is Hard of hearing; Must speak louder and clearly for pt. to hear you. MS 03/13/17 1157 Active               Point: Body mechanics (Done)    Learning Progress Summary    Learner Readiness Method Response Comment Documented by Status   Patient Acceptance E,TB DU,VU  CW 03/21/17 1412 Done    Acceptance E,TB,D DU,VU  CW 03/20/17 1336 Done    Acceptance E NR  AR 03/19/17 1544 Active    Acceptance E,TB DU,VU  CW 03/17/17 1338 Done    Acceptance E,TB,D VU,NR   03/14/17 1655 Done    Acceptance E,D NR Pt. is Hard of hearing; Must speak louder and clearly for pt. to hear you. MS 03/13/17 1157 Active               Point: Precautions (Done)    Learning Progress Summary    Learner Readiness Method Response Comment Documented by Status   Patient Acceptance E,TB DU,VU  CW 03/21/17 1412 Done    Acceptance E,TB,D DU,VU  CW 03/20/17 1336 Done    Acceptance E NR  AR 03/19/17 1544 Active    Acceptance E,TB DU,VU  CW 03/17/17 1338 Done    Acceptance E,TB,D VU,NR  CH 03/14/17 1655 Done    Acceptance E,D NR Pt. is Hard of hearing; Must speak louder and clearly for pt. to hear you. MS 03/13/17 1157 Active                      User Key     Initials Effective Dates Name Provider Type Discipline     12/01/15 -  Katie Hagan, PT Physical Therapist PT    MS 12/01/15 -  Abdiel Yee, PT Physical  Therapist PT    AR 06/27/16 -  Carolyn Joe PT Physical Therapist PT    CW 12/13/16 -  Tl Oro Physical Therapy Assistant PT                    PT Recommendation and Plan  Anticipated Discharge Disposition: skilled nursing facility  Planned Therapy Interventions: balance training, bed mobility training, gait training, patient/family education, postural re-education, ROM (Range of Motion), strengthening, transfer training  PT Frequency: daily  Plan of Care Review  Plan Of Care Reviewed With: patient  Progress: progress towards functional goals is fair  Outcome Summary/Follow up Plan: Pt increasing with bed mobility and strength          Outcome Measures       03/21/17 1400 03/20/17 1300 03/19/17 1500    How much help from another person do you currently need...    Turning from your back to your side while in flat bed without using bedrails? 3  -CW 3  -CW 3  -AR    Moving from lying on back to sitting on the side of a flat bed without bedrails? 3  -CW 3  -CW 3  -AR    Moving to and from a bed to a chair (including a wheelchair)? 3  -CW 3  -CW 3  -AR    Standing up from a chair using your arms (e.g., wheelchair, bedside chair)? 3  -CW 3  -CW 3  -AR    Climbing 3-5 steps with a railing? 1  -CW 1  -CW 1  -AR    To walk in hospital room? 3  -CW 3  -CW 3  -AR    AM-PAC 6 Clicks Score 16  -CW 16  -CW 16  -AR    Functional Assessment    Outcome Measure Options AM-PAC 6 Clicks Basic Mobility (PT)  -CW AM-PAC 6 Clicks Basic Mobility (PT)  -CW       User Key  (r) = Recorded By, (t) = Taken By, (c) = Cosigned By    Initials Name Provider Type    AR Carolyn Joe, PT Physical Therapist    CW Tl Oro Physical Therapy Assistant           Time Calculation:         PT Charges       03/21/17 1413 03/21/17 0924       Time Calculation    Start Time 1358  -CW      Stop Time 1413  -CW      Time Calculation (min) 15 min  -CW      PT Received On 03/21/17  -CW      PT - Next Appointment 03/22/17  -CW 03/21/17  -CW        User Key  (r) = Recorded By, (t) = Taken By, (c) = Cosigned By    Initials Name Provider Type    CW Tl Oro Physical Therapy Assistant          Therapy Charges for Today     Code Description Service Date Service Provider Modifiers Qty    01368561526 HC PT THER PROC EA 15 MIN 3/20/2017 Tl Oro GP 1    74419651279 HC PT THER SUPP EA 15 MIN 3/21/2017 Tl Oro GP 1    56130122715 HC PT THER PROC EA 15 MIN 3/21/2017 Tl Oro GP 1          PT G-Codes  Outcome Measure Options: AM-PAC 6 Clicks Basic Mobility (PT)    Tl Oro  3/21/2017

## 2017-03-21 NOTE — PLAN OF CARE
Problem: Patient Care Overview (Adult)  Goal: Plan of Care Review  Outcome: Ongoing (interventions implemented as appropriate)    03/21/17 1529   Coping/Psychosocial Response Interventions   Plan Of Care Reviewed With patient   Patient Care Overview   Progress improving   Outcome Evaluation   Outcome Summary/Follow up Plan VSSGLENROY plan to [place] today, HD ordered for 3/22, worked with PT, & turning in bed w/ assist & using IS more today       Goal: Adult Individualization and Mutuality  Outcome: Ongoing (interventions implemented as appropriate)  Goal: Discharge Needs Assessment  Outcome: Ongoing (interventions implemented as appropriate)    Problem: Sepsis (Adult)  Goal: Signs and Symptoms of Listed Potential Problems Will be Absent or Manageable (Sepsis)  Outcome: Ongoing (interventions implemented as appropriate)    Problem: Wound, Traumatic, Nonburn (Adult)  Goal: Signs and Symptoms of Listed Potential Problems Will be Absent or Manageable (Wound, Traumatic, Nonburn)  Outcome: Ongoing (interventions implemented as appropriate)    Problem: Fall Risk (Adult)  Goal: Identify Related Risk Factors and Signs and Symptoms  Outcome: Ongoing (interventions implemented as appropriate)  Goal: Absence of Falls  Outcome: Ongoing (interventions implemented as appropriate)    Problem: Urine Elimination, Impaired (Adult)  Goal: Effective Urinary Elimination  Outcome: Ongoing (interventions implemented as appropriate)  Goal: Reduced Incontinence Episodes  Outcome: Ongoing (interventions implemented as appropriate)
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
## 2017-03-21 NOTE — PLAN OF CARE
Problem: Patient Care Overview (Adult)  Goal: Plan of Care Review  Outcome: Ongoing (interventions implemented as appropriate)    03/21/17 1412   Coping/Psychosocial Response Interventions   Plan Of Care Reviewed With patient   Patient Care Overview   Progress progress towards functional goals is fair   Outcome Evaluation   Outcome Summary/Follow up Plan Pt increasing with bed mobility and strength

## 2017-03-21 NOTE — CONSULTS
"Adult Nutrition  Assessment/PES    Patient Name:  Luca Amezcua  YOB: 1955  MRN: [id number]  Admit Date:  3/9/2017    Assessment Date:  3/21/2017  Consult for poor wound healing. Assessment completed and will add appropriate supplements to promote wound healing.      Reason for Assessment       03/21/17 0912    Reason for Assessment    Reason For Assessment/Visit physician consult    Infectious Disease MRSA;Sepsis   of the wound    Renal Hemodialysis    Skin --   poor wound healing not progressing as anticipated              Nutrition/Diet History       03/21/17 0912    Nutrition/Diet History    Factors Affecting Nutritional Intake Factors    Other sleepy this am            Anthropometrics       03/21/17 0940    Anthropometrics    Height 162.6 cm (64.02\")    Weight 102 kg (225 lb 3.2 oz)    Ideal Body Weight (IBW)    Ideal Body Weight (IBW), Female 55.44    % Ideal Body Weight 184.64    Usual Body Weight (UBW)    Usual Body Weight 106 kg (233 lb)    % Usual Body Weight 96.65    Weight Loss 3.629 kg (8 lb)    % Weight Loss  3.4 %    Weight Loss Time Frame 1 week    Body Mass Index (BMI)    BMI (kg/m2) 38.72            Labs/Tests/Procedures/Meds       03/21/17 0913    Labs/Tests/Procedures/Meds    Diagnostic Test/Procedure Review reviewed   3/16 right internal jugular palindrome catheter     Labs/Tests Review Reviewed;Na+;Glucose;BUN;Creat;Alb;Hgb Hct;WBC;ALT;AST    Procedure Review SLP    Swallow eval status Done   reeval done 3/17    Medication Review Reviewed, pertinent;Insulin;Antibiotic;Laxative    Significant Vitals reviewed            Physical Findings       03/21/17 0934    Physical Appearance    Skin --   ldft groin with wound vac, abrasions, blister, etc              Nutrition Prescription Ordered       03/21/17 0935    Nutrition Prescription PO    Current PO Diet Regular    Common Modifiers Renal            Evaluation of Received Nutrient/Fluid Intake       03/21/17 0936    PO Evaluation "    % PO Intake 25% this am for breakfast              Malnutrition Severity Assessment       03/21/17 0941    Malnutrition Severity Assessment    Malnutrition Type Acute Illness/Injury Malnutrition    Weight Status (Acute)    Weight Loss Mild (>2% / 1 mo)    Energy Intake Status (Acute)    Energy Intake Mild (<75% / 5d)    Criteria Met (Must meet criteria for severity in at least 2 of these categories: M Wasting, Fat Loss, Fluid, Secondary Signs, Wt. Status, Intake)    Patient meets criteria for  Mild malnutrition          Problem/Interventions:                  Intervention Goal       03/21/17 0943    Intervention Goal    General Meet nutritional needs for age/condition;Maintain nutrition;Disease management/therapy    PO Tolerate PO;PO intake (%)    PO Intake % 75 %    Weight No significant weight loss            Nutrition Intervention       03/21/17 0944    Nutrition Intervention    RD/Tech Action Care plan reviewd;Follow Tx progress    Recommended/Ordered Supplement            Nutrition Prescription       03/21/17 0944    Nutrition Prescription PO    Supplement Nepro;Carl    Supplement Frequency Daily            Education/Evaluation       03/21/17 0945    Education    Education Will Instruct as appropriate    Monitor/Evaluation    Monitor Per protocol;PO intake;Supplement intake;Pertinent labs;Weight;Skin status          Electronically signed by:  Nita Stafford RD  03/21/17 9:48 AM

## 2017-03-21 NOTE — PLAN OF CARE
Problem: Patient Care Overview (Adult)  Goal: Plan of Care Review  Outcome: Ongoing (interventions implemented as appropriate)    03/21/17 0531   Coping/Psychosocial Response Interventions   Plan Of Care Reviewed With patient   Patient Care Overview   Progress improving   Outcome Evaluation   Outcome Summary/Follow up Plan vitals stable, am labs, wound vac intact, encourage pt turn and activity w/ PT, nutrition to see, hopefully d/c today         Problem: Sepsis (Adult)  Goal: Signs and Symptoms of Listed Potential Problems Will be Absent or Manageable (Sepsis)  Outcome: Ongoing (interventions implemented as appropriate)    Problem: Wound, Traumatic, Nonburn (Adult)  Goal: Signs and Symptoms of Listed Potential Problems Will be Absent or Manageable (Wound, Traumatic, Nonburn)  Outcome: Ongoing (interventions implemented as appropriate)    Problem: Fall Risk (Adult)  Goal: Identify Related Risk Factors and Signs and Symptoms  Outcome: Ongoing (interventions implemented as appropriate)

## 2017-03-21 NOTE — PROGRESS NOTES
Continued Stay Note  [facility]     Patient Name: Luca Amezcua  MRN: [id number]  Today's Date: 3/21/2017    Admit Date: 3/9/2017          Discharge Plan       03/21/17 0959    Case Management/Social Work Plan    [facility]     Additional Comments Called and s/w Lizet/Rigoberto and she states pt has been accepted and has a bed available at time of discharge. Anticipate discharge today per MD note. CCP will follow..............VERONIQUE Grande, CCP              Discharge Codes     None        Expected Discharge Date and Time     Expected Discharge Date Expected Discharge Time    Mar 21, 2017             Patrice Esteban RN

## 2017-03-22 NOTE — SIGNIFICANT NOTE
03/22/17 1626   PT Discharge Summary   Reason for Discharge Discharge from facility   Outcomes Achieved Refer to plan of care for updates on goals achieved   Discharge Destination [facility]

## 2017-03-22 NOTE — PLAN OF CARE
Problem: Inpatient Physical Therapy  Goal: Bed Mobility Goal LTG- PT  Outcome: Unable to achieve outcome(s) by discharge Date Met:  03/22/17 03/22/17 1624   Bed Mobility PT LTG   Bed Mobility PT LTG, Outcome goal not met   Bed Mobility PT LTG, Reason Goal Not Met discharged from facility       Goal: Transfer Training Goal 1 LTG- PT  Outcome: Unable to achieve outcome(s) by discharge Date Met:  03/22/17 03/22/17 1624   Transfer Training PT LTG   Transfer Training PT LTG, Outcome goal not met   Transfer Training PT LTG, Reason Goal Not Met discharged from facility       Goal: Gait Training Goal LTG- PT  Outcome: Unable to achieve outcome(s) by discharge Date Met:  03/22/17 03/22/17 1624   Gait Training PT LTG   Gait Training Goal PT LTG, Outcome goal not met   Gait Training Goal PT LTG, Reason Goal Not Met discharged from facility       Goal: Dynamic Sitting Balance Goal LTG- PT  Outcome: Outcome(s) achieved Date Met:  03/22/17 03/22/17 1624   Dynamic Sitting Balance PT LTG   Dynamic Sitting Balance PT LTG, Outcome goal met

## 2017-03-24 LAB
BACTERIA SPEC AEROBE CULT: NORMAL
BACTERIA SPEC AEROBE CULT: NORMAL

## 2017-04-07 LAB — FUNGUS WND CULT: NORMAL

## 2017-04-25 ENCOUNTER — OUTSIDE FACILITY SERVICE (OUTPATIENT)
Dept: FAMILY MEDICINE CLINIC | Facility: CLINIC | Age: 62
End: 2017-04-25

## 2017-04-25 PROCEDURE — 99309 SBSQ NF CARE MODERATE MDM 30: CPT | Performed by: NURSE PRACTITIONER

## 2017-05-02 ENCOUNTER — OUTSIDE FACILITY SERVICE (OUTPATIENT)
Dept: FAMILY MEDICINE CLINIC | Facility: CLINIC | Age: 62
End: 2017-05-02

## 2017-05-02 PROCEDURE — 99305 1ST NF CARE MODERATE MDM 35: CPT | Performed by: FAMILY MEDICINE

## 2017-05-08 ENCOUNTER — OFFICE VISIT (OUTPATIENT)
Dept: SURGERY | Facility: CLINIC | Age: 62
End: 2017-05-08

## 2017-05-08 DIAGNOSIS — M72.6 NECROTIZING FASCIITIS (HCC): Primary | ICD-10-CM

## 2017-05-08 PROCEDURE — 99024 POSTOP FOLLOW-UP VISIT: CPT | Performed by: SURGERY

## 2017-05-08 RX ORDER — CALCIUM ACETATE 667 MG/1
667 CAPSULE ORAL DAILY
COMMUNITY
End: 2020-06-23 | Stop reason: HOSPADM

## 2017-05-08 RX ORDER — FAMOTIDINE 20 MG/1
20 TABLET, FILM COATED ORAL DAILY
COMMUNITY

## 2017-06-06 ENCOUNTER — OUTSIDE FACILITY SERVICE (OUTPATIENT)
Dept: FAMILY MEDICINE CLINIC | Facility: CLINIC | Age: 62
End: 2017-06-06

## 2017-06-06 PROCEDURE — 99308 SBSQ NF CARE LOW MDM 20: CPT | Performed by: NURSE PRACTITIONER

## 2017-06-19 ENCOUNTER — OFFICE VISIT (OUTPATIENT)
Dept: SURGERY | Facility: CLINIC | Age: 62
End: 2017-06-19

## 2017-06-19 DIAGNOSIS — M72.6 NECROTIZING FASCIITIS (HCC): Primary | ICD-10-CM

## 2017-06-19 PROCEDURE — 99211 OFF/OP EST MAY X REQ PHY/QHP: CPT | Performed by: SURGERY

## 2017-06-19 RX ORDER — IPRATROPIUM BROMIDE AND ALBUTEROL SULFATE 2.5; .5 MG/3ML; MG/3ML
3 SOLUTION RESPIRATORY (INHALATION)
COMMUNITY
End: 2017-08-02

## 2017-06-19 NOTE — PROGRESS NOTES
Follow-up debridement of bilateral groins    Subjective:  No complaints from patient.  She tolerates dressings well.    Objective: Bilateral groin wounds are clean.  The right is much smaller than the left and is nearly healed.  Both a very nice granulation base and show no signs of infection.    Assessment and plan:  Status post bilateral pressure sores to the groins.  She is doing much better.  Continue dressings.  I will see her back in 6 weeks' time and I anticipate that by then the wounds will essentially be healed.    Andrea Fried MD  General and Endoscopic Surgery  [facility]    [address] [place] [address], [address]  [place], [place], [address]  P: [phone]  F: [phone]

## 2017-07-20 ENCOUNTER — OUTSIDE FACILITY SERVICE (OUTPATIENT)
Dept: FAMILY MEDICINE CLINIC | Facility: CLINIC | Age: 62
End: 2017-07-20

## 2017-07-20 PROCEDURE — 99307 SBSQ NF CARE SF MDM 10: CPT | Performed by: FAMILY MEDICINE

## 2017-08-01 ENCOUNTER — OUTSIDE FACILITY SERVICE (OUTPATIENT)
Dept: FAMILY MEDICINE CLINIC | Facility: CLINIC | Age: 62
End: 2017-08-01

## 2017-08-01 PROCEDURE — 99315 NF DSCHRG MGMT 30 MIN/LESS: CPT | Performed by: NURSE PRACTITIONER

## 2017-08-02 ENCOUNTER — OFFICE VISIT (OUTPATIENT)
Dept: SURGERY | Facility: CLINIC | Age: 62
End: 2017-08-02

## 2017-08-02 DIAGNOSIS — M72.6 NECROTIZING FASCIITIS (HCC): Primary | ICD-10-CM

## 2017-08-02 PROCEDURE — 99212 OFFICE O/P EST SF 10 MIN: CPT | Performed by: SURGERY

## 2017-08-02 RX ORDER — ESCITALOPRAM OXALATE 10 MG/1
10 TABLET ORAL DAILY
COMMUNITY

## 2017-08-02 NOTE — PROGRESS NOTES
Follow-up bilateral groin wounds    Subjective:  This patient is now over 4 months out from debridement of bilateral groin wounds related to being stuck in a single position for a long period of time.  She has no pain.  She is completing her rehabilitation and will be released later this week.  Her dressings have been going very well.    Objective:  Bilateral groin wounds have epithelialized completely.  There is no space for anymore dressing.  The folds are still little bit irritated, relating to her body habitus.    Assessment and plan:  Status post bilateral groin debridement, now doing very well.  No need for further dressings.  Follow up here as needed.    Andrea Fried MD  General and Endoscopic Surgery  [facility]    [address], [address]  [place], [place], [address]  P: [phone]  F: [phone]

## 2019-03-16 NOTE — PLAN OF CARE
Problem: Patient Care Overview (Adult)  Goal: Plan of Care Review    03/13/17 1158   Coping/Psychosocial Response Interventions   Plan Of Care Reviewed With patient   Outcome Evaluation   Outcome Summary/Follow up Plan Pt. presents with overall decrease in strength, AROM, tolerance to functional activity that prohibits pt. in everyday functional tasks. Pt. will benefit from skilled inpt. P.T. to address her functional deficits and to assist pt. in regaining her maximum level of independence with functional mobility.         Problem: Inpatient Physical Therapy  Goal: Bed Mobility Goal LTG- PT    03/13/17 1158   Bed Mobility PT LTG   Bed Mobility PT LTG, Date Established 03/13/17   Bed Mobility PT LTG, Time to Achieve 5 - 7 days   Bed Mobility PT LTG, Activity Type all bed mobility   Bed Mobility PT LTG, [place] Level minimum assist (75% patient effort)       Goal: Transfer Training Goal 1 LTG- PT    03/13/17 1158   Transfer Training PT LTG   Transfer Training PT LTG, Date Established 03/13/17   Transfer Training PT LTG, Time to Achieve 5 - 7 days   Transfer Training PT LTG, Activity Type bed to chair /chair to bed;sit to stand/stand to sit   Transfer Training PT LTG, [place] Level minimum assist (75% patient effort)   Transfer Training PT LTG, Assist Device (With A.A.D.)       Goal: Gait Training Goal LTG- PT    03/13/17 1158   Gait Training PT LTG   Gait Training Goal PT LTG, Date Established 03/13/17   Gait Training Goal PT LTG, Time to Achieve 5 - 7 days   Gait Training Goal PT LTG, [place] Level minimum assist (75% patient effort)   Gait Training Goal PT LTG, Assist Device (With A.A.D.)   Gait Training Goal PT LTG, Distance to Achieve 100 feet       Goal: Dynamic Sitting Balance Goal LTG- PT    03/13/17 1158   Dynamic Sitting Balance PT LTG   Dynamic Sitting Balance PT LTG, Date Established 03/13/17   Dynamic Sitting Balance PT LTG, Time to Achieve 5 - 7 days   Dynamic Sitting Balance PT LTG,  [place] Level independent   Dynamic Sitting Balance PT LTG, Assist Device UE Support            I have personally performed a face to face diagnostic evaluation on this patient. I have reviewed the ACP note and agree with the history, exam and plan of care, except as noted.

## 2020-06-19 ENCOUNTER — APPOINTMENT (OUTPATIENT)
Dept: CARDIOLOGY | Facility: HOSPITAL | Age: 65
End: 2020-06-19

## 2020-06-19 ENCOUNTER — HOSPITAL ENCOUNTER (INPATIENT)
Facility: HOSPITAL | Age: 65
LOS: 4 days | Discharge: HOME | End: 2020-06-23
Attending: EMERGENCY MEDICINE | Admitting: HOSPITALIST

## 2020-06-19 DIAGNOSIS — L03.115 CELLULITIS OF RIGHT LOWER EXTREMITY: Primary | ICD-10-CM

## 2020-06-19 LAB
ALBUMIN SERPL-MCNC: 3.8 G/DL (ref 3.5–5.2)
ALBUMIN/GLOB SERPL: 1.3 G/DL
ALP SERPL-CCNC: 129 U/L (ref 39–117)
ALT SERPL W P-5'-P-CCNC: 11 U/L (ref 1–33)
ANION GAP SERPL CALCULATED.3IONS-SCNC: 9.6 MMOL/L (ref 5–15)
AST SERPL-CCNC: 15 U/L (ref 1–32)
BASOPHILS # BLD AUTO: 0.04 10*3/MM3 (ref 0–0.2)
BASOPHILS NFR BLD AUTO: 0.5 % (ref 0–1.5)
BH CV LOWER VASCULAR LEFT COMMON FEMORAL AUGMENT: NORMAL
BH CV LOWER VASCULAR LEFT COMMON FEMORAL COMPETENT: NORMAL
BH CV LOWER VASCULAR LEFT COMMON FEMORAL COMPRESS: NORMAL
BH CV LOWER VASCULAR LEFT COMMON FEMORAL PHASIC: NORMAL
BH CV LOWER VASCULAR LEFT COMMON FEMORAL SPONT: NORMAL
BH CV LOWER VASCULAR RIGHT COMMON FEMORAL AUGMENT: NORMAL
BH CV LOWER VASCULAR RIGHT COMMON FEMORAL COMPETENT: NORMAL
BH CV LOWER VASCULAR RIGHT COMMON FEMORAL COMPRESS: NORMAL
BH CV LOWER VASCULAR RIGHT COMMON FEMORAL PHASIC: NORMAL
BH CV LOWER VASCULAR RIGHT COMMON FEMORAL SPONT: NORMAL
BH CV LOWER VASCULAR RIGHT DISTAL FEMORAL COMPRESS: NORMAL
BH CV LOWER VASCULAR RIGHT GASTRONEMIUS COMPRESS: NORMAL
BH CV LOWER VASCULAR RIGHT GREATER SAPH AK COMPRESS: NORMAL
BH CV LOWER VASCULAR RIGHT GREATER SAPH BK COMPRESS: NORMAL
BH CV LOWER VASCULAR RIGHT MID FEMORAL AUGMENT: NORMAL
BH CV LOWER VASCULAR RIGHT MID FEMORAL COMPETENT: NORMAL
BH CV LOWER VASCULAR RIGHT MID FEMORAL COMPRESS: NORMAL
BH CV LOWER VASCULAR RIGHT MID FEMORAL PHASIC: NORMAL
BH CV LOWER VASCULAR RIGHT MID FEMORAL SPONT: NORMAL
BH CV LOWER VASCULAR RIGHT PERONEAL COMPRESS: NORMAL
BH CV LOWER VASCULAR RIGHT POPLITEAL AUGMENT: NORMAL
BH CV LOWER VASCULAR RIGHT POPLITEAL COMPETENT: NORMAL
BH CV LOWER VASCULAR RIGHT POPLITEAL COMPRESS: NORMAL
BH CV LOWER VASCULAR RIGHT POPLITEAL PHASIC: NORMAL
BH CV LOWER VASCULAR RIGHT POPLITEAL SPONT: NORMAL
BH CV LOWER VASCULAR RIGHT POSTERIOR TIBIAL COMPRESS: NORMAL
BH CV LOWER VASCULAR RIGHT PROFUNDA FEMORAL COMPRESS: NORMAL
BH CV LOWER VASCULAR RIGHT PROXIMAL FEMORAL COMPRESS: NORMAL
BH CV LOWER VASCULAR RIGHT SAPHENOFEMORAL JUNCTION COMPRESS: NORMAL
BH CV LOWER VASCULAR RIGHT SOLEAL COMPRESS: NORMAL
BILIRUB SERPL-MCNC: 0.3 MG/DL (ref 0.2–1.2)
BUN BLD-MCNC: 11 MG/DL (ref 8–23)
BUN/CREAT SERPL: 18.3 (ref 7–25)
CALCIUM SPEC-SCNC: 9 MG/DL (ref 8.6–10.5)
CHLORIDE SERPL-SCNC: 102 MMOL/L (ref 98–107)
CO2 SERPL-SCNC: 25.4 MMOL/L (ref 22–29)
CREAT BLD-MCNC: 0.6 MG/DL (ref 0.57–1)
CRP SERPL-MCNC: 1.35 MG/DL (ref 0–0.5)
DEPRECATED RDW RBC AUTO: 42.8 FL (ref 37–54)
EOSINOPHIL # BLD AUTO: 0.12 10*3/MM3 (ref 0–0.4)
EOSINOPHIL NFR BLD AUTO: 1.5 % (ref 0.3–6.2)
ERYTHROCYTE [DISTWIDTH] IN BLOOD BY AUTOMATED COUNT: 13.7 % (ref 12.3–15.4)
ERYTHROCYTE [SEDIMENTATION RATE] IN BLOOD: 32 MM/HR (ref 0–30)
GFR SERPL CREATININE-BSD FRML MDRD: 100 ML/MIN/1.73
GLOBULIN UR ELPH-MCNC: 3 GM/DL
GLUCOSE BLD-MCNC: 108 MG/DL (ref 65–99)
HCT VFR BLD AUTO: 37.8 % (ref 34–46.6)
HGB BLD-MCNC: 12.1 G/DL (ref 12–15.9)
IMM GRANULOCYTES # BLD AUTO: 0.01 10*3/MM3 (ref 0–0.05)
IMM GRANULOCYTES NFR BLD AUTO: 0.1 % (ref 0–0.5)
LYMPHOCYTES # BLD AUTO: 1.8 10*3/MM3 (ref 0.7–3.1)
LYMPHOCYTES NFR BLD AUTO: 22.8 % (ref 19.6–45.3)
MCH RBC QN AUTO: 27.6 PG (ref 26.6–33)
MCHC RBC AUTO-ENTMCNC: 32 G/DL (ref 31.5–35.7)
MCV RBC AUTO: 86.3 FL (ref 79–97)
MONOCYTES # BLD AUTO: 0.55 10*3/MM3 (ref 0.1–0.9)
MONOCYTES NFR BLD AUTO: 7 % (ref 5–12)
NEUTROPHILS # BLD AUTO: 5.38 10*3/MM3 (ref 1.7–7)
NEUTROPHILS NFR BLD AUTO: 68.1 % (ref 42.7–76)
NRBC BLD AUTO-RTO: 0 /100 WBC (ref 0–0.2)
PLATELET # BLD AUTO: 234 10*3/MM3 (ref 140–450)
PMV BLD AUTO: 10.7 FL (ref 6–12)
POTASSIUM BLD-SCNC: 3.7 MMOL/L (ref 3.5–5.2)
PROT SERPL-MCNC: 6.8 G/DL (ref 6–8.5)
RBC # BLD AUTO: 4.38 10*6/MM3 (ref 3.77–5.28)
SODIUM BLD-SCNC: 137 MMOL/L (ref 136–145)
WBC NRBC COR # BLD: 7.9 10*3/MM3 (ref 3.4–10.8)

## 2020-06-19 PROCEDURE — 25010000002 PIPERACILLIN SOD-TAZOBACTAM PER 1 G: Performed by: PHYSICIAN ASSISTANT

## 2020-06-19 PROCEDURE — 80053 COMPREHEN METABOLIC PANEL: CPT | Performed by: PHYSICIAN ASSISTANT

## 2020-06-19 PROCEDURE — 25010000002 VANCOMYCIN 10 G RECONSTITUTED SOLUTION: Performed by: PHYSICIAN ASSISTANT

## 2020-06-19 PROCEDURE — 85025 COMPLETE CBC W/AUTO DIFF WBC: CPT | Performed by: PHYSICIAN ASSISTANT

## 2020-06-19 PROCEDURE — 85652 RBC SED RATE AUTOMATED: CPT | Performed by: PHYSICIAN ASSISTANT

## 2020-06-19 PROCEDURE — 86140 C-REACTIVE PROTEIN: CPT | Performed by: PHYSICIAN ASSISTANT

## 2020-06-19 PROCEDURE — 25010000002 PIPERACILLIN SOD-TAZOBACTAM PER 1 G: Performed by: HOSPITALIST

## 2020-06-19 PROCEDURE — 99284 EMERGENCY DEPT VISIT MOD MDM: CPT

## 2020-06-19 PROCEDURE — 93971 EXTREMITY STUDY: CPT

## 2020-06-19 RX ORDER — FAMOTIDINE 20 MG/1
20 TABLET, FILM COATED ORAL 2 TIMES DAILY
Status: DISCONTINUED | OUTPATIENT
Start: 2020-06-19 | End: 2020-06-23 | Stop reason: HOSPADM

## 2020-06-19 RX ORDER — VANCOMYCIN HYDROCHLORIDE 1 G/200ML
1000 INJECTION, SOLUTION INTRAVENOUS EVERY 12 HOURS
Status: DISCONTINUED | OUTPATIENT
Start: 2020-06-20 | End: 2020-06-21

## 2020-06-19 RX ORDER — FAMOTIDINE 20 MG/1
20 TABLET, FILM COATED ORAL DAILY
Status: DISCONTINUED | OUTPATIENT
Start: 2020-06-20 | End: 2020-06-19

## 2020-06-19 RX ORDER — CALCIUM ACETATE 667 MG/1
667 CAPSULE ORAL
Status: DISCONTINUED | OUTPATIENT
Start: 2020-06-20 | End: 2020-06-19

## 2020-06-19 RX ORDER — ESCITALOPRAM OXALATE 10 MG/1
10 TABLET ORAL DAILY
Status: DISCONTINUED | OUTPATIENT
Start: 2020-06-20 | End: 2020-06-20

## 2020-06-19 RX ORDER — ASPIRIN 81 MG/1
81 TABLET, CHEWABLE ORAL DAILY
Status: DISCONTINUED | OUTPATIENT
Start: 2020-06-20 | End: 2020-06-23 | Stop reason: HOSPADM

## 2020-06-19 RX ADMIN — FAMOTIDINE 20 MG: 20 TABLET, FILM COATED ORAL at 21:30

## 2020-06-19 RX ADMIN — VANCOMYCIN HYDROCHLORIDE 1500 MG: 10 INJECTION, POWDER, LYOPHILIZED, FOR SOLUTION INTRAVENOUS at 17:27

## 2020-06-19 RX ADMIN — TAZOBACTAM SODIUM AND PIPERACILLIN SODIUM 3.38 G: 375; 3 INJECTION, SOLUTION INTRAVENOUS at 23:54

## 2020-06-19 RX ADMIN — TAZOBACTAM SODIUM AND PIPERACILLIN SODIUM 3.38 G: 375; 3 INJECTION, SOLUTION INTRAVENOUS at 16:55

## 2020-06-20 LAB
ALBUMIN SERPL-MCNC: 3.2 G/DL (ref 3.5–5.2)
ALBUMIN/GLOB SERPL: 1 G/DL
ALP SERPL-CCNC: 116 U/L (ref 39–117)
ALT SERPL W P-5'-P-CCNC: 10 U/L (ref 1–33)
ANION GAP SERPL CALCULATED.3IONS-SCNC: 10.3 MMOL/L (ref 5–15)
AST SERPL-CCNC: 15 U/L (ref 1–32)
BASOPHILS # BLD AUTO: 0.05 10*3/MM3 (ref 0–0.2)
BASOPHILS NFR BLD AUTO: 0.6 % (ref 0–1.5)
BILIRUB SERPL-MCNC: 0.5 MG/DL (ref 0.2–1.2)
BUN BLD-MCNC: 10 MG/DL (ref 8–23)
BUN/CREAT SERPL: 13.7 (ref 7–25)
CALCIUM SPEC-SCNC: 8.6 MG/DL (ref 8.6–10.5)
CHLORIDE SERPL-SCNC: 105 MMOL/L (ref 98–107)
CHOLEST SERPL-MCNC: 155 MG/DL (ref 0–200)
CO2 SERPL-SCNC: 24.7 MMOL/L (ref 22–29)
CREAT BLD-MCNC: 0.73 MG/DL (ref 0.57–1)
DEPRECATED RDW RBC AUTO: 45.9 FL (ref 37–54)
EOSINOPHIL # BLD AUTO: 0.23 10*3/MM3 (ref 0–0.4)
EOSINOPHIL NFR BLD AUTO: 2.6 % (ref 0.3–6.2)
ERYTHROCYTE [DISTWIDTH] IN BLOOD BY AUTOMATED COUNT: 14.1 % (ref 12.3–15.4)
GFR SERPL CREATININE-BSD FRML MDRD: 80 ML/MIN/1.73
GLOBULIN UR ELPH-MCNC: 3.1 GM/DL
GLUCOSE BLD-MCNC: 101 MG/DL (ref 65–99)
HBA1C MFR BLD: 6.2 % (ref 4.8–5.6)
HCT VFR BLD AUTO: 38.8 % (ref 34–46.6)
HDLC SERPL-MCNC: 45 MG/DL (ref 40–60)
HGB BLD-MCNC: 12.5 G/DL (ref 12–15.9)
IMM GRANULOCYTES # BLD AUTO: 0.03 10*3/MM3 (ref 0–0.05)
IMM GRANULOCYTES NFR BLD AUTO: 0.3 % (ref 0–0.5)
LDLC SERPL CALC-MCNC: 91 MG/DL (ref 0–100)
LDLC/HDLC SERPL: 2.03 {RATIO}
LYMPHOCYTES # BLD AUTO: 1.05 10*3/MM3 (ref 0.7–3.1)
LYMPHOCYTES NFR BLD AUTO: 11.8 % (ref 19.6–45.3)
MCH RBC QN AUTO: 28.1 PG (ref 26.6–33)
MCHC RBC AUTO-ENTMCNC: 32.2 G/DL (ref 31.5–35.7)
MCV RBC AUTO: 87.2 FL (ref 79–97)
MONOCYTES # BLD AUTO: 0.58 10*3/MM3 (ref 0.1–0.9)
MONOCYTES NFR BLD AUTO: 6.5 % (ref 5–12)
NEUTROPHILS # BLD AUTO: 6.97 10*3/MM3 (ref 1.7–7)
NEUTROPHILS NFR BLD AUTO: 78.2 % (ref 42.7–76)
NRBC BLD AUTO-RTO: 0 /100 WBC (ref 0–0.2)
NT-PROBNP SERPL-MCNC: 641.1 PG/ML (ref 5–900)
PLATELET # BLD AUTO: 236 10*3/MM3 (ref 140–450)
PMV BLD AUTO: 11.1 FL (ref 6–12)
POTASSIUM BLD-SCNC: 4.1 MMOL/L (ref 3.5–5.2)
PROT SERPL-MCNC: 6.3 G/DL (ref 6–8.5)
RBC # BLD AUTO: 4.45 10*6/MM3 (ref 3.77–5.28)
SODIUM BLD-SCNC: 140 MMOL/L (ref 136–145)
TRIGL SERPL-MCNC: 93 MG/DL (ref 0–150)
TSH SERPL DL<=0.05 MIU/L-ACNC: 3.61 UIU/ML (ref 0.27–4.2)
VLDLC SERPL-MCNC: 18.6 MG/DL (ref 5–40)
WBC NRBC COR # BLD: 8.91 10*3/MM3 (ref 3.4–10.8)

## 2020-06-20 PROCEDURE — 85025 COMPLETE CBC W/AUTO DIFF WBC: CPT | Performed by: HOSPITALIST

## 2020-06-20 PROCEDURE — 80053 COMPREHEN METABOLIC PANEL: CPT | Performed by: HOSPITALIST

## 2020-06-20 PROCEDURE — 83880 ASSAY OF NATRIURETIC PEPTIDE: CPT | Performed by: HOSPITALIST

## 2020-06-20 PROCEDURE — 83036 HEMOGLOBIN GLYCOSYLATED A1C: CPT | Performed by: HOSPITALIST

## 2020-06-20 PROCEDURE — 25010000002 PIPERACILLIN SOD-TAZOBACTAM PER 1 G: Performed by: HOSPITALIST

## 2020-06-20 PROCEDURE — 84443 ASSAY THYROID STIM HORMONE: CPT | Performed by: HOSPITALIST

## 2020-06-20 PROCEDURE — 25010000002 VANCOMYCIN PER 500 MG: Performed by: HOSPITALIST

## 2020-06-20 PROCEDURE — 80061 LIPID PANEL: CPT | Performed by: HOSPITALIST

## 2020-06-20 RX ORDER — ESCITALOPRAM OXALATE 10 MG/1
10 TABLET ORAL NIGHTLY
Status: DISCONTINUED | OUTPATIENT
Start: 2020-06-20 | End: 2020-06-23 | Stop reason: HOSPADM

## 2020-06-20 RX ORDER — SODIUM CHLORIDE 9 MG/ML
75 INJECTION, SOLUTION INTRAVENOUS CONTINUOUS
Status: DISCONTINUED | OUTPATIENT
Start: 2020-06-20 | End: 2020-06-21

## 2020-06-20 RX ORDER — NYSTATIN 100000 [USP'U]/G
POWDER TOPICAL EVERY 12 HOURS SCHEDULED
Status: DISCONTINUED | OUTPATIENT
Start: 2020-06-20 | End: 2020-06-23 | Stop reason: HOSPADM

## 2020-06-20 RX ADMIN — ESCITALOPRAM 10 MG: 10 TABLET, FILM COATED ORAL at 20:19

## 2020-06-20 RX ADMIN — ASPIRIN 81 MG: 81 TABLET, CHEWABLE ORAL at 15:26

## 2020-06-20 RX ADMIN — SODIUM CHLORIDE 75 ML/HR: 9 INJECTION, SOLUTION INTRAVENOUS at 15:26

## 2020-06-20 RX ADMIN — TAZOBACTAM SODIUM AND PIPERACILLIN SODIUM 3.38 G: 375; 3 INJECTION, SOLUTION INTRAVENOUS at 15:26

## 2020-06-20 RX ADMIN — VANCOMYCIN HYDROCHLORIDE 1000 MG: 1 INJECTION, SOLUTION INTRAVENOUS at 06:48

## 2020-06-20 RX ADMIN — FAMOTIDINE 20 MG: 20 TABLET, FILM COATED ORAL at 20:19

## 2020-06-20 RX ADMIN — FAMOTIDINE 20 MG: 20 TABLET, FILM COATED ORAL at 09:50

## 2020-06-20 RX ADMIN — TAZOBACTAM SODIUM AND PIPERACILLIN SODIUM 3.38 G: 375; 3 INJECTION, SOLUTION INTRAVENOUS at 09:49

## 2020-06-20 RX ADMIN — VANCOMYCIN HYDROCHLORIDE 1000 MG: 1 INJECTION, SOLUTION INTRAVENOUS at 18:27

## 2020-06-21 LAB
ANION GAP SERPL CALCULATED.3IONS-SCNC: 8.8 MMOL/L (ref 5–15)
BASOPHILS # BLD AUTO: 0.02 10*3/MM3 (ref 0–0.2)
BASOPHILS NFR BLD AUTO: 0.3 % (ref 0–1.5)
BUN BLD-MCNC: 15 MG/DL (ref 8–23)
BUN/CREAT SERPL: 16.5 (ref 7–25)
CALCIUM SPEC-SCNC: 8 MG/DL (ref 8.6–10.5)
CHLORIDE SERPL-SCNC: 106 MMOL/L (ref 98–107)
CO2 SERPL-SCNC: 23.2 MMOL/L (ref 22–29)
CREAT BLD-MCNC: 0.91 MG/DL (ref 0.57–1)
DEPRECATED RDW RBC AUTO: 43.7 FL (ref 37–54)
EOSINOPHIL # BLD AUTO: 0.37 10*3/MM3 (ref 0–0.4)
EOSINOPHIL NFR BLD AUTO: 5.4 % (ref 0.3–6.2)
ERYTHROCYTE [DISTWIDTH] IN BLOOD BY AUTOMATED COUNT: 13.8 % (ref 12.3–15.4)
GFR SERPL CREATININE-BSD FRML MDRD: 62 ML/MIN/1.73
GLUCOSE BLD-MCNC: 120 MG/DL (ref 65–99)
HCT VFR BLD AUTO: 35.1 % (ref 34–46.6)
HGB BLD-MCNC: 11.2 G/DL (ref 12–15.9)
IMM GRANULOCYTES # BLD AUTO: 0.02 10*3/MM3 (ref 0–0.05)
IMM GRANULOCYTES NFR BLD AUTO: 0.3 % (ref 0–0.5)
LYMPHOCYTES # BLD AUTO: 0.82 10*3/MM3 (ref 0.7–3.1)
LYMPHOCYTES NFR BLD AUTO: 12.1 % (ref 19.6–45.3)
MCH RBC QN AUTO: 27.7 PG (ref 26.6–33)
MCHC RBC AUTO-ENTMCNC: 31.9 G/DL (ref 31.5–35.7)
MCV RBC AUTO: 86.7 FL (ref 79–97)
MONOCYTES # BLD AUTO: 0.42 10*3/MM3 (ref 0.1–0.9)
MONOCYTES NFR BLD AUTO: 6.2 % (ref 5–12)
NEUTROPHILS # BLD AUTO: 5.15 10*3/MM3 (ref 1.7–7)
NEUTROPHILS NFR BLD AUTO: 75.7 % (ref 42.7–76)
NRBC BLD AUTO-RTO: 0 /100 WBC (ref 0–0.2)
PLATELET # BLD AUTO: 185 10*3/MM3 (ref 140–450)
PMV BLD AUTO: 10.8 FL (ref 6–12)
POTASSIUM BLD-SCNC: 4.1 MMOL/L (ref 3.5–5.2)
RBC # BLD AUTO: 4.05 10*6/MM3 (ref 3.77–5.28)
SODIUM BLD-SCNC: 138 MMOL/L (ref 136–145)
VANCOMYCIN TROUGH SERPL-MCNC: 24.9 MCG/ML (ref 5–20)
WBC NRBC COR # BLD: 6.8 10*3/MM3 (ref 3.4–10.8)

## 2020-06-21 PROCEDURE — 80202 ASSAY OF VANCOMYCIN: CPT | Performed by: HOSPITALIST

## 2020-06-21 PROCEDURE — 80048 BASIC METABOLIC PNL TOTAL CA: CPT | Performed by: HOSPITALIST

## 2020-06-21 PROCEDURE — 25010000002 PIPERACILLIN SOD-TAZOBACTAM PER 1 G: Performed by: HOSPITALIST

## 2020-06-21 PROCEDURE — 85025 COMPLETE CBC W/AUTO DIFF WBC: CPT | Performed by: HOSPITALIST

## 2020-06-21 PROCEDURE — 63710000001 DIPHENHYDRAMINE PER 50 MG: Performed by: HOSPITALIST

## 2020-06-21 RX ORDER — DIPHENHYDRAMINE HCL 25 MG
25 CAPSULE ORAL EVERY 6 HOURS PRN
Status: DISCONTINUED | OUTPATIENT
Start: 2020-06-21 | End: 2020-06-23 | Stop reason: HOSPADM

## 2020-06-21 RX ORDER — CETIRIZINE HYDROCHLORIDE 10 MG/1
10 TABLET ORAL DAILY
Status: DISCONTINUED | OUTPATIENT
Start: 2020-06-21 | End: 2020-06-23 | Stop reason: HOSPADM

## 2020-06-21 RX ADMIN — CETIRIZINE HYDROCHLORIDE 10 MG: 10 TABLET, FILM COATED ORAL at 16:45

## 2020-06-21 RX ADMIN — ASPIRIN 81 MG: 81 TABLET, CHEWABLE ORAL at 09:20

## 2020-06-21 RX ADMIN — TAZOBACTAM SODIUM AND PIPERACILLIN SODIUM 3.38 G: 375; 3 INJECTION, SOLUTION INTRAVENOUS at 00:31

## 2020-06-21 RX ADMIN — NYSTATIN: 100000 POWDER TOPICAL at 00:30

## 2020-06-21 RX ADMIN — SODIUM CHLORIDE 75 ML/HR: 9 INJECTION, SOLUTION INTRAVENOUS at 09:20

## 2020-06-21 RX ADMIN — NYSTATIN: 100000 POWDER TOPICAL at 21:16

## 2020-06-21 RX ADMIN — DIPHENHYDRAMINE HYDROCHLORIDE 25 MG: 25 CAPSULE ORAL at 16:45

## 2020-06-21 RX ADMIN — ESCITALOPRAM 10 MG: 10 TABLET, FILM COATED ORAL at 21:16

## 2020-06-21 RX ADMIN — TAZOBACTAM SODIUM AND PIPERACILLIN SODIUM 3.38 G: 375; 3 INJECTION, SOLUTION INTRAVENOUS at 23:29

## 2020-06-21 RX ADMIN — TAZOBACTAM SODIUM AND PIPERACILLIN SODIUM 3.38 G: 375; 3 INJECTION, SOLUTION INTRAVENOUS at 06:41

## 2020-06-21 RX ADMIN — TAZOBACTAM SODIUM AND PIPERACILLIN SODIUM 3.38 G: 375; 3 INJECTION, SOLUTION INTRAVENOUS at 16:45

## 2020-06-21 RX ADMIN — NYSTATIN: 100000 POWDER TOPICAL at 09:20

## 2020-06-21 RX ADMIN — FAMOTIDINE 20 MG: 20 TABLET, FILM COATED ORAL at 21:16

## 2020-06-21 RX ADMIN — FAMOTIDINE 20 MG: 20 TABLET, FILM COATED ORAL at 09:20

## 2020-06-22 ENCOUNTER — APPOINTMENT (OUTPATIENT)
Dept: CARDIOLOGY | Facility: HOSPITAL | Age: 65
End: 2020-06-22

## 2020-06-22 LAB
ANION GAP SERPL CALCULATED.3IONS-SCNC: 10.8 MMOL/L (ref 5–15)
BASOPHILS # BLD AUTO: 0 10*3/MM3 (ref 0–0.2)
BASOPHILS NFR BLD AUTO: 0 % (ref 0–1.5)
BH CV LOWER ARTERIAL LEFT ABI RATIO: 1.15
BH CV LOWER ARTERIAL LEFT DORSALIS PEDIS SYS MAX: 113 MMHG
BH CV LOWER ARTERIAL LEFT GREAT TOE SYS MAX: 82 MMHG
BH CV LOWER ARTERIAL LEFT POST TIBIAL SYS MAX: 134 MMHG
BH CV LOWER ARTERIAL LEFT TBI RATIO: 0.69
BH CV LOWER ARTERIAL RIGHT ABI RATIO: 1.18
BH CV LOWER ARTERIAL RIGHT DORSALIS PEDIS SYS MAX: 138 MMHG
BH CV LOWER ARTERIAL RIGHT GREAT TOE SYS MAX: 79 MMHG
BH CV LOWER ARTERIAL RIGHT POST TIBIAL SYS MAX: 119 MMHG
BH CV LOWER ARTERIAL RIGHT TBI RATIO: 0.68
BUN BLD-MCNC: 15 MG/DL (ref 8–23)
BUN/CREAT SERPL: 19 (ref 7–25)
CALCIUM SPEC-SCNC: 7.9 MG/DL (ref 8.6–10.5)
CHLORIDE SERPL-SCNC: 103 MMOL/L (ref 98–107)
CO2 SERPL-SCNC: 23.2 MMOL/L (ref 22–29)
CREAT BLD-MCNC: 0.79 MG/DL (ref 0.57–1)
DEPRECATED RDW RBC AUTO: 45.9 FL (ref 37–54)
EOSINOPHIL # BLD AUTO: 0.52 10*3/MM3 (ref 0–0.4)
EOSINOPHIL NFR BLD AUTO: 5.9 % (ref 0.3–6.2)
ERYTHROCYTE [DISTWIDTH] IN BLOOD BY AUTOMATED COUNT: 14 % (ref 12.3–15.4)
GFR SERPL CREATININE-BSD FRML MDRD: 73 ML/MIN/1.73
GLUCOSE BLD-MCNC: 110 MG/DL (ref 65–99)
HCT VFR BLD AUTO: 38.4 % (ref 34–46.6)
HGB BLD-MCNC: 12.2 G/DL (ref 12–15.9)
IMM GRANULOCYTES # BLD AUTO: 0.02 10*3/MM3 (ref 0–0.05)
IMM GRANULOCYTES NFR BLD AUTO: 0.2 % (ref 0–0.5)
LYMPHOCYTES # BLD AUTO: 0.77 10*3/MM3 (ref 0.7–3.1)
LYMPHOCYTES NFR BLD AUTO: 8.8 % (ref 19.6–45.3)
MCH RBC QN AUTO: 27.9 PG (ref 26.6–33)
MCHC RBC AUTO-ENTMCNC: 31.8 G/DL (ref 31.5–35.7)
MCV RBC AUTO: 87.7 FL (ref 79–97)
MONOCYTES # BLD AUTO: 0.36 10*3/MM3 (ref 0.1–0.9)
MONOCYTES NFR BLD AUTO: 4.1 % (ref 5–12)
NEUTROPHILS # BLD AUTO: 7.11 10*3/MM3 (ref 1.7–7)
NEUTROPHILS NFR BLD AUTO: 81 % (ref 42.7–76)
NRBC BLD AUTO-RTO: 0 /100 WBC (ref 0–0.2)
PLATELET # BLD AUTO: 214 10*3/MM3 (ref 140–450)
PMV BLD AUTO: 11 FL (ref 6–12)
POTASSIUM BLD-SCNC: 4 MMOL/L (ref 3.5–5.2)
RBC # BLD AUTO: 4.38 10*6/MM3 (ref 3.77–5.28)
SODIUM BLD-SCNC: 137 MMOL/L (ref 136–145)
UPPER ARTERIAL LEFT ARM BRACHIAL SYS MAX: 109 MMHG
UPPER ARTERIAL RIGHT ARM BRACHIAL SYS MAX: 117 MMHG
WBC NRBC COR # BLD: 8.78 10*3/MM3 (ref 3.4–10.8)

## 2020-06-22 PROCEDURE — 97162 PT EVAL MOD COMPLEX 30 MIN: CPT

## 2020-06-22 PROCEDURE — 97530 THERAPEUTIC ACTIVITIES: CPT

## 2020-06-22 PROCEDURE — 97165 OT EVAL LOW COMPLEX 30 MIN: CPT

## 2020-06-22 PROCEDURE — 97110 THERAPEUTIC EXERCISES: CPT

## 2020-06-22 PROCEDURE — 25010000002 PIPERACILLIN SOD-TAZOBACTAM PER 1 G: Performed by: HOSPITALIST

## 2020-06-22 PROCEDURE — 97535 SELF CARE MNGMENT TRAINING: CPT

## 2020-06-22 PROCEDURE — 93923 UPR/LXTR ART STDY 3+ LVLS: CPT

## 2020-06-22 PROCEDURE — 85025 COMPLETE CBC W/AUTO DIFF WBC: CPT | Performed by: HOSPITALIST

## 2020-06-22 PROCEDURE — 80048 BASIC METABOLIC PNL TOTAL CA: CPT | Performed by: HOSPITALIST

## 2020-06-22 RX ORDER — SULFAMETHOXAZOLE AND TRIMETHOPRIM 800; 160 MG/1; MG/1
1 TABLET ORAL EVERY 12 HOURS SCHEDULED
Status: DISCONTINUED | OUTPATIENT
Start: 2020-06-22 | End: 2020-06-23 | Stop reason: HOSPADM

## 2020-06-22 RX ORDER — AMOXICILLIN AND CLAVULANATE POTASSIUM 875; 125 MG/1; MG/1
1 TABLET, FILM COATED ORAL EVERY 12 HOURS SCHEDULED
Status: DISCONTINUED | OUTPATIENT
Start: 2020-06-22 | End: 2020-06-23 | Stop reason: HOSPADM

## 2020-06-22 RX ADMIN — FAMOTIDINE 20 MG: 20 TABLET, FILM COATED ORAL at 10:26

## 2020-06-22 RX ADMIN — AMOXICILLIN AND CLAVULANATE POTASSIUM 1 TABLET: 875; 125 TABLET, FILM COATED ORAL at 13:14

## 2020-06-22 RX ADMIN — ASPIRIN 81 MG: 81 TABLET, CHEWABLE ORAL at 08:32

## 2020-06-22 RX ADMIN — ESCITALOPRAM 10 MG: 10 TABLET, FILM COATED ORAL at 21:17

## 2020-06-22 RX ADMIN — CETIRIZINE HYDROCHLORIDE 10 MG: 10 TABLET, FILM COATED ORAL at 08:32

## 2020-06-22 RX ADMIN — AMOXICILLIN AND CLAVULANATE POTASSIUM 1 TABLET: 875; 125 TABLET, FILM COATED ORAL at 21:17

## 2020-06-22 RX ADMIN — SULFAMETHOXAZOLE AND TRIMETHOPRIM 160 MG: 800; 160 TABLET ORAL at 13:13

## 2020-06-22 RX ADMIN — TAZOBACTAM SODIUM AND PIPERACILLIN SODIUM 3.38 G: 375; 3 INJECTION, SOLUTION INTRAVENOUS at 08:31

## 2020-06-22 RX ADMIN — SULFAMETHOXAZOLE AND TRIMETHOPRIM 160 MG: 800; 160 TABLET ORAL at 21:17

## 2020-06-22 RX ADMIN — NYSTATIN: 100000 POWDER TOPICAL at 21:17

## 2020-06-22 RX ADMIN — FAMOTIDINE 20 MG: 20 TABLET, FILM COATED ORAL at 21:17

## 2020-06-22 RX ADMIN — NYSTATIN: 100000 POWDER TOPICAL at 08:33

## 2020-06-23 VITALS
SYSTOLIC BLOOD PRESSURE: 98 MMHG | TEMPERATURE: 100.5 F | WEIGHT: 187.39 LBS | BODY MASS INDEX: 31.99 KG/M2 | OXYGEN SATURATION: 93 % | HEIGHT: 64 IN | DIASTOLIC BLOOD PRESSURE: 59 MMHG | HEART RATE: 86 BPM | RESPIRATION RATE: 16 BRPM

## 2020-06-23 LAB
ANION GAP SERPL CALCULATED.3IONS-SCNC: 7.8 MMOL/L (ref 5–15)
BASOPHILS # BLD AUTO: 0.01 10*3/MM3 (ref 0–0.2)
BASOPHILS NFR BLD AUTO: 0.1 % (ref 0–1.5)
BUN BLD-MCNC: 10 MG/DL (ref 8–23)
BUN/CREAT SERPL: 13.9 (ref 7–25)
CALCIUM SPEC-SCNC: 8 MG/DL (ref 8.6–10.5)
CHLORIDE SERPL-SCNC: 104 MMOL/L (ref 98–107)
CO2 SERPL-SCNC: 22.2 MMOL/L (ref 22–29)
CREAT BLD-MCNC: 0.72 MG/DL (ref 0.57–1)
DEPRECATED RDW RBC AUTO: 45.5 FL (ref 37–54)
EOSINOPHIL # BLD AUTO: 0.69 10*3/MM3 (ref 0–0.4)
EOSINOPHIL NFR BLD AUTO: 6.4 % (ref 0.3–6.2)
ERYTHROCYTE [DISTWIDTH] IN BLOOD BY AUTOMATED COUNT: 14.1 % (ref 12.3–15.4)
GFR SERPL CREATININE-BSD FRML MDRD: 81 ML/MIN/1.73
GLUCOSE BLD-MCNC: 112 MG/DL (ref 65–99)
HCT VFR BLD AUTO: 41.1 % (ref 34–46.6)
HGB BLD-MCNC: 13 G/DL (ref 12–15.9)
IMM GRANULOCYTES # BLD AUTO: 0.03 10*3/MM3 (ref 0–0.05)
IMM GRANULOCYTES NFR BLD AUTO: 0.3 % (ref 0–0.5)
LYMPHOCYTES # BLD AUTO: 0.55 10*3/MM3 (ref 0.7–3.1)
LYMPHOCYTES NFR BLD AUTO: 5.1 % (ref 19.6–45.3)
MCH RBC QN AUTO: 27.8 PG (ref 26.6–33)
MCHC RBC AUTO-ENTMCNC: 31.6 G/DL (ref 31.5–35.7)
MCV RBC AUTO: 87.8 FL (ref 79–97)
MONOCYTES # BLD AUTO: 0.39 10*3/MM3 (ref 0.1–0.9)
MONOCYTES NFR BLD AUTO: 3.6 % (ref 5–12)
NEUTROPHILS # BLD AUTO: 9.06 10*3/MM3 (ref 1.7–7)
NEUTROPHILS NFR BLD AUTO: 84.5 % (ref 42.7–76)
NRBC BLD AUTO-RTO: 0 /100 WBC (ref 0–0.2)
PLATELET # BLD AUTO: 211 10*3/MM3 (ref 140–450)
PMV BLD AUTO: 10.4 FL (ref 6–12)
POTASSIUM BLD-SCNC: 4 MMOL/L (ref 3.5–5.2)
RBC # BLD AUTO: 4.68 10*6/MM3 (ref 3.77–5.28)
SODIUM BLD-SCNC: 134 MMOL/L (ref 136–145)
WBC NRBC COR # BLD: 10.73 10*3/MM3 (ref 3.4–10.8)

## 2020-06-23 PROCEDURE — 80048 BASIC METABOLIC PNL TOTAL CA: CPT | Performed by: HOSPITALIST

## 2020-06-23 PROCEDURE — 97530 THERAPEUTIC ACTIVITIES: CPT

## 2020-06-23 PROCEDURE — 85025 COMPLETE CBC W/AUTO DIFF WBC: CPT | Performed by: HOSPITALIST

## 2020-06-23 RX ORDER — NYSTATIN 100000 [USP'U]/G
POWDER TOPICAL EVERY 12 HOURS SCHEDULED
Qty: 1 EACH | Refills: 0 | Status: SHIPPED | OUTPATIENT
Start: 2020-06-23 | End: 2020-07-23

## 2020-06-23 RX ORDER — ASPIRIN 81 MG/1
81 TABLET, CHEWABLE ORAL DAILY
Qty: 30 TABLET | Refills: 0 | Status: SHIPPED | OUTPATIENT
Start: 2020-06-24 | End: 2020-07-24

## 2020-06-23 RX ORDER — AMOXICILLIN AND CLAVULANATE POTASSIUM 875; 125 MG/1; MG/1
1 TABLET, FILM COATED ORAL EVERY 12 HOURS SCHEDULED
Qty: 11 TABLET | Refills: 0 | Status: SHIPPED | OUTPATIENT
Start: 2020-06-23 | End: 2020-06-29

## 2020-06-23 RX ORDER — SULFAMETHOXAZOLE AND TRIMETHOPRIM 800; 160 MG/1; MG/1
1 TABLET ORAL EVERY 12 HOURS SCHEDULED
Qty: 11 TABLET | Refills: 0 | Status: SHIPPED | OUTPATIENT
Start: 2020-06-23 | End: 2020-06-29

## 2020-06-23 RX ORDER — CETIRIZINE HYDROCHLORIDE 10 MG/1
10 TABLET ORAL DAILY
Qty: 30 TABLET | Refills: 0 | Status: SHIPPED | OUTPATIENT
Start: 2020-06-24 | End: 2020-07-24

## 2020-06-23 RX ADMIN — FAMOTIDINE 20 MG: 20 TABLET, FILM COATED ORAL at 10:54

## 2020-06-23 RX ADMIN — CETIRIZINE HYDROCHLORIDE 10 MG: 10 TABLET, FILM COATED ORAL at 09:38

## 2020-06-23 RX ADMIN — SULFAMETHOXAZOLE AND TRIMETHOPRIM 160 MG: 800; 160 TABLET ORAL at 09:38

## 2020-06-23 RX ADMIN — NYSTATIN: 100000 POWDER TOPICAL at 09:39

## 2020-06-23 RX ADMIN — ASPIRIN 81 MG: 81 TABLET, CHEWABLE ORAL at 09:38

## 2020-06-23 RX ADMIN — AMOXICILLIN AND CLAVULANATE POTASSIUM 1 TABLET: 875; 125 TABLET, FILM COATED ORAL at 09:38

## 2020-06-24 ENCOUNTER — READMISSION MANAGEMENT (OUTPATIENT)
Dept: CALL CENTER | Facility: HOSPITAL | Age: 65
End: 2020-06-24

## 2020-06-24 NOTE — OUTREACH NOTE
Prep Survey      Responses   [facility] facility patient discharged from?  [place]   Is LACE score < 7 ?  No   Eligibility  Readm Mgmt   Discharge diagnosis  bilateral lower extremity cellulitis   COVID-19 Test Status  Not tested   Does the patient have one of the following disease processes/diagnoses(primary or secondary)?  Other   Does the patient have Home health ordered?  Yes   What is the Home health agency?   [facility]   Is there a DME ordered?  No   Comments regarding appointments  Follow up with No Known   Medication alerts for this patient  multiple changes   Prep survey completed?  Yes          Whitley Finch RN

## 2020-06-25 ENCOUNTER — READMISSION MANAGEMENT (OUTPATIENT)
Dept: CALL CENTER | Facility: HOSPITAL | Age: 65
End: 2020-06-25

## 2020-06-25 NOTE — OUTREACH NOTE
Medical Week 1 Survey      Responses   [facility] patient discharged from?  [place]   COVID-19 Test Status  Not tested   Does the patient have one of the following disease processes/diagnoses(primary or secondary)?  Other   Is there a successful TCM telephone encounter documented?  No   Week 1 attempt successful?  No   Unsuccessful attempts  Attempt 1          Sylvia Ruiz, RN

## 2020-06-29 ENCOUNTER — READMISSION MANAGEMENT (OUTPATIENT)
Dept: CALL CENTER | Facility: HOSPITAL | Age: 65
End: 2020-06-29

## 2020-06-29 NOTE — OUTREACH NOTE
Medical Week 1 Survey      Responses   [facility] [place]   COVID-19 Test Status  Not tested   Does the patient have one of the following disease processes/diagnoses(primary or secondary)?  Other   Is there a successful TCM telephone encounter documented?  No   Week 1 attempt successful?  Yes   Call start time  1012   Call end time  1018   Is patient permission given to speak with other caregiver?  No   Medication alerts for this patient  Pt continues to take antibiotics.   Meds reviewed with patient/caregiver?  Yes   Does the patient have all medications ordered at discharge?  Yes   Is the patient taking all medications as directed (includes completed medication regime)?  Yes   Comments regarding appointments  Pt states that her pcp comes to her home.   Does the patient have a primary care provider?   Yes   Has the patient kept scheduled appointments due by today?  N/A   What is the Home health agency?   [facility]   Has home health visited the patient within 72 hours of discharge?  Yes   Psychosocial issues?  No   Psychosocial comments  Pt does live alone but has caregivers that come 3 x weekly.   Did the patient receive a copy of their discharge instructions?  Yes   Nursing interventions  Reviewed instructions with patient   What is the patient's perception of their health status since discharge?  Improving   Is the patient/caregiver able to teach back signs and symptoms related to disease process for when to call PCP?  Yes   Is the patient/caregiver able to teach back signs and symptoms related to disease process for when to call 911?  Yes   Is the patient/caregiver able to teach back the hierarchy of who to call/visit for symptoms/problems? PCP, Specialist, Home health nurse, Urgent Care, ED, 911  Yes   Additional teach back comments  Pt is in a wheel chair and does live alone. She states she is able to care for herself. She also reports that her legs are looking better and continues  to take her antibiotics.   Week 1 call completed?  Yes          Aminta Simms RN

## 2021-12-20 NOTE — PROGRESS NOTES
Acute Care - Physical Therapy Initial Evaluation  [facility]     Patient Name: Luca Amezcua  : 1955  MRN: [id number]  Today's Date: 3/13/2017   Onset of Illness/Injury or Date of Surgery Date: 17  Date of Referral to PT: 17  Referring Physician: Billie Salmeron      Admit Date: 3/9/2017     Visit Dx:    ICD-10-CM ICD-9-CM   1. Acute renal failure, unspecified acute renal failure type N17.9 584.9   2. Fall, initial encounter W19.XXXA E888.9   3. Sepsis, due to unspecified organism A41.9 038.9     995.91   4. Metabolic acidosis E87.2 276.2   5. Hypothermia, initial encounter T68.XXXA 991.6   6. Necrotizing fasciitis M72.6 728.86   7. Traumatic rhabdomyolysis, initial encounter T79.6XXA 958.6     Patient Active Problem List   Diagnosis   • Sepsis   • Renal failure, acute     Past Medical History   Diagnosis Date   • Bleeding gastrointestinal    • Head trauma 1985     History reviewed. No pertinent past surgical history.       PT ASSESSMENT (last 72 hours)      PT Evaluation       17 1149 17 0900    Rehab Evaluation    Document Type evaluation  -MS evaluation  -CP    Subjective Information agree to therapy;complains of;weakness;fatigue;pain  -MS     Patient Effort, Rehab Treatment adequate  -MS     Symptoms Noted Comment Pt. reports pain in her Bilateral groin area as well as overall fatigue this AM. Pt. eager to work with P.T. this AM.  -MS     General Information    Onset of Illness/Injury or Date of Surgery Date 17  -MS     Referring Physician Billie Salmeron  -MS     Pertinent History Of Current Problem Pt. admitted after a fall in her shower at home. Pt. remained down in shower for 2-3 days pre admission to hospital and developed soft tissue wounds requiring surgery. Pt. s/p Radical Debridement of Bilateral Necrotic Groin wounds (3/10/17)  -MS     Precautions/Limitations fall precautions   Dec. skin integrity; 4 liters oxygen; Pt. Hard of hearing!  -MS     Prior  Level of Function independent:  -MS     Equipment Currently Used at Home none  -MS     Plans/Goals Discussed With patient;agreed upon  -MS     Risks Reviewed patient:  -MS     Benefits Reviewed patient:  -MS     Barriers to Rehab none identified  -MS     Living Environment    Lives With alone  -MS     Living Arrangements apartment  -MS     Clinical Impression    Date of Referral to PT 03/13/17  -MS     Criteria for Skilled Therapeutic Interventions Met treatment indicated  -MS     Rehab Potential good, to achieve stated therapy goals  -MS     Pain Assessment    Pain Assessment 0-10  -MS     Pain Score 5  -MS     Post Pain Score 5  -MS     Pain Type Acute pain;Surgical pain  -MS     Pain Location Groin  -MS     Pain Orientation Right;Left  -MS     Pain Intervention(s) Repositioned;Elevated;Rest  -MS     Cognitive Assessment/Intervention    Current Cognitive/Communication Assessment  impaired   difficult to fully determine d/t significant [place]  -CP    Orientation Status oriented to;person;place  -MS     Follows Commands/Answers Questions 100% of the time;able to follow single-step instructions;needs cueing;needs increased time;needs repetition  -MS     Personal Safety Interventions fall prevention program maintained;gait belt;nonskid shoes/slippers when out of bed;supervised activity  -MS     ROM (Range of Motion)    General ROM --   B Shld (Imp. 25%); Otherwise (WFL's) x 4 extremities  -MS     MMT (Manual Muscle Testing)    General MMT Assessment --   B Shld (3-/5);  Otherwise (>/= 3/5) x 4 extremities  -MS     Bed Mobility, Assessment/Treatment    Bed Mobility, Assistive Device bed rails  -MS     Bed Mobility, Roll Left, [employer] moderate assist (50% patient effort)  -MS     Bed Mobility, Roll Right, [employer] moderate assist (50% patient effort)  -MS     Bed Mob, Supine to Sit, [employer] moderate assist (50% patient effort);2 person assist required  -MS     Bed Mob, Sit to Supine, [employer] moderate  assist (50% patient effort);2 person assist required  -MS     Transfer Assessment/Treatment    Transfers, Sit-Stand [place] moderate assist (50% patient effort);2 person assist required;hand held assist  -MS     Transfers, Stand-Sit [place] moderate assist (50% patient effort);2 person assist required;hand held assist  -MS     Transfer, Comment Performed x 3 at bedside for functional strength training.  Pt. requires bilateral feet/knees blocked for safety due to overall weakness/fatigue.   Pt. requires mod. verbal/tactile cues for posture correction and was only able to maintain a standing position for ~ 30 seconds at a time before her bilateral knees began to give out due to fatigue/weakness.  -MS     Gait Assessment/Treatment    Gait, Comment Attempted to sidestep but pt. unable to advance her L.E.'s due to pain, weakness, fatigue.  -MS     Balance Skills Training    Sitting-Level of Assistance Minimum assistance  -MS     Sitting-Balance Support Feet supported;Left upper extremity supported;Right upper extremity supported  -MS     Standing-Level of Assistance Moderate assistance;x2  -MS     Static Standing Balance Support Right upper extremity supported;Left upper extremity supported  -MS     Standing-Balance Activities Weight Shift A-P;Weight Shift R-L  -MS     Therapy Exercises    Bilateral Lower Extremities AROM:;5 reps;sitting;ankle pumps/circles;LAQ  -MS     Bilateral Upper Extremity AROM:;5 reps;sitting;elbow flexion/extension;shoulder extension/flexion  -MS     Positioning and Restraints    Pre-Treatment Position in bed  -MS     Post Treatment Position bed  -MS     In Bed notified nsg;supine;call light within reach;encouraged to call for assist;with nsg;RUE elevated;LUE elevated;RLE elevated;LLE elevated;R heel elevated;L heel elevated;SCD pump applied   All lines intact. Nurse notified how pt. did with PT.  -MS       03/12/17 1126 03/11/17 0856    Rehab Evaluation    Evaluation Not Performed  other (see comments)   Pt on vent. Pt will check back tomorrow.  - unable to evaluate, medical status change;other (see comments)   Pt remains intubated following surgery. Will hold on evaluation until pt medically appropriate.   -AK      User Key  (r) = Recorded By, (t) = Taken By, (c) = Cosigned By    Initials Name Provider Type    JENNIFER Solis, MS CCC-SLP Speech and Language Pathologist    CP Zamzam Miller, MS CCC-SLP Speech and Language Pathologist    WANDER Hagan, PT Physical Therapist    MS Abdiel Yee, PT Physical Therapist          Physical Therapy Education     Title: PT OT SLP Therapies (Active)     Topic: Physical Therapy (Active)     Point: Mobility training (Active)    Learning Progress Summary    Learner Readiness Method Response Comment Documented by Status   Patient Acceptance E,D NR Pt. is Hard of hearing; Must speak louder and clearly for pt. to hear you. MS 03/13/17 1157 Active               Point: Home exercise program (Active)    Learning Progress Summary    Learner Readiness Method Response Comment Documented by Status   Patient Acceptance E,D NR Pt. is Hard of hearing; Must speak louder and clearly for pt. to hear you. MS 03/13/17 1157 Active               Point: Body mechanics (Active)    Learning Progress Summary    Learner Readiness Method Response Comment Documented by Status   Patient Acceptance E,D NR Pt. is Hard of hearing; Must speak louder and clearly for pt. to hear you. MS 03/13/17 1157 Active               Point: Precautions (Active)    Learning Progress Summary    Learner Readiness Method Response Comment Documented by Status   Patient Acceptance E,D NR Pt. is Hard of hearing; Must speak louder and clearly for pt. to hear you. MS 03/13/17 1157 Active                      User Key     Initials Effective Dates Name Provider Type Discipline    MS 12/01/15 -  Abdiel Yee, PT Physical Therapist PT                PT Recommendation and Plan  Anticipated  Discharge Disposition: skilled nursing facility  Planned Therapy Interventions: balance training, bed mobility training, gait training, patient/family education, postural re-education, ROM (Range of Motion), strengthening, transfer training  PT Frequency: daily  Plan of Care Review  Plan Of Care Reviewed With: patient  Outcome Summary/Follow up Plan: Pt. presents with overall decrease in strength, AROM, tolerance to functional activity that prohibits pt. in everyday functional tasks. Pt. will benefit from skilled inpt. P.T. to address her functional deficits and to assist pt. in regaining her maximum level of independence with functional mobility.          IP PT Goals       03/13/17 1158          Bed Mobility PT LTG    Bed Mobility PT LTG, Date Established 03/13/17  -MS      Bed Mobility PT LTG, Time to Achieve 5 - 7 days  -MS      Bed Mobility PT LTG, Activity Type all bed mobility  -MS      Bed Mobility PT LTG, [place] Level minimum assist (75% patient effort)  -MS      Transfer Training PT LTG    Transfer Training PT LTG, Date Established 03/13/17  -MS      Transfer Training PT LTG, Time to Achieve 5 - 7 days  -MS      Transfer Training PT LTG, Activity Type bed to chair /chair to bed;sit to stand/stand to sit  -MS      Transfer Training PT LTG, [place] Level minimum assist (75% patient effort)  -MS      Transfer Training PT LTG, Assist Device --   With A.A.D.  -MS      Gait Training PT LTG    Gait Training Goal PT LTG, Date Established 03/13/17  -MS      Gait Training Goal PT LTG, Time to Achieve 5 - 7 days  -MS      Gait Training Goal PT LTG, [place] Level minimum assist (75% patient effort)  -MS      Gait Training Goal PT LTG, Assist Device --   With A.A.D.  -MS      Gait Training Goal PT LTG, Distance to Achieve 100 feet  -MS      Dynamic Sitting Balance PT LTG    Dynamic Sitting Balance PT LTG, Date Established 03/13/17  -MS      Dynamic Sitting Balance PT LTG, Time to Achieve 5 - 7 days  -MS       Dynamic Sitting Balance PT LTG, [place] Level independent  -MS      Dynamic Sitting Balance PT LTG, Assist Device UE Support  -MS        User Key  (r) = Recorded By, (t) = Taken By, (c) = Cosigned By    Initials Name Provider Type    MS Abdiel LOCKWOOD Joselito PT Physical Therapist                Outcome Measures       03/13/17 1200          How much help from another person do you currently need...    Turning from your back to your side while in flat bed without using bedrails? 2  -MS      Moving from lying on back to sitting on the side of a flat bed without bedrails? 2  -MS      Moving to and from a bed to a chair (including a wheelchair)? 1  -MS      Standing up from a chair using your arms (e.g., wheelchair, bedside chair)? 2  -MS      Climbing 3-5 steps with a railing? 1  -MS      To walk in hospital room? 1  -MS      AM-PAC 6 Clicks Score 9  -MS      Functional Assessment    Outcome Measure Options AM-PAC 6 Clicks Basic Mobility (PT)  -MS        User Key  (r) = Recorded By, (t) = Taken By, (c) = Cosigned By    Initials Name Provider Type    MS Abdiel LOCKWOOD JACKIE Yee Physical Therapist           Time Calculation:         PT Charges       03/13/17 1203          Time Calculation    Start Time 1119  -MS      Stop Time 1144  -MS      Time Calculation (min) 25 min  -MS      PT Received On 03/13/17  -MS      PT - Next Appointment 03/14/17  -MS      PT Goal Re-Cert Due Date 03/20/17  -MS        User Key  (r) = Recorded By, (t) = Taken By, (c) = Cosigned By    Initials Name Provider Type    MS Abdiel LOCKWOOD JACKIE Yee Physical Therapist          Therapy Charges for Today     Code Description Service Date Service Provider Modifiers Qty    45544249231 HC PT EVAL MOD COMPLEXITY 2 3/13/2017 Abdiel Yee, PT GP 1    58424968684 HC PT THER PROC EA 15 MIN 3/13/2017 Abdiel Yee, PT GP 2    31909790737 HC PT THER SUPP EA 15 MIN 3/13/2017 Abdiel Yee, PT GP 2          PT G-Codes  Outcome Measure Options: AM-PAC 6  Clicks Basic Mobility (PT)      Abdiel Yee, PT  3/13/2017        PAST MEDICAL HISTORY:  HLD (hyperlipidemia)     PFO (patent foramen ovale)     Transient ischemic attack (TIA)

## 2023-12-12 ENCOUNTER — APPOINTMENT (OUTPATIENT)
Dept: GENERAL RADIOLOGY | Facility: HOSPITAL | Age: 68
End: 2023-12-12
Payer: MEDICARE

## 2023-12-12 ENCOUNTER — HOSPITAL ENCOUNTER (EMERGENCY)
Facility: HOSPITAL | Age: 68
Discharge: ANOTHER HOSPITAL | End: 2023-12-12
Attending: EMERGENCY MEDICINE | Admitting: EMERGENCY MEDICINE
Payer: MEDICARE

## 2023-12-12 VITALS
WEIGHT: 160 LBS | SYSTOLIC BLOOD PRESSURE: 109 MMHG | TEMPERATURE: 97.4 F | HEART RATE: 83 BPM | BODY MASS INDEX: 27.31 KG/M2 | HEIGHT: 64 IN | DIASTOLIC BLOOD PRESSURE: 52 MMHG | OXYGEN SATURATION: 100 % | RESPIRATION RATE: 16 BRPM

## 2023-12-12 DIAGNOSIS — R53.1 WEAKNESS: ICD-10-CM

## 2023-12-12 DIAGNOSIS — S30.0XXA COCCYX CONTUSION, INITIAL ENCOUNTER: ICD-10-CM

## 2023-12-12 DIAGNOSIS — T79.6XXA TRAUMATIC RHABDOMYOLYSIS, INITIAL ENCOUNTER: Primary | ICD-10-CM

## 2023-12-12 DIAGNOSIS — L89.159 PRESSURE INJURY OF SKIN OF SACRAL REGION, UNSPECIFIED INJURY STAGE: ICD-10-CM

## 2023-12-12 LAB
ALBUMIN SERPL-MCNC: 3.5 G/DL (ref 3.5–5.2)
ALBUMIN/GLOB SERPL: 1 G/DL
ALP SERPL-CCNC: 127 U/L (ref 39–117)
ALT SERPL W P-5'-P-CCNC: 38 U/L (ref 1–33)
ANION GAP SERPL CALCULATED.3IONS-SCNC: 13 MMOL/L (ref 5–15)
AST SERPL-CCNC: 67 U/L (ref 1–32)
BASOPHILS # BLD AUTO: 0.06 10*3/MM3 (ref 0–0.2)
BASOPHILS NFR BLD AUTO: 0.4 % (ref 0–1.5)
BILIRUB SERPL-MCNC: 0.4 MG/DL (ref 0–1.2)
BUN SERPL-MCNC: 19 MG/DL (ref 8–23)
BUN/CREAT SERPL: 25.7 (ref 7–25)
CALCIUM SPEC-SCNC: 9.4 MG/DL (ref 8.6–10.5)
CHLORIDE SERPL-SCNC: 106 MMOL/L (ref 98–107)
CK SERPL-CCNC: 1560 U/L (ref 20–180)
CO2 SERPL-SCNC: 21 MMOL/L (ref 22–29)
CREAT SERPL-MCNC: 0.74 MG/DL (ref 0.57–1)
DEPRECATED RDW RBC AUTO: 43.9 FL (ref 37–54)
EGFRCR SERPLBLD CKD-EPI 2021: 87.7 ML/MIN/1.73
EOSINOPHIL # BLD AUTO: 0.06 10*3/MM3 (ref 0–0.4)
EOSINOPHIL NFR BLD AUTO: 0.4 % (ref 0.3–6.2)
ERYTHROCYTE [DISTWIDTH] IN BLOOD BY AUTOMATED COUNT: 13.8 % (ref 12.3–15.4)
GLOBULIN UR ELPH-MCNC: 3.4 GM/DL
GLUCOSE SERPL-MCNC: 124 MG/DL (ref 65–99)
HCT VFR BLD AUTO: 36.7 % (ref 34–46.6)
HGB BLD-MCNC: 11.8 G/DL (ref 12–15.9)
IMM GRANULOCYTES # BLD AUTO: 0.07 10*3/MM3 (ref 0–0.05)
IMM GRANULOCYTES NFR BLD AUTO: 0.5 % (ref 0–0.5)
LYMPHOCYTES # BLD AUTO: 1.31 10*3/MM3 (ref 0.7–3.1)
LYMPHOCYTES NFR BLD AUTO: 9.4 % (ref 19.6–45.3)
MCH RBC QN AUTO: 28 PG (ref 26.6–33)
MCHC RBC AUTO-ENTMCNC: 32.2 G/DL (ref 31.5–35.7)
MCV RBC AUTO: 87.2 FL (ref 79–97)
MONOCYTES # BLD AUTO: 0.8 10*3/MM3 (ref 0.1–0.9)
MONOCYTES NFR BLD AUTO: 5.8 % (ref 5–12)
NEUTROPHILS NFR BLD AUTO: 11.6 10*3/MM3 (ref 1.7–7)
NEUTROPHILS NFR BLD AUTO: 83.5 % (ref 42.7–76)
NRBC BLD AUTO-RTO: 0 /100 WBC (ref 0–0.2)
PLATELET # BLD AUTO: 259 10*3/MM3 (ref 140–450)
PMV BLD AUTO: 10.6 FL (ref 6–12)
POTASSIUM SERPL-SCNC: 4.3 MMOL/L (ref 3.5–5.2)
PROT SERPL-MCNC: 6.9 G/DL (ref 6–8.5)
RBC # BLD AUTO: 4.21 10*6/MM3 (ref 3.77–5.28)
SODIUM SERPL-SCNC: 140 MMOL/L (ref 136–145)
WBC NRBC COR # BLD AUTO: 13.9 10*3/MM3 (ref 3.4–10.8)

## 2023-12-12 PROCEDURE — 85025 COMPLETE CBC W/AUTO DIFF WBC: CPT | Performed by: EMERGENCY MEDICINE

## 2023-12-12 PROCEDURE — 96361 HYDRATE IV INFUSION ADD-ON: CPT

## 2023-12-12 PROCEDURE — 80053 COMPREHEN METABOLIC PANEL: CPT | Performed by: EMERGENCY MEDICINE

## 2023-12-12 PROCEDURE — 99284 EMERGENCY DEPT VISIT MOD MDM: CPT

## 2023-12-12 PROCEDURE — 82550 ASSAY OF CK (CPK): CPT | Performed by: EMERGENCY MEDICINE

## 2023-12-12 PROCEDURE — 96360 HYDRATION IV INFUSION INIT: CPT

## 2023-12-12 PROCEDURE — 72220 X-RAY EXAM SACRUM TAILBONE: CPT

## 2023-12-12 PROCEDURE — 25810000003 SODIUM CHLORIDE 0.9 % SOLUTION: Performed by: EMERGENCY MEDICINE

## 2023-12-12 RX ORDER — SODIUM CHLORIDE 9 MG/ML
125 INJECTION, SOLUTION INTRAVENOUS CONTINUOUS
Status: DISCONTINUED | OUTPATIENT
Start: 2023-12-12 | End: 2023-12-12 | Stop reason: HOSPADM

## 2023-12-12 RX ORDER — ESCITALOPRAM OXALATE 10 MG/1
10 TABLET ORAL DAILY
Status: DISCONTINUED | OUTPATIENT
Start: 2023-12-12 | End: 2023-12-12 | Stop reason: HOSPADM

## 2023-12-12 RX ORDER — SODIUM CHLORIDE 0.9 % (FLUSH) 0.9 %
10 SYRINGE (ML) INJECTION AS NEEDED
Status: DISCONTINUED | OUTPATIENT
Start: 2023-12-12 | End: 2023-12-12 | Stop reason: HOSPADM

## 2023-12-12 RX ADMIN — SODIUM CHLORIDE 125 ML/HR: 9 INJECTION, SOLUTION INTRAVENOUS at 05:32

## 2023-12-12 RX ADMIN — SODIUM CHLORIDE 500 ML: 9 INJECTION, SOLUTION INTRAVENOUS at 05:34

## 2023-12-12 RX ADMIN — ESCITALOPRAM OXALATE 10 MG: 10 TABLET, FILM COATED ORAL at 10:14

## 2023-12-12 NOTE — ED TRIAGE NOTES
"To ER via EMS from home.  Pt states she was on the floor for over 2 days.   Pt states she fell of wheelchair Sunday \"when it was dark\"?  No injuries from fall per pt.    Brother was unable to get ahold of pt and contacted [employer] who had camera in house and saw pt on floor.      EMS had to break door to get in.   "

## 2023-12-12 NOTE — CASE MANAGEMENT/SOCIAL WORK
Discharge Planning Assessment  [facility]     Patient Name: Luca Amezcua  MRN: [id number]  Today's Date: 12/12/2023    Admit Date: 12/12/2023        Discharge Needs Assessment    No documentation.                  Discharge Plan       Row Name 12/12/23 2718       Plan    Plan Comments Call placed to [facility] for update- still no bed available at this time.      Row Name 12/12/23 0492       Plan    Plan Comments --                  Continued Care and Services - Admitted Since 12/12/2023    Coordination has not been started for this encounter.          Demographic Summary    No documentation.                  Functional Status    No documentation.                  Psychosocial    No documentation.                  Abuse/Neglect    No documentation.                  Legal    No documentation.                  Substance Abuse    No documentation.                  Patient Forms    No documentation.                     Vikki Jean RN

## 2023-12-12 NOTE — ED PROVIDER NOTES
EMERGENCY DEPARTMENT ENCOUNTER    Room Number:  10/10  Date seen:  12/12/2023  PCP: Provider, No Known  Historian(s): Patient, paramedics      HPI:  Chief Complaint: Found down, buttocks pain  A complete HPI/ROS/PMH/PSH/SH/FH are unobtainable / limited due to: Patient very hard of hearing  Context: Luca Amezcua is a 68 y.o. female who presents to the ED via EMS from home after she accidentally fell out of her wheelchair and was found down on the ground.  Patient is essentially wheelchair-bound and lives at home by herself.  She believes that she fell out of her wheelchair sometime Sunday night or early Monday morning and has been on the ground all day today.  Her brother called her today and could not get a hold of her so he contacted their alarm company who had access to a care in the house and saw the patient on the floor.  Paramedics were able to enter the residence and found the patient on the ground.  Patient says that she fell asleep in her chair while holding a beverage.  She woke up suddenly and was startled and her cup fell to the ground and this caused her to suddenly jerk and fall forward out of her chair.  She says she landed on her buttocks and injured that part at this time.  She denies any other areas of injury or concern.  She denies any head injury.  She denies any loss of consciousness.        PAST MEDICAL HISTORY  Active Ambulatory Problems     Diagnosis Date Noted    Sepsis 03/09/2017    Acute renal failure 03/12/2017    Necrotizing fasciitis 03/21/2017    Metabolic acidosis 03/21/2017    Fall 03/21/2017    Hypothermia 03/21/2017    Traumatic rhabdomyolysis 03/21/2017    Cellulitis of right lower extremity 06/19/2020     Resolved Ambulatory Problems     Diagnosis Date Noted    No Resolved Ambulatory Problems     Past Medical History:   Diagnosis Date    Bleeding gastrointestinal     Head trauma 1985    Thyroid nodule 2007         PAST SURGICAL HISTORY  Past Surgical History:   Procedure  Laterality Date    COLONOSCOPY N/A 03/25/2003    NORMAL COLONOSCOPY TO THE CECUM, DR. USAMA PUTNAM    COLONOSCOPY N/A 10/18/2001    COLONOSCOPY TO CECUM, NORMAL EXAM, DR. BAYLEE TEMPLETON    ENTEROSCOPY SMALL BOWEL N/A 05/24/2004    W/ BIOPSY TO R/O CELIAC DISEASE: NO MICROSCOPIC ABNORMALITY, DR. CALLIE ROOT    INCISION AND DRAINAGE LEG Left 3/10/2017    Procedure: INCISION AND DRAINAGE BILATERAL GROIN;  Surgeon: Andrea Fried MD;  Location: [facility];  Service:     INSERTION HEMODIALYSIS CATHETER N/A 3/16/2017    Procedure: PALINDROME INSERTION;  Surgeon: Kendall Snow Jr., MD;  Location: [facility];  Service:          FAMILY HISTORY  History reviewed. No pertinent family history.      SOCIAL HISTORY  Social History     Socioeconomic History    Marital status: Single   Tobacco Use    Smoking status: Never   Substance and Sexual Activity    Alcohol use: No    Drug use: Defer    Sexual activity: Never         ALLERGIES  Vancomycin        REVIEW OF SYSTEMS  Review of Systems   Constitutional:  Negative for activity change and fever.   HENT: Negative.     Eyes:  Negative for pain and visual disturbance.   Respiratory:  Negative for cough and shortness of breath.    Cardiovascular:  Negative for chest pain.   Gastrointestinal:  Negative for abdominal pain.   Musculoskeletal:  Positive for back pain and gait problem.   Skin:  Negative for color change.   Neurological:  Positive for weakness. Negative for syncope and headaches.   All other systems reviewed and are negative.           PHYSICAL EXAM  ED Triage Vitals [12/12/23 0248]   Temp Heart Rate Resp BP SpO2   97.4 °F (36.3 °C) 101 16 157/88 95 %      Temp src Heart Rate Source Patient Position BP Location FiO2 (%)   -- -- -- -- --       Physical Exam      GENERAL: No diaphoresis no acute distress  HENT: nares patent, normocephalic and atraumatic, mucous membranes somewhat dry  EYES: no scleral icterus, EOMI, normal conjunctivae  CV: regular  rhythm, normal rate, normal distal pulses  RESPIRATORY: normal effort, no stridor, clear to auscultation bilaterally  ABDOMEN: soft, nontender in all quadrants  MUSCULOSKELETAL: no deformity, there are chronic appearing changes of erythema and induration in the right lower extremity from the calf through the foot and ankle.  NEURO: alert, follows commands, speech is somewhat disarticulate but I believe this is a chronic feature  PSYCH:  calm, cooperative  SKIN: warm, dry    Vital signs and nursing notes reviewed.        LAB RESULTS  Recent Results (from the past 24 hour(s))   Comprehensive Metabolic Panel    Collection Time: 12/12/23  4:15 AM    Specimen: Blood   Result Value Ref Range    Glucose 124 (H) 65 - 99 mg/dL    BUN 19 8 - 23 mg/dL    Creatinine 0.74 0.57 - 1.00 mg/dL    Sodium 140 136 - 145 mmol/L    Potassium 4.3 3.5 - 5.2 mmol/L    Chloride 106 98 - 107 mmol/L    CO2 21.0 (L) 22.0 - 29.0 mmol/L    Calcium 9.4 8.6 - 10.5 mg/dL    Total Protein 6.9 6.0 - 8.5 g/dL    Albumin 3.5 3.5 - 5.2 g/dL    ALT (SGPT) 38 (H) 1 - 33 U/L    AST (SGOT) 67 (H) 1 - 32 U/L    Alkaline Phosphatase 127 (H) 39 - 117 U/L    Total Bilirubin 0.4 0.0 - 1.2 mg/dL    Globulin 3.4 gm/dL    A/G Ratio 1.0 g/dL    BUN/Creatinine Ratio 25.7 (H) 7.0 - 25.0    Anion Gap 13.0 5.0 - 15.0 mmol/L    eGFR 87.7 >60.0 mL/min/1.73   CK    Collection Time: 12/12/23  4:15 AM    Specimen: Blood   Result Value Ref Range    Creatine Kinase 1,560 (H) 20 - 180 U/L   CBC Auto Differential    Collection Time: 12/12/23  5:24 AM    Specimen: Blood   Result Value Ref Range    WBC 13.90 (H) 3.40 - 10.80 10*3/mm3    RBC 4.21 3.77 - 5.28 10*6/mm3    Hemoglobin 11.8 (L) 12.0 - 15.9 g/dL    Hematocrit 36.7 34.0 - 46.6 %    MCV 87.2 79.0 - 97.0 fL    MCH 28.0 26.6 - 33.0 pg    MCHC 32.2 31.5 - 35.7 g/dL    RDW 13.8 12.3 - 15.4 %    RDW-SD 43.9 37.0 - 54.0 fl    MPV 10.6 6.0 - 12.0 fL    Platelets 259 140 - 450 10*3/mm3    Neutrophil % 83.5 (H) 42.7 - 76.0 %     Lymphocyte % 9.4 (L) 19.6 - 45.3 %    Monocyte % 5.8 5.0 - 12.0 %    Eosinophil % 0.4 0.3 - 6.2 %    Basophil % 0.4 0.0 - 1.5 %    Immature Grans % 0.5 0.0 - 0.5 %    Neutrophils, Absolute 11.60 (H) 1.70 - 7.00 10*3/mm3    Lymphocytes, Absolute 1.31 0.70 - 3.10 10*3/mm3    Monocytes, Absolute 0.80 0.10 - 0.90 10*3/mm3    Eosinophils, Absolute 0.06 0.00 - 0.40 10*3/mm3    Basophils, Absolute 0.06 0.00 - 0.20 10*3/mm3    Immature Grans, Absolute 0.07 (H) 0.00 - 0.05 10*3/mm3    nRBC 0.0 0.0 - 0.2 /100 WBC       Ordered the above labs and reviewed the results.        RADIOLOGY  XR Sacrum & Coccyx    Result Date: 12/12/2023  X-RAY OF THE SACRUM AND COCCYX  HISTORY: Fall with buttocks pain  COMPARISON: None available.  FINDINGS: The AP view in particular is technically limited. No obvious acute fracture or subluxation of the sacrum or coccyx is seen. There are symmetric degenerative changes involving the SI joints. Patient has discogenic degenerative disease of the visualized lumbosacral spine.      No obvious acute fracture or subluxation identified.  This report was finalized on 12/12/2023 5:20 AM by Dr. Tereza Kim M.D on Workstation: [id number]       Ordered the above noted radiological studies. Reviewed by me in PACS.          PROCEDURES  Procedures        MEDICATIONS GIVEN IN ER  Medications   sodium chloride 0.9 % flush 10 mL (has no administration in time range)   sodium chloride 0.9 % infusion (125 mL/hr Intravenous New Bag 12/12/23 0532)   sodium chloride 0.9 % bolus 500 mL (500 mL Intravenous New Bag 12/12/23 0534)           MEDICAL DECISION MAKING, PROGRESS, and CONSULTS    All labs have been independently reviewed by me.  All radiology studies have been reviewed by me and I have also reviewed the radiology report.   EKG's independently viewed and interpreted by me.  Discussion below represents my analysis of pertinent findings related to patient's condition, differential diagnosis, treatment plan and  final disposition.      Additional sources:  - Discussed/ obtained information from independent historians:  I discussed with paramedics to receive report of patient's condition on arrival and treatments initiated en route to the hospital.    - External (non-ED) record review: I reviewed the most recent hospital discharge summary from June 23, 2020 when she was admitted here for bilateral lower extremity cellulitis    - Chronic or social conditions impacting care: Older patient with multiple medical problems who is wheelchair-bound and lives in her own residence by herself.  At risk for falls and injuries with some limited immediate support        Orders placed during this visit:  Orders Placed This Encounter   Procedures    XR Sacrum & Coccyx    Comprehensive Metabolic Panel    CK    CBC Auto Differential    Urinalysis With Microscopic If Indicated (No Culture) - Urine, Catheter    Insert Peripheral IV    CBC & Differential         Differential diagnosis includes but is not limited to:    Coccyx fracture, rhabdomyolysis, dehydration, UTI, deconditioning      Independent interpretation of labs, radiology studies, and discussions with consultants:  ED Course as of 12/12/23 0615   Tue Dec 12, 2023   0506 Creatine Kinase(!): 1,560 [NAKIA]   0524 Starting IV fluids for rhabdomyolysis concern [NAKIA]   0528 Unfortunately, patient's health insurance plan is nonparticipatory with our hospital.  I explained this to the patient and offered to transfer her to another hospital system.  She requests to be transferred to [facility].  Therefore we are paging the [place] transfer line now. [NAKIA]   0529 I independently interpreted the x-ray of the sacrum and coccyx and my findings are: No fracture or subluxation [NAKIA]   0614 I just discussed with Eugenio AVENDANO, from the [place] [facility].  He agrees to accept the patient in transfer on behalf of Dr. Astorga. [NAKIA]      ED Course User Index  [NAKIA]  Trace Naik MD         DIAGNOSIS  Final diagnoses:   Traumatic rhabdomyolysis, initial encounter   Weakness   Coccyx contusion, initial encounter   Pressure injury of skin of sacral region, unspecified injury stage         DISPOSITION  Transfer to [facility]        Latest Documented Vital Signs:  As of 06:15 EST  BP- 157/88 HR- 102 Temp- 97.4 °F (36.3 °C) O2 sat- 95%              --    Please note that portions of this were completed with a voice recognition program.       Note Disclaimer: At [facility], we believe that sharing information builds trust and better relationships. You are receiving this note because you are receiving care at [facility] or recently visited. It is possible you will see health information before a provider has talked with you about it. This kind of information can be easy to misunderstand. To help you fully understand what it means for your health, we urge you to discuss this note with your provider.             Trace Naik MD  12/12/23 0615

## 2023-12-12 NOTE — ED NOTES
Bed booked at [facility]. 2E room 265. Unit phone number #[phone]. CCP updated they will arrange transport.

## 2023-12-12 NOTE — CASE MANAGEMENT/SOCIAL WORK
Discharge Planning Assessment  [facility]     Patient Name: Luca Amezcua  MRN: [id number]  Today's Date: 12/12/2023    Admit Date: 12/12/2023        Discharge Needs Assessment    No documentation.                  Discharge Plan       Row Name 12/12/23 0927       Plan    Plan Comments Call placed to [facility]- no bed available at this time- anticipating this afternoon- call placed to [facility] to advise- spoke w/Ann Marie                  Continued Care and Services - Admitted Since 12/12/2023    Coordination has not been started for this encounter.          Demographic Summary    No documentation.                  Functional Status    No documentation.                  Psychosocial    No documentation.                  Abuse/Neglect    No documentation.                  Legal    No documentation.                  Substance Abuse    No documentation.                  Patient Forms    No documentation.                     Vikki Jean RN

## (undated) DEVICE — SPNG GZ WOVN 4X4IN 12PLY 10/BX STRL

## (undated) DEVICE — NDL HYPO PRECISIONGLIDE REG 25G 1 1/2

## (undated) DEVICE — SUT SILK 2/0 TIES 18IN A185H

## (undated) DEVICE — DECANT BG O JET

## (undated) DEVICE — APPL CHLORAPREP W/TINT 10.5ML PERC STRL

## (undated) DEVICE — GOWN,NON-REINFORCED,SIRUS,SET IN SLV,XXL: Brand: MEDLINE

## (undated) DEVICE — ADHS SKIN DERMABOND TOP ADVANCED

## (undated) DEVICE — SYR LUERLOK 20CC

## (undated) DEVICE — GLV SURG TRIUMPH CLASSIC PF LTX 8 STRL

## (undated) DEVICE — SUT ETHLN 2/0 PS 18IN 585H

## (undated) DEVICE — ANTIBACTERIAL UNDYED BRAIDED (POLYGLACTIN 910), SYNTHETIC ABSORBABLE SUTURE: Brand: COATED VICRYL

## (undated) DEVICE — APPL CHLORAPREP W/TINT 26ML ORNG

## (undated) DEVICE — SYR LUERLOK 5CC

## (undated) DEVICE — INTENDED FOR TISSUE SEPARATION, AND OTHER PROCEDURES THAT REQUIRE A SHARP SURGICAL BLADE TO PUNCTURE OR CUT.: Brand: BARD-PARKER ® CARBON RIB-BACK BLADES

## (undated) DEVICE — GLV SURG BIOGEL LTX PF 8 1/2

## (undated) DEVICE — SUT SILK 4/0 TIES 18IN A183H

## (undated) DEVICE — CVR PROB INTRAOP L TP 12X244CM

## (undated) DEVICE — KT CATH TAL PALINDROME SAPPHIRE 14.5F19CM

## (undated) DEVICE — PAD,ABDOMINAL,8"X10",ST,LF: Brand: MEDLINE

## (undated) DEVICE — LOU MINOR PROCEDURE: Brand: MEDLINE INDUSTRIES, INC.

## (undated) DEVICE — Device

## (undated) DEVICE — BIOPATCH™ ANTIMICROBIAL DRESSING WITH CHLORHEXIDINE GLUCONATE IS A HYDROPHILLIC POLYURETHANE ABSORPTIVE FOAM WITH CHLORHEXIDINE GLUCONATE (CHG) WHICH INHIBITS BACTERIAL GROWTH UNDER THE DRESSING. THE DRESSING IS INTENDED TO BE USED TO ABSORB EXUDATE, COVER A WOUND CAUSED BY VASCULAR AND NONVASCULAR PERCUTANEOUS MEDICAL DEVICES DURING SURGERY, AS WELL AS REDUCE LOCAL INFECTION AND COLONIZATION OF MICROORGANISMS.: Brand: BIOPATCH